# Patient Record
Sex: FEMALE | Race: WHITE | NOT HISPANIC OR LATINO | URBAN - METROPOLITAN AREA
[De-identification: names, ages, dates, MRNs, and addresses within clinical notes are randomized per-mention and may not be internally consistent; named-entity substitution may affect disease eponyms.]

---

## 2017-12-02 ENCOUNTER — EMERGENCY (EMERGENCY)
Facility: HOSPITAL | Age: 57
LOS: 1 days | Discharge: HOME | End: 2017-12-02
Attending: EMERGENCY MEDICINE | Admitting: EMERGENCY MEDICINE
Payer: COMMERCIAL

## 2017-12-02 VITALS
TEMPERATURE: 99 F | WEIGHT: 115.08 LBS | HEIGHT: 64 IN | OXYGEN SATURATION: 96 % | RESPIRATION RATE: 18 BRPM | DIASTOLIC BLOOD PRESSURE: 81 MMHG | SYSTOLIC BLOOD PRESSURE: 164 MMHG | HEART RATE: 125 BPM

## 2017-12-02 DIAGNOSIS — Z79.2 LONG TERM (CURRENT) USE OF ANTIBIOTICS: ICD-10-CM

## 2017-12-02 DIAGNOSIS — R50.9 FEVER, UNSPECIFIED: ICD-10-CM

## 2017-12-02 DIAGNOSIS — A69.20 LYME DISEASE, UNSPECIFIED: ICD-10-CM

## 2017-12-02 DIAGNOSIS — Z79.899 OTHER LONG TERM (CURRENT) DRUG THERAPY: ICD-10-CM

## 2017-12-02 PROCEDURE — 99284 EMERGENCY DEPT VISIT MOD MDM: CPT

## 2017-12-02 RX ORDER — INDOMETHACIN 50 MG
25 CAPSULE ORAL ONCE
Qty: 0 | Refills: 0 | Status: COMPLETED | OUTPATIENT
Start: 2017-12-02 | End: 2017-12-02

## 2017-12-02 RX ORDER — INDOMETHACIN 50 MG
1 CAPSULE ORAL
Qty: 21 | Refills: 0
Start: 2017-12-02 | End: 2017-12-09

## 2017-12-02 RX ORDER — CEFTRIAXONE 500 MG/1
1000 INJECTION, POWDER, FOR SOLUTION INTRAMUSCULAR; INTRAVENOUS ONCE
Qty: 0 | Refills: 0 | Status: COMPLETED | OUTPATIENT
Start: 2017-12-02 | End: 2017-12-02

## 2017-12-02 RX ADMIN — Medication 25 MILLIGRAM(S): at 14:39

## 2017-12-02 RX ADMIN — CEFTRIAXONE 1000 MILLIGRAM(S): 500 INJECTION, POWDER, FOR SOLUTION INTRAMUSCULAR; INTRAVENOUS at 14:39

## 2017-12-02 NOTE — ED PROVIDER NOTE - OBJECTIVE STATEMENT
56 y/o F [place] and was bit by a tick to L upper back. She noticed a rash to the area and was seen at [facility] urgent care, had lyme titers sent, the rash was outlined, and discharged with 21 days of Doxycycline 100 mg BID. She was advised to come to the ER if she develops fever or has increase redness. Today patient has noticed increase redness over the border and fever of 101 today, so patient comes in for evaluation. Unable to wear a bra due to discomfort. No red streaking. No itching. No increase warmth over the area. 58 y/o F was in the country in [facility] 1 week ago and was bit by a tick to L upper back. She noticed a rash to the area and was seen at [facility] urgent care, had lyme titers sent, the rash was outlined, and discharged with 21 days of Doxycycline 100 mg BID. She was advised to come to the ER if she develops fever or has increase redness. Today patient has noticed increase redness over the border and fever of 101 today, so patient comes in for evaluation. Unable to wear a bra due to discomfort. No red streaking. No itching. No increase warmth over the area.

## 2017-12-02 NOTE — ED ADULT TRIAGE NOTE - CHIEF COMPLAINT QUOTE
Pt states she was bit by a tick 1 week ago, developed cellulitis and was prescribed doxycycline, was told to come to ED if she developed fevers or redness spread outside marked area, pt states temp at home 101.8

## 2017-12-02 NOTE — ED PROVIDER NOTE - PROGRESS NOTE DETAILS
Patient showed me description bottle of 3 weeks of doxycycline, I have elected to extend that to total 28 day course. Will give single dose of Rocephin here.

## 2017-12-02 NOTE — ED PROVIDER NOTE - CARE PLAN
Principal Discharge DX:	Erythema chronicum migrans with largest skin lesion 5 cm or more  Secondary Diagnosis:	Lyme disease

## 2018-11-04 NOTE — ED ADULT NURSE NOTE - MUSCULOSKELETAL WDL
Aliya PGY2:  vision 20/20 b/l, slit lamp w/ fluorecin uptake in droplet pattern 10 o clock position to L eye, Leonardo negative.
Full range of motion of upper and lower extremities, no joint tenderness/swelling.

## 2019-02-26 ENCOUNTER — LABORATORY RESULT (OUTPATIENT)
Age: 59
End: 2019-02-26

## 2019-02-26 ENCOUNTER — APPOINTMENT (OUTPATIENT)
Dept: INTERNAL MEDICINE | Facility: CLINIC | Age: 59
End: 2019-02-26
Payer: MEDICAID

## 2019-02-26 VITALS
HEIGHT: 64 IN | TEMPERATURE: 99.4 F | HEART RATE: 135 BPM | OXYGEN SATURATION: 100 % | BODY MASS INDEX: 20.32 KG/M2 | SYSTOLIC BLOOD PRESSURE: 102 MMHG | DIASTOLIC BLOOD PRESSURE: 68 MMHG | WEIGHT: 119 LBS

## 2019-02-26 DIAGNOSIS — Z86.19 PERSONAL HISTORY OF OTHER INFECTIOUS AND PARASITIC DISEASES: ICD-10-CM

## 2019-02-26 DIAGNOSIS — Z91.89 OTHER SPECIFIED PERSONAL RISK FACTORS, NOT ELSEWHERE CLASSIFIED: ICD-10-CM

## 2019-02-26 DIAGNOSIS — Z87.19 PERSONAL HISTORY OF OTHER DISEASES OF THE DIGESTIVE SYSTEM: ICD-10-CM

## 2019-02-26 DIAGNOSIS — J45.20 MILD INTERMITTENT ASTHMA, UNCOMPLICATED: ICD-10-CM

## 2019-02-26 DIAGNOSIS — R30.0 DYSURIA: ICD-10-CM

## 2019-02-26 DIAGNOSIS — Z23 ENCOUNTER FOR IMMUNIZATION: ICD-10-CM

## 2019-02-26 DIAGNOSIS — Z87.42 PERSONAL HISTORY OF OTHER DISEASES OF THE FEMALE GENITAL TRACT: ICD-10-CM

## 2019-02-26 DIAGNOSIS — Z02.82 ENCOUNTER FOR ADOPTION SERVICES: ICD-10-CM

## 2019-02-26 DIAGNOSIS — Z13.220 ENCOUNTER FOR SCREENING FOR LIPOID DISORDERS: ICD-10-CM

## 2019-02-26 DIAGNOSIS — Z13.1 ENCOUNTER FOR SCREENING FOR DIABETES MELLITUS: ICD-10-CM

## 2019-02-26 DIAGNOSIS — R50.9 FEVER, UNSPECIFIED: ICD-10-CM

## 2019-02-26 PROCEDURE — 36415 COLL VENOUS BLD VENIPUNCTURE: CPT

## 2019-02-26 PROCEDURE — 99204 OFFICE O/P NEW MOD 45 MIN: CPT | Mod: 25

## 2019-02-27 ENCOUNTER — EMERGENCY (EMERGENCY)
Facility: HOSPITAL | Age: 59
LOS: 1 days | Discharge: HOME | End: 2019-02-27
Admitting: EMERGENCY MEDICINE
Payer: MEDICAID

## 2019-02-27 VITALS
SYSTOLIC BLOOD PRESSURE: 106 MMHG | TEMPERATURE: 98 F | DIASTOLIC BLOOD PRESSURE: 67 MMHG | RESPIRATION RATE: 17 BRPM | HEART RATE: 84 BPM | OXYGEN SATURATION: 99 %

## 2019-02-27 VITALS
RESPIRATION RATE: 19 BRPM | DIASTOLIC BLOOD PRESSURE: 73 MMHG | HEART RATE: 104 BPM | SYSTOLIC BLOOD PRESSURE: 104 MMHG | TEMPERATURE: 98 F | OXYGEN SATURATION: 98 %

## 2019-02-27 DIAGNOSIS — L50.0 ALLERGIC URTICARIA: ICD-10-CM

## 2019-02-27 DIAGNOSIS — R50.9 FEVER, UNSPECIFIED: ICD-10-CM

## 2019-02-27 DIAGNOSIS — M79.89 OTHER SPECIFIED SOFT TISSUE DISORDERS: ICD-10-CM

## 2019-02-27 DIAGNOSIS — R06.00 DYSPNEA, UNSPECIFIED: ICD-10-CM

## 2019-02-27 DIAGNOSIS — R21 RASH AND OTHER NONSPECIFIC SKIN ERUPTION: ICD-10-CM

## 2019-02-27 DIAGNOSIS — Z79.899 OTHER LONG TERM (CURRENT) DRUG THERAPY: ICD-10-CM

## 2019-02-27 DIAGNOSIS — Z79.2 LONG TERM (CURRENT) USE OF ANTIBIOTICS: ICD-10-CM

## 2019-02-27 DIAGNOSIS — R11.2 NAUSEA WITH VOMITING, UNSPECIFIED: ICD-10-CM

## 2019-02-27 DIAGNOSIS — T78.40XA ALLERGY, UNSPECIFIED, INITIAL ENCOUNTER: ICD-10-CM

## 2019-02-27 DIAGNOSIS — T36.8X5A ADVERSE EFFECT OF OTHER SYSTEMIC ANTIBIOTICS, INITIAL ENCOUNTER: ICD-10-CM

## 2019-02-27 DIAGNOSIS — L29.9 PRURITUS, UNSPECIFIED: ICD-10-CM

## 2019-02-27 LAB
ALBUMIN SERPL ELPH-MCNC: 3.3 G/DL — LOW (ref 3.4–5)
ALBUMIN SERPL ELPH-MCNC: 4.5 G/DL
ALP BLD-CCNC: 184 U/L
ALP SERPL-CCNC: 141 U/L — HIGH (ref 40–120)
ALT FLD-CCNC: 75 U/L — HIGH (ref 12–42)
ALT SERPL-CCNC: 73 U/L
ANION GAP SERPL CALC-SCNC: 14 MMOL/L — SIGNIFICANT CHANGE UP (ref 9–16)
ANION GAP SERPL CALC-SCNC: 22 MMOL/L
APPEARANCE UR: ABNORMAL
AST SERPL-CCNC: 126 U/L
AST SERPL-CCNC: 133 U/L — HIGH (ref 15–37)
B BURGDOR IGG+IGM SER QL IB: NORMAL
BASOPHILS # BLD AUTO: 0 K/UL
BASOPHILS NFR BLD AUTO: 0 %
BASOPHILS NFR BLD AUTO: 0.2 % — SIGNIFICANT CHANGE UP (ref 0–2)
BILIRUB SERPL-MCNC: 1.6 MG/DL — HIGH (ref 0.2–1.2)
BILIRUB SERPL-MCNC: 2.4 MG/DL
BILIRUB UR-MCNC: ABNORMAL
BUN SERPL-MCNC: 11 MG/DL
BUN SERPL-MCNC: 15 MG/DL — SIGNIFICANT CHANGE UP (ref 7–23)
CALCIUM SERPL-MCNC: 10 MG/DL
CALCIUM SERPL-MCNC: 8.7 MG/DL — SIGNIFICANT CHANGE UP (ref 8.5–10.5)
CHLORIDE SERPL-SCNC: 95 MMOL/L
CHLORIDE SERPL-SCNC: 97 MMOL/L — SIGNIFICANT CHANGE UP (ref 96–108)
CHOLEST SERPL-MCNC: 290 MG/DL
CHOLEST/HDLC SERPL: 3 RATIO
CO2 SERPL-SCNC: 19 MMOL/L — LOW (ref 22–31)
CO2 SERPL-SCNC: 21 MMOL/L
COLOR SPEC: ABNORMAL
CREAT SERPL-MCNC: 0.64 MG/DL
CREAT SERPL-MCNC: 0.82 MG/DL — SIGNIFICANT CHANGE UP (ref 0.5–1.3)
CRP SERPL-MCNC: 11.3 MG/DL
DIFF PNL FLD: ABNORMAL
EOSINOPHIL # BLD AUTO: 0 K/UL
EOSINOPHIL NFR BLD AUTO: 0 %
EOSINOPHIL NFR BLD AUTO: 0 % — SIGNIFICANT CHANGE UP (ref 0–6)
GLUCOSE SERPL-MCNC: 103 MG/DL — HIGH (ref 70–99)
GLUCOSE SERPL-MCNC: 129 MG/DL
GLUCOSE UR QL: 100
HBA1C MFR BLD HPLC: 4.7 %
HCT VFR BLD CALC: 36.9 % — SIGNIFICANT CHANGE UP (ref 34.5–45)
HCT VFR BLD CALC: 41.7 %
HCV AB SER QL: NONREACTIVE
HCV S/CO RATIO: 0.11 S/CO
HDLC SERPL-MCNC: 98 MG/DL
HGB BLD-MCNC: 12.6 G/DL — SIGNIFICANT CHANGE UP (ref 11.5–15.5)
HGB BLD-MCNC: 13.9 G/DL
HIV1+2 AB SPEC QL IA.RAPID: NONREACTIVE
IMM GRANULOCYTES NFR BLD AUTO: 0.5 % — SIGNIFICANT CHANGE UP (ref 0–1.5)
KETONES UR-MCNC: 15 MG/DL
LACTATE SERPL-SCNC: 3.6 MMOL/L — HIGH (ref 0.4–2)
LDLC SERPL CALC-MCNC: 158 MG/DL
LEUKOCYTE ESTERASE UR-ACNC: ABNORMAL
LYMPHOCYTES # BLD AUTO: 0.89 K/UL
LYMPHOCYTES # BLD AUTO: 7.1 % — LOW (ref 13–44)
LYMPHOCYTES NFR BLD AUTO: 5.4 %
MAN DIFF?: NORMAL
MCHC RBC-ENTMCNC: 33.3 GM/DL
MCHC RBC-ENTMCNC: 34.1 G/DL — SIGNIFICANT CHANGE UP (ref 32–36)
MCHC RBC-ENTMCNC: 38.8 PG — HIGH (ref 27–34)
MCHC RBC-ENTMCNC: 39.3 PG
MCV RBC AUTO: 113.5 FL — HIGH (ref 80–100)
MCV RBC AUTO: 117.8 FL
MONOCYTES # BLD AUTO: 1.78 K/UL
MONOCYTES NFR BLD AUTO: 10.8 %
MONOCYTES NFR BLD AUTO: 15.5 % — HIGH (ref 2–14)
NEUTROPHILS # BLD AUTO: 13.67 K/UL
NEUTROPHILS NFR BLD AUTO: 70.3 %
NEUTROPHILS NFR BLD AUTO: 76.7 % — SIGNIFICANT CHANGE UP (ref 43–77)
NITRITE UR-MCNC: NEGATIVE — SIGNIFICANT CHANGE UP
PH UR: 6 — SIGNIFICANT CHANGE UP (ref 5–8)
PLATELET # BLD AUTO: 129 K/UL — LOW (ref 150–400)
PLATELET # BLD AUTO: 144 K/UL
POTASSIUM SERPL-MCNC: 4.7 MMOL/L — SIGNIFICANT CHANGE UP (ref 3.5–5.3)
POTASSIUM SERPL-SCNC: 4.7 MMOL/L
POTASSIUM SERPL-SCNC: 4.7 MMOL/L — SIGNIFICANT CHANGE UP (ref 3.5–5.3)
PROT SERPL-MCNC: 7.3 G/DL — SIGNIFICANT CHANGE UP (ref 6.4–8.2)
PROT SERPL-MCNC: 7.7 G/DL
PROT UR-MCNC: 100 MG/DL
RBC # BLD: 3.25 M/UL — LOW (ref 3.8–5.2)
RBC # BLD: 3.54 M/UL
RBC # FLD: 13.5 % — SIGNIFICANT CHANGE UP (ref 10.3–14.5)
RBC # FLD: 13.9 %
RHEUMATOID FACT SER QL: 14 IU/ML
SODIUM SERPL-SCNC: 130 MMOL/L — LOW (ref 132–145)
SODIUM SERPL-SCNC: 138 MMOL/L
SP GR SPEC: >=1.03 — SIGNIFICANT CHANGE UP (ref 1–1.03)
TRIGL SERPL-MCNC: 170 MG/DL
UROBILINOGEN FLD QL: 4 E.U./DL
WBC # BLD: 13.1 K/UL — HIGH (ref 3.8–10.5)
WBC # FLD AUTO: 13.1 K/UL — HIGH (ref 3.8–10.5)
WBC # FLD AUTO: 16.49 K/UL

## 2019-02-27 PROCEDURE — 71046 X-RAY EXAM CHEST 2 VIEWS: CPT | Mod: 26

## 2019-02-27 PROCEDURE — 99284 EMERGENCY DEPT VISIT MOD MDM: CPT | Mod: 25

## 2019-02-27 RX ORDER — FAMOTIDINE 10 MG/ML
1 INJECTION INTRAVENOUS
Qty: 14 | Refills: 0
Start: 2019-02-27 | End: 2019-03-05

## 2019-02-27 RX ORDER — SODIUM CHLORIDE 9 MG/ML
1000 INJECTION INTRAMUSCULAR; INTRAVENOUS; SUBCUTANEOUS ONCE
Qty: 0 | Refills: 0 | Status: COMPLETED | OUTPATIENT
Start: 2019-02-27 | End: 2019-02-27

## 2019-02-27 RX ORDER — DIPHENHYDRAMINE HCL 50 MG
50 CAPSULE ORAL ONCE
Qty: 0 | Refills: 0 | Status: COMPLETED | OUTPATIENT
Start: 2019-02-27 | End: 2019-02-27

## 2019-02-27 RX ORDER — DIPHENHYDRAMINE HCL 50 MG
1 CAPSULE ORAL
Qty: 30 | Refills: 0
Start: 2019-02-27 | End: 2019-03-08

## 2019-02-27 RX ORDER — CEFTRIAXONE 500 MG/1
1 INJECTION, POWDER, FOR SOLUTION INTRAMUSCULAR; INTRAVENOUS EVERY 24 HOURS
Qty: 0 | Refills: 0 | Status: DISCONTINUED | OUTPATIENT
Start: 2019-02-27 | End: 2019-03-03

## 2019-02-27 RX ORDER — FAMOTIDINE 10 MG/ML
20 INJECTION INTRAVENOUS DAILY
Qty: 0 | Refills: 0 | Status: DISCONTINUED | OUTPATIENT
Start: 2019-02-27 | End: 2019-03-03

## 2019-02-27 RX ORDER — EPINEPHRINE 0.3 MG/.3ML
0.3 INJECTION INTRAMUSCULAR; SUBCUTANEOUS
Qty: 0.3 | Refills: 0
Start: 2019-02-27 | End: 2019-02-27

## 2019-02-27 RX ORDER — DIPHENHYDRAMINE HCL 50 MG
1 CAPSULE ORAL
Qty: 21 | Refills: 0
Start: 2019-02-27 | End: 2019-03-05

## 2019-02-27 RX ORDER — CEFUROXIME AXETIL 250 MG
1 TABLET ORAL
Qty: 20 | Refills: 0
Start: 2019-02-27 | End: 2019-03-08

## 2019-02-27 RX ADMIN — CEFTRIAXONE 100 GRAM(S): 500 INJECTION, POWDER, FOR SOLUTION INTRAMUSCULAR; INTRAVENOUS at 14:26

## 2019-02-27 RX ADMIN — SODIUM CHLORIDE 1000 MILLILITER(S): 9 INJECTION INTRAMUSCULAR; INTRAVENOUS; SUBCUTANEOUS at 15:00

## 2019-02-27 RX ADMIN — CEFTRIAXONE 1 GRAM(S): 500 INJECTION, POWDER, FOR SOLUTION INTRAMUSCULAR; INTRAVENOUS at 15:11

## 2019-02-27 RX ADMIN — Medication 50 MILLIGRAM(S): at 13:58

## 2019-02-27 RX ADMIN — Medication 125 MILLIGRAM(S): at 13:58

## 2019-02-27 RX ADMIN — FAMOTIDINE 100 MILLIGRAM(S): 10 INJECTION INTRAVENOUS at 13:58

## 2019-02-27 NOTE — ED ADULT NURSE NOTE - NSIMPLEMENTINTERV_GEN_ALL_ED
Implemented All Universal Safety Interventions:  [place] to call system. Call bell, personal items and telephone within reach. Instruct patient to call for assistance. Room bathroom lighting operational. Non-slip footwear when patient is off stretcher. Physically safe environment: no spills, clutter or unnecessary equipment. Stretcher in lowest position, wheels locked, appropriate side rails in place.

## 2019-02-27 NOTE — ED PROVIDER NOTE - CLINICAL SUMMARY MEDICAL DECISION MAKING FREE TEXT BOX
Pt. currently being tx. for UTI started on cipro X 2 days, notice sudden onset of pleuritic thia am. otherwise no respiratory distress. vss, pred, benadryl,  pepcid, no clinical need for epi.  given recently uti sx and fever sepsis work up initiated. wbc 13 , 16 yesterday, UA + chest neg , pt. evaluated for 5 hrs rash improved, no other acute infectious process, pt. feeling better, requesting d/c, will f/u with pmd. advised to stop cipro.

## 2019-02-27 NOTE — ED PROVIDER NOTE - ENMT, MLM
Airway patent. Mouth with normal mucosa. Throat has no vesicles, no oropharyngeal exudates. Tongue is not swollen, uvula is midline and normal.

## 2019-02-27 NOTE — ED PROVIDER NOTE - OBJECTIVE STATEMENT
58 y/o female with PMHx of Lyme Disease (2x in the past year, treated with Doxycyline both times) presents to ED c/o allergic reaction. Patient reports she had a fever of 102F on Sunday that got up to 104F yesterday, went to her PMD who diagnosed her with UTI and Rxed Cipro, and had tests done (results not back yet). States she is currently on day 2 of Cipro (took 1 dose yesterday 10PM and 1 today at 10AM). Reports rash on the knees yesterday after taking the first dose, and reports generalized itchy pleuritic urticaria today after taking the second dose. Also reports difficulty breathing and bilateral hand swelling, became anxious and took the cab to the ED. Patient reports she took 2 Aleve today, reports temperature of 103.8F in the morning, temperature in the ED is 97.7F. Denies any cough, runny nose, abd pain, diarrhea, CP, respiratory distress, HA, and urinary symptoms. Patient notes an episode of emesis while riding a car, which she attributes to car sickness, no other episodes of emesis. States she had an Epipen before for wasp/bee stings, but has never used Epipen in the past, no previous ED visits for allergic reactions.

## 2019-02-27 NOTE — ED PROVIDER NOTE - RESPIRATORY, MLM
Breath sounds clear and equal bilaterally. Patient is not in respiratory distress, is talking in full sentences.

## 2019-02-27 NOTE — ED ADULT TRIAGE NOTE - CHIEF COMPLAINT QUOTE
pt took cipro last night and was itchy throughout night took second dose this morning not realizing it might be related. now with generalized hives.

## 2019-02-27 NOTE — ED PROVIDER NOTE - DIAGNOSTIC INTERPRETATION
Interpreted by aman YBARRA chest x-ray, 2 views  Lungs clear, heart shadow normal, bony structures normal, no free air under diaphragm, no PTX

## 2019-02-27 NOTE — ED ADULT NURSE NOTE - OBJECTIVE STATEMENT
scattered hives and itching x 2-3 hrs, pt states she started cipro (1st dose last night at 2200) for uti, no resp distress noted, placed on cont. pulse ox, will continue to monitor

## 2019-02-28 ENCOUNTER — EMERGENCY (EMERGENCY)
Facility: HOSPITAL | Age: 59
LOS: 1 days | Discharge: HOME | End: 2019-02-28
Admitting: EMERGENCY MEDICINE
Payer: MEDICAID

## 2019-02-28 VITALS
TEMPERATURE: 98 F | SYSTOLIC BLOOD PRESSURE: 115 MMHG | DIASTOLIC BLOOD PRESSURE: 73 MMHG | OXYGEN SATURATION: 99 % | RESPIRATION RATE: 17 BRPM | HEART RATE: 99 BPM

## 2019-02-28 VITALS
OXYGEN SATURATION: 100 % | DIASTOLIC BLOOD PRESSURE: 78 MMHG | RESPIRATION RATE: 18 BRPM | TEMPERATURE: 98 F | HEART RATE: 850 BPM | SYSTOLIC BLOOD PRESSURE: 99 MMHG

## 2019-02-28 LAB
ALBUMIN SERPL ELPH-MCNC: 3.4 G/DL — SIGNIFICANT CHANGE UP (ref 3.4–5)
ALP SERPL-CCNC: 133 U/L — HIGH (ref 40–120)
ALT FLD-CCNC: 84 U/L — HIGH (ref 12–42)
ANA SER IF-ACNC: NEGATIVE
ANION GAP SERPL CALC-SCNC: 17 MMOL/L — HIGH (ref 9–16)
AST SERPL-CCNC: 108 U/L — HIGH (ref 15–37)
BASOPHILS NFR BLD AUTO: 0 % — SIGNIFICANT CHANGE UP (ref 0–2)
BILIRUB SERPL-MCNC: 0.8 MG/DL — SIGNIFICANT CHANGE UP (ref 0.2–1.2)
BUN SERPL-MCNC: 20 MG/DL — SIGNIFICANT CHANGE UP (ref 7–23)
CALCIUM SERPL-MCNC: 9 MG/DL — SIGNIFICANT CHANGE UP (ref 8.5–10.5)
CHLORIDE SERPL-SCNC: 102 MMOL/L — SIGNIFICANT CHANGE UP (ref 96–108)
CO2 SERPL-SCNC: 20 MMOL/L — LOW (ref 22–31)
CREAT SERPL-MCNC: 0.69 MG/DL — SIGNIFICANT CHANGE UP (ref 0.5–1.3)
EOSINOPHIL NFR BLD AUTO: 0 % — SIGNIFICANT CHANGE UP (ref 0–6)
ERYTHROCYTE [SEDIMENTATION RATE] IN BLOOD BY WESTERGREN METHOD: 37 MM/HR
GLUCOSE SERPL-MCNC: 140 MG/DL — HIGH (ref 70–99)
HCT VFR BLD CALC: 32.1 % — LOW (ref 34.5–45)
HETEROPH AB SER QL: NEGATIVE
HGB BLD-MCNC: 11.1 G/DL — LOW (ref 11.5–15.5)
IMM GRANULOCYTES NFR BLD AUTO: 0.6 % — SIGNIFICANT CHANGE UP (ref 0–1.5)
LACTATE SERPL-SCNC: 2.9 MMOL/L — HIGH (ref 0.4–2)
LYMPHOCYTES # BLD AUTO: 9.7 % — LOW (ref 13–44)
MCHC RBC-ENTMCNC: 34.6 G/DL — SIGNIFICANT CHANGE UP (ref 32–36)
MCHC RBC-ENTMCNC: 38.3 PG — HIGH (ref 27–34)
MCV RBC AUTO: 110.7 FL — HIGH (ref 80–100)
MONOCYTES NFR BLD AUTO: 7 % — SIGNIFICANT CHANGE UP (ref 2–14)
NEUTROPHILS NFR BLD AUTO: 82.7 % — HIGH (ref 43–77)
PLATELET # BLD AUTO: 132 K/UL — LOW (ref 150–400)
POTASSIUM SERPL-MCNC: 3.2 MMOL/L — LOW (ref 3.5–5.3)
POTASSIUM SERPL-SCNC: 3.2 MMOL/L — LOW (ref 3.5–5.3)
PROT SERPL-MCNC: 7 G/DL — SIGNIFICANT CHANGE UP (ref 6.4–8.2)
RBC # BLD: 2.9 M/UL — LOW (ref 3.8–5.2)
RBC # FLD: 13.2 % — SIGNIFICANT CHANGE UP (ref 10.3–14.5)
SODIUM SERPL-SCNC: 139 MMOL/L — SIGNIFICANT CHANGE UP (ref 132–145)
WBC # BLD: 5.3 K/UL — SIGNIFICANT CHANGE UP (ref 3.8–10.5)
WBC # FLD AUTO: 5.3 K/UL — SIGNIFICANT CHANGE UP (ref 3.8–10.5)

## 2019-02-28 PROCEDURE — 99284 EMERGENCY DEPT VISIT MOD MDM: CPT | Mod: 25

## 2019-02-28 RX ORDER — POTASSIUM CHLORIDE 20 MEQ
40 PACKET (EA) ORAL ONCE
Qty: 0 | Refills: 0 | Status: COMPLETED | OUTPATIENT
Start: 2019-02-28 | End: 2019-02-28

## 2019-02-28 RX ORDER — FAMOTIDINE 10 MG/ML
20 INJECTION INTRAVENOUS ONCE
Qty: 0 | Refills: 0 | Status: COMPLETED | OUTPATIENT
Start: 2019-02-28 | End: 2019-02-28

## 2019-02-28 RX ORDER — SODIUM CHLORIDE 9 MG/ML
1000 INJECTION INTRAMUSCULAR; INTRAVENOUS; SUBCUTANEOUS ONCE
Qty: 0 | Refills: 0 | Status: COMPLETED | OUTPATIENT
Start: 2019-02-28 | End: 2019-02-28

## 2019-02-28 RX ORDER — CEFUROXIME AXETIL 250 MG
250 TABLET ORAL ONCE
Qty: 0 | Refills: 0 | Status: COMPLETED | OUTPATIENT
Start: 2019-02-28 | End: 2019-02-28

## 2019-02-28 RX ORDER — DIPHENHYDRAMINE HCL 50 MG
50 CAPSULE ORAL ONCE
Qty: 0 | Refills: 0 | Status: COMPLETED | OUTPATIENT
Start: 2019-02-28 | End: 2019-02-28

## 2019-02-28 RX ADMIN — Medication 40 MILLIEQUIVALENT(S): at 07:15

## 2019-02-28 RX ADMIN — SODIUM CHLORIDE 1000 MILLILITER(S): 9 INJECTION INTRAMUSCULAR; INTRAVENOUS; SUBCUTANEOUS at 05:56

## 2019-02-28 RX ADMIN — Medication 250 MILLIGRAM(S): at 07:15

## 2019-02-28 RX ADMIN — Medication 125 MILLIGRAM(S): at 05:57

## 2019-02-28 RX ADMIN — Medication 50 MILLIGRAM(S): at 06:02

## 2019-02-28 RX ADMIN — FAMOTIDINE 20 MILLIGRAM(S): 10 INJECTION INTRAVENOUS at 06:02

## 2019-02-28 NOTE — ED PROVIDER NOTE - NSFOLLOWUPINSTRUCTIONS_ED_ALL_ED_FT
Continue medication as prescribed. Ceftin for UTI. Prednisone for hives. Follow up with your primary care doctor for 24-48 hours. Return for any worsening or changing symptoms

## 2019-02-28 NOTE — ED ADULT NURSE NOTE - OBJECTIVE STATEMENT
Pt is a 59y female complaining of allergic reaction. Pt states that she was seen here yesterday for a similar episode after taking two doses of cipro for UTI. Pt returned today extremely agitated yelling at staff refusing to fully answer questions "you should know why I am here didn't you read my chart. You people didn't send my prescription to a pharmacy that accepted my insurance."  Pt has small area of redness noted to right arm. PT denies sob, chest pain, nausea, vomiting, fever, and chills.

## 2019-02-28 NOTE — ED PROVIDER NOTE - OBJECTIVE STATEMENT
59 year old female with h/o lyme disease (treated) presents with presumed allergic reaction to cipro. patient refusing to fully answer questions, continues to state, "Did you read my chart, did you read my chart?" she refuses to re-explain what happened. continues to yell "this is a place where I am suppose to get help, not have to explain myself"  states it is our fault her allergic reaction returned, because she was unable to  medication because we sent it to the wrong pharmacy that her insurance does not cover medication there.   Denies fever, chills, palpitations, diaphoresis, TORRES, SOB, orthopnea, cough, hemoptysis, wheezing, peripheral edema, focal weakness, numbness, tingling, paresthesia, HA, dizziness, neck pain, N/V/D/C, abdominal pain, change in urinary/bowel function, trauma, fall, and malaise. 59 year old female with h/o lyme disease (treated), alcohol abuse (1 bottle of wine a day) presents with presumed allergic reaction to cipro. patient refusing to fully answer questions, continues to state, "Did you read my chart, did you read my chart?" she refuses to re-explain what happened. continues to yell "this is a place where I am suppose to get help, not have to explain myself"  states it is our fault her allergic reaction returned, because she was unable to  medication because we sent it to the wrong pharmacy that her insurance does not cover medication there.   Denies fever, chills, palpitations, diaphoresis, TORRES, SOB, orthopnea, cough, hemoptysis, wheezing, peripheral edema, focal weakness, numbness, tingling, paresthesia, HA, dizziness, neck pain, N/V/D/C, abdominal pain, change in urinary/bowel function, trauma, fall, and malaise.

## 2019-02-28 NOTE — ED ADULT TRIAGE NOTE - CHIEF COMPLAINT QUOTE
Pt walked into ED c.o allergic reaction to cipro. Pt states "I was seen here in this ED today and was discharged but the reaction is coming back". Pt agitated at this time with no sign of difficulty breathing, Pt has noted redness to arms at this time,

## 2019-02-28 NOTE — ED PROVIDER NOTE - CLINICAL SUMMARY MEDICAL DECISION MAKING FREE TEXT BOX
allergic reaction, 2nd visit to ED for similar symptoms. urticaria to b/l forearms and shin from presumed cipro that she was taking for UTI. will give cocktail repeat labs.    An allergic reaction is an abnormal reaction to a substance (allergen) by the body's defense system. Common allergens include medicines, food, insect bites or stings, and blood products. The body releases certain proteins into the blood that can cause a variety of symptoms such as an itchy rash, wheezing, swelling of the face/lips/tongue/throat, abdominal pain, nausea or vomiting. An allergic reaction is usually treated with medication. If your health care provider prescribed you an epinephrine injection device, make sure to keep it with you at all times.    SEEK IMMEDIATE MEDICAL CARE IF YOU HAVE ANY OF THE FOLLOWING SYMPTOMS: allergic reaction severe enough that required you to use epinephrine, tightness in your chest, swelling around your lips/tongue/throat, abdominal pain, vomiting or diarrhea, or lightheadedness/dizziness. These symptoms may represent a serious problem that is an emergency. Do not wait to see if the symptoms will go away. Use your auto-injector pen or anaphylaxis kit as you have been instructed. Call 911 and do not drive yourself to the hospital.

## 2019-02-28 NOTE — ED ADULT NURSE REASSESSMENT NOTE - NS ED NURSE REASSESS COMMENT FT1
patient walking around [address] screaming at staff that she would like to be seen. patient has no signs of acute distress, speaking complete clear full sentences. patient c/o rash to right arm at this time. Patient made aware provider will come see her in which she started cursing at staff.

## 2019-02-28 NOTE — ED PROVIDER NOTE - NS ED ROS FT
· CONSTITUTIONAL: no fever and no chills.  · CARDIOVASCULAR: normal rate and rhythm, no chest pain and no edema.  · RESPIRATORY: no chest pain, no cough, and no shortness of breath.  · GASTROINTESTINAL: no abdominal pain, no bloating, no constipation, no diarrhea, no nausea and no vomiting.  · MUSCULOSKELETAL: no back pain, no musculoskeletal pain, no neck pain, and no weakness.  · SKIN: no abrasions, no jaundice, no lesions, no pruritis, and + rashes.  · NEURO: no loss of consciousness, no gait abnormality, no headache, no sensory deficits, and no weakness.  · PSYCHIATRIC: no known mental health issues.

## 2019-02-28 NOTE — ED PROVIDER NOTE - PHYSICAL EXAMINATION
VITAL SIGNS: I have reviewed nursing notes and confirm.  CONSTITUTIONAL: Well-developed; well-nourished; in no acute distress.  SKIN:  + urticaria to bilateral forearms and bilateral shins, excoriation marks presents, no sign of infection. OTHER: Skin is warm and dry  HEAD: Normocephalic; atraumatic.  EYES: PERRL, EOM intact; conjunctiva and sclera clear.  ENT: No nasal discharge; airway clear.  NECK: Supple; non tender.  CARD: S1, S2 normal; no murmurs, gallops, or rubs. Regular rate and rhythm.  RESP: No wheezes, rales or rhonchi.  ABD: Normal bowel sounds; soft; non-distended; non-tender;  no palpable or pulsating mass; no hepatosplenomegaly.  EXT: Normal ROM. No clubbing, cyanosis or edema.  NEURO: Alert, oriented. Grossly unremarkable.  PSYCH: Cooperative, appropriate.

## 2019-02-28 NOTE — ED ADULT NURSE NOTE - CHPI ED NUR SYMPTOMS NEG
no congestion/no swelling of face, tongue/no throat itching/no difficulty breathing/no wheezing/no difficulty swallowing/no shortness of breath

## 2019-02-28 NOTE — ED ADULT NURSE NOTE - NSIMPLEMENTINTERV_GEN_ALL_ED
Implemented All Universal Safety Interventions:  [place] to call system. Call bell, personal items and telephone within reach. Instruct patient to call for assistance. Room bathroom lighting operational. Non-slip footwear when patient is off stretcher. Physically safe environment: no spills, clutter or unnecessary equipment. Stretcher in lowest position, wheels locked, appropriate side rails in place.

## 2019-02-28 NOTE — ED PROVIDER NOTE - PROGRESS NOTE DETAILS
patient yelling and threatening staff from arrival in triage. patient was in ED for 10 minutes and came out of room yelling because she had been waiting too long. abd soft, NT, ND. repeat lactate improved. leukocytosis resolved.  CXR reviewed from previous visit, no acute findings.  LFTs likely elevated from chronic lyme, reports elevated typically per patient. will give copy to patient to f/u with PMD. tolerating PO, patient reprots feeling much improved abd soft, NT, ND. repeat lactate improved. leukocytosis resolved.  CXR reviewed from previous visit, no acute findings.  LFTs likely elevated from alcohol use along with lactate.  will give copy to patient to f/u with PMD. tolerating PO, patient reprots feeling much improved abd soft, NT, ND. repeat lactate improved. leukocytosis resolved.  CXR reviewed from previous visit, no acute findings.  LFTs likely elevated from alcohol use along with lactate.  will give copy to patient to f/u with PMD. tolerating PO, patient reports feeling much improved, rash resolved.  hypokalemia noted, will give oral K

## 2019-03-01 ENCOUNTER — LABORATORY RESULT (OUTPATIENT)
Age: 59
End: 2019-03-01

## 2019-03-01 LAB
CULTURE RESULTS: NO GROWTH — SIGNIFICANT CHANGE UP
SPECIMEN SOURCE: SIGNIFICANT CHANGE UP

## 2019-03-04 DIAGNOSIS — Z79.1 LONG TERM (CURRENT) USE OF NON-STEROIDAL ANTI-INFLAMMATORIES (NSAID): ICD-10-CM

## 2019-03-04 DIAGNOSIS — T36.8X5A ADVERSE EFFECT OF OTHER SYSTEMIC ANTIBIOTICS, INITIAL ENCOUNTER: ICD-10-CM

## 2019-03-04 DIAGNOSIS — Z79.52 LONG TERM (CURRENT) USE OF SYSTEMIC STEROIDS: ICD-10-CM

## 2019-03-04 DIAGNOSIS — R21 RASH AND OTHER NONSPECIFIC SKIN ERUPTION: ICD-10-CM

## 2019-03-04 DIAGNOSIS — L50.0 ALLERGIC URTICARIA: ICD-10-CM

## 2019-03-04 DIAGNOSIS — Z88.1 ALLERGY STATUS TO OTHER ANTIBIOTIC AGENTS STATUS: ICD-10-CM

## 2019-03-04 DIAGNOSIS — Z79.899 OTHER LONG TERM (CURRENT) DRUG THERAPY: ICD-10-CM

## 2019-03-04 DIAGNOSIS — E87.6 HYPOKALEMIA: ICD-10-CM

## 2019-03-05 ENCOUNTER — APPOINTMENT (OUTPATIENT)
Dept: INTERNAL MEDICINE | Facility: CLINIC | Age: 59
End: 2019-03-05

## 2019-03-05 LAB
CULTURE RESULTS: SIGNIFICANT CHANGE UP
CULTURE RESULTS: SIGNIFICANT CHANGE UP
SPECIMEN SOURCE: SIGNIFICANT CHANGE UP
SPECIMEN SOURCE: SIGNIFICANT CHANGE UP

## 2019-03-06 ENCOUNTER — APPOINTMENT (OUTPATIENT)
Dept: INTERNAL MEDICINE | Facility: CLINIC | Age: 59
End: 2019-03-06

## 2019-03-06 PROBLEM — A69.20 LYME DISEASE, UNSPECIFIED: Chronic | Status: ACTIVE | Noted: 2019-02-27

## 2019-03-07 LAB
B BURGDOR AB SER-IMP: POSITIVE
B BURGDOR IGM PATRN SER IB-IMP: NEGATIVE
B BURGDOR18/20KD IGM SER QL IB: NORMAL
B BURGDOR18KD IGG SER QL IB: PRESENT
B BURGDOR23KD IGG SER QL IB: PRESENT
B BURGDOR23KD IGM SER QL IB: NORMAL
B BURGDOR28KD AB SER QL IB: NORMAL
B BURGDOR28KD IGG SER QL IB: NORMAL
B BURGDOR30KD AB SER QL IB: NORMAL
B BURGDOR30KD IGG SER QL IB: NORMAL
B BURGDOR31KD IGG SER QL IB: NORMAL
B BURGDOR31KD IGM SER QL IB: NORMAL
B BURGDOR39KD IGG SER QL IB: PRESENT
B BURGDOR39KD IGM SER QL IB: NORMAL
B BURGDOR41KD IGG SER QL IB: PRESENT
B BURGDOR41KD IGM SER QL IB: PRESENT
B BURGDOR45KD AB SER QL IB: NORMAL
B BURGDOR45KD IGG SER QL IB: PRESENT
B BURGDOR58KD AB SER QL IB: NORMAL
B BURGDOR58KD IGG SER QL IB: PRESENT
B BURGDOR66KD IGG SER QL IB: NORMAL
B BURGDOR66KD IGM SER QL IB: NORMAL
B BURGDOR93KD IGG SER QL IB: NORMAL
B BURGDOR93KD IGM SER QL IB: NORMAL

## 2019-08-28 NOTE — ED ADULT NURSE NOTE - NSSISCREENINGQ5_ED_A_ED
No

## 2019-09-12 ENCOUNTER — APPOINTMENT (OUTPATIENT)
Dept: INTERNAL MEDICINE | Facility: CLINIC | Age: 59
End: 2019-09-12

## 2019-12-03 ENCOUNTER — INPATIENT (INPATIENT)
Facility: HOSPITAL | Age: 59
LOS: 1 days | Discharge: HOME | DRG: 378 | End: 2019-12-05
Attending: STUDENT IN AN ORGANIZED HEALTH CARE EDUCATION/TRAINING PROGRAM | Admitting: STUDENT IN AN ORGANIZED HEALTH CARE EDUCATION/TRAINING PROGRAM
Payer: COMMERCIAL

## 2019-12-03 VITALS
OXYGEN SATURATION: 98 % | RESPIRATION RATE: 18 BRPM | DIASTOLIC BLOOD PRESSURE: 65 MMHG | WEIGHT: 108.91 LBS | TEMPERATURE: 99 F | HEIGHT: 64 IN | HEART RATE: 125 BPM | SYSTOLIC BLOOD PRESSURE: 108 MMHG

## 2019-12-03 LAB
ALBUMIN SERPL ELPH-MCNC: 3.5 G/DL — SIGNIFICANT CHANGE UP (ref 3.4–5)
ALP SERPL-CCNC: 209 U/L — HIGH (ref 40–120)
ALT FLD-CCNC: 45 U/L — HIGH (ref 12–42)
ANION GAP SERPL CALC-SCNC: 9 MMOL/L — SIGNIFICANT CHANGE UP (ref 9–16)
APTT BLD: 28.9 SEC — SIGNIFICANT CHANGE UP (ref 27.5–36.3)
AST SERPL-CCNC: 86 U/L — HIGH (ref 15–37)
BASOPHILS # BLD AUTO: 0.02 K/UL — SIGNIFICANT CHANGE UP (ref 0–0.2)
BASOPHILS NFR BLD AUTO: 0.3 % — SIGNIFICANT CHANGE UP (ref 0–2)
BILIRUB SERPL-MCNC: 2.6 MG/DL — HIGH (ref 0.2–1.2)
BUN SERPL-MCNC: 21 MG/DL — SIGNIFICANT CHANGE UP (ref 7–23)
CALCIUM SERPL-MCNC: 9.6 MG/DL — SIGNIFICANT CHANGE UP (ref 8.5–10.5)
CHLORIDE SERPL-SCNC: 99 MMOL/L — SIGNIFICANT CHANGE UP (ref 96–108)
CO2 SERPL-SCNC: 30 MMOL/L — SIGNIFICANT CHANGE UP (ref 22–31)
CREAT SERPL-MCNC: 0.78 MG/DL — SIGNIFICANT CHANGE UP (ref 0.5–1.3)
EOSINOPHIL # BLD AUTO: 0.04 K/UL — SIGNIFICANT CHANGE UP (ref 0–0.5)
EOSINOPHIL NFR BLD AUTO: 0.5 % — SIGNIFICANT CHANGE UP (ref 0–6)
GLUCOSE SERPL-MCNC: 137 MG/DL — HIGH (ref 70–99)
HCT VFR BLD CALC: 32.1 % — LOW (ref 34.5–45)
HGB BLD-MCNC: 11.1 G/DL — LOW (ref 11.5–15.5)
IMM GRANULOCYTES NFR BLD AUTO: 0.4 % — SIGNIFICANT CHANGE UP (ref 0–1.5)
INR BLD: 1.26 — HIGH (ref 0.88–1.16)
LACTATE SERPL-SCNC: 1.7 MMOL/L — SIGNIFICANT CHANGE UP (ref 0.4–2)
LACTATE SERPL-SCNC: 3 MMOL/L — HIGH (ref 0.4–2)
LIDOCAIN IGE QN: 121 U/L — SIGNIFICANT CHANGE UP (ref 73–393)
LYMPHOCYTES # BLD AUTO: 1.53 K/UL — SIGNIFICANT CHANGE UP (ref 1–3.3)
LYMPHOCYTES # BLD AUTO: 19.9 % — SIGNIFICANT CHANGE UP (ref 13–44)
MAGNESIUM SERPL-MCNC: 1.5 MG/DL — LOW (ref 1.6–2.6)
MCHC RBC-ENTMCNC: 34.6 GM/DL — SIGNIFICANT CHANGE UP (ref 32–36)
MCHC RBC-ENTMCNC: 37.4 PG — HIGH (ref 27–34)
MCV RBC AUTO: 108.1 FL — HIGH (ref 80–100)
MONOCYTES # BLD AUTO: 0.78 K/UL — SIGNIFICANT CHANGE UP (ref 0–0.9)
MONOCYTES NFR BLD AUTO: 10.2 % — SIGNIFICANT CHANGE UP (ref 2–14)
NEUTROPHILS # BLD AUTO: 5.27 K/UL — SIGNIFICANT CHANGE UP (ref 1.8–7.4)
NEUTROPHILS NFR BLD AUTO: 68.7 % — SIGNIFICANT CHANGE UP (ref 43–77)
NRBC # BLD: 0 /100 WBCS — SIGNIFICANT CHANGE UP (ref 0–0)
PLATELET # BLD AUTO: 129 K/UL — LOW (ref 150–400)
POTASSIUM SERPL-MCNC: 3.1 MMOL/L — LOW (ref 3.5–5.3)
POTASSIUM SERPL-SCNC: 3.1 MMOL/L — LOW (ref 3.5–5.3)
PROT SERPL-MCNC: 7.3 G/DL — SIGNIFICANT CHANGE UP (ref 6.4–8.2)
PROTHROM AB SERPL-ACNC: 14 SEC — HIGH (ref 10–12.9)
RBC # BLD: 2.97 M/UL — LOW (ref 3.8–5.2)
RBC # FLD: 13.3 % — SIGNIFICANT CHANGE UP (ref 10.3–14.5)
SODIUM SERPL-SCNC: 138 MMOL/L — SIGNIFICANT CHANGE UP (ref 132–145)
WBC # BLD: 7.67 K/UL — SIGNIFICANT CHANGE UP (ref 3.8–10.5)
WBC # FLD AUTO: 7.67 K/UL — SIGNIFICANT CHANGE UP (ref 3.8–10.5)

## 2019-12-03 PROCEDURE — 93010 ELECTROCARDIOGRAM REPORT: CPT

## 2019-12-03 PROCEDURE — 99291 CRITICAL CARE FIRST HOUR: CPT

## 2019-12-03 RX ORDER — SODIUM CHLORIDE 9 MG/ML
1000 INJECTION INTRAMUSCULAR; INTRAVENOUS; SUBCUTANEOUS ONCE
Refills: 0 | Status: COMPLETED | OUTPATIENT
Start: 2019-12-03 | End: 2019-12-03

## 2019-12-03 RX ORDER — ONDANSETRON 8 MG/1
4 TABLET, FILM COATED ORAL ONCE
Refills: 0 | Status: COMPLETED | OUTPATIENT
Start: 2019-12-03 | End: 2019-12-03

## 2019-12-03 RX ORDER — MAGNESIUM SULFATE 500 MG/ML
2 VIAL (ML) INJECTION ONCE
Refills: 0 | Status: COMPLETED | OUTPATIENT
Start: 2019-12-03 | End: 2019-12-03

## 2019-12-03 RX ORDER — POTASSIUM CHLORIDE 20 MEQ
10 PACKET (EA) ORAL
Refills: 0 | Status: COMPLETED | OUTPATIENT
Start: 2019-12-03 | End: 2019-12-04

## 2019-12-03 RX ORDER — PANTOPRAZOLE SODIUM 20 MG/1
80 TABLET, DELAYED RELEASE ORAL ONCE
Refills: 0 | Status: COMPLETED | OUTPATIENT
Start: 2019-12-03 | End: 2019-12-03

## 2019-12-03 RX ORDER — POTASSIUM CHLORIDE 20 MEQ
60 PACKET (EA) ORAL ONCE
Refills: 0 | Status: COMPLETED | OUTPATIENT
Start: 2019-12-03 | End: 2019-12-03

## 2019-12-03 RX ADMIN — Medication 60 MILLIEQUIVALENT(S): at 23:50

## 2019-12-03 RX ADMIN — Medication 50 GRAM(S): at 23:50

## 2019-12-03 RX ADMIN — ONDANSETRON 4 MILLIGRAM(S): 8 TABLET, FILM COATED ORAL at 21:34

## 2019-12-03 RX ADMIN — Medication 100 MILLIEQUIVALENT(S): at 22:10

## 2019-12-03 RX ADMIN — SODIUM CHLORIDE 1000 MILLILITER(S): 9 INJECTION INTRAMUSCULAR; INTRAVENOUS; SUBCUTANEOUS at 23:06

## 2019-12-03 RX ADMIN — SODIUM CHLORIDE 1000 MILLILITER(S): 9 INJECTION INTRAMUSCULAR; INTRAVENOUS; SUBCUTANEOUS at 21:34

## 2019-12-03 RX ADMIN — PANTOPRAZOLE SODIUM 80 MILLIGRAM(S): 20 TABLET, DELAYED RELEASE ORAL at 21:34

## 2019-12-03 RX ADMIN — SODIUM CHLORIDE 1000 MILLILITER(S): 9 INJECTION INTRAMUSCULAR; INTRAVENOUS; SUBCUTANEOUS at 22:30

## 2019-12-03 RX ADMIN — Medication 25 MILLIGRAM(S): at 22:10

## 2019-12-03 RX ADMIN — SODIUM CHLORIDE 1000 MILLILITER(S): 9 INJECTION INTRAMUSCULAR; INTRAVENOUS; SUBCUTANEOUS at 22:10

## 2019-12-03 NOTE — ED PROVIDER NOTE - OBJECTIVE STATEMENT
59 yof pw hematemesis since Monday, and then hematochezia today.  hx of gastritis per endoscopy 2 yr ago, normal colonoscopy.  previously on a medicine for stomach but stopped 1 yr ago, unclear which one.  denies excessive NSAIDs use or alcohol usage.  (last alcohol was Sunday during a party, and last motrin was this weekend).  no blood thinners.  reports overall increased stress in the last few months but nothing acutely worsened in the last few days/weeks.  no cp/sob.  no hx of cad.  reports vomitus as red, and stool as brown w/ red mixture.  hx of hemorrhoids.

## 2019-12-03 NOTE — ED ADULT NURSE NOTE - OBJECTIVE STATEMENT
58 y/o F c/o bloody vomitus since Monday and bloody diarrhea starting today. PMH of gastritis and hemorrhoids. Pt denies dysuria, fever, CP, SOB. Pt states she feels dizzy when changing positions. Pt has not fainted. Pt denies daily med use.

## 2019-12-03 NOTE — ED PROVIDER NOTE - PHYSICAL EXAMINATION
Physical Exam  GEN: Awake, alert, non-toxic appearing,   EYES: full EOMI,  ENT: External inspection normal, normal voice,   HEAD: atraumatic  NECK: FROM neck, supple, no meningismus, trachea midline, no JVD  CV: tachycardia  RESP: cta bl, no tachypnea, no hypoxia, no resp distress,  GI: +BS, Soft, nontender, no guarding/rebound, external hemorrhoids noted, brown stool in vault  MSK: FROM all 4 extremities,   SKIN: Color normal for race, warm and dry, no rash  NEURO: Oriented x3, CN 2-12 grossly intact, normal motor, normal sensory

## 2019-12-03 NOTE — ED PROVIDER NOTE - CLINICAL SUMMARY MEDICAL DECISION MAKING FREE TEXT BOX
hematemesis, hx of gastritis, no obvious other inciting factors, brown stool w/o melena in vault w/ external hemorrhoids, brbpr possibly 2/2 vs brisk UGIB, tachycardia upon arrival but pt reports being anxious and nervous, will start on protonix, labs, orthostatics, ekg, admission for GI workup

## 2019-12-03 NOTE — ED PROVIDER NOTE - PROGRESS NOTE DETAILS
pt accepted to [facility], no beds available.  will rpt cbc in AM, will expect some amount of hemodilution after initial IV bolus. pt accepted to T, no beds available.  will rpt cbc in AM, will expect some amount of hemodilution after initial IV bolus.  noted elevated lft, and decreased platelet, no hx of cirrhosis or varices, presentation of bleeding not c/w variceal bleeding pt accepted to T, no beds available.  will rpt cbc in AM, will expect some amount of hemodilution after initial IV bolus.  noted elevated lft, and decreased platelet, no hx of cirrhosis or varices, presentation of bleeding not c/w variceal bleeding.  can have inpatient consult GI for further evaluation/management as appropriate. rpt cbc after 2 L NS is 7.9, pt no further episodes of hematemesis or brbpr, 7.9 is possibly combination of hemodilution and ongoing UGIB.  pt vitals appear stable, resting comfortably, does not feel there's a need for upgrade to telemetry but still should be admitted for GI consultation as needed.  will rpt CBC in 4 hr for trending if pt still in [facility].

## 2019-12-03 NOTE — ED ADULT NURSE NOTE - NSIMPLEMENTINTERV_GEN_ALL_ED
Implemented All Universal Safety Interventions:  [place] to call system. Call bell, personal items and telephone within reach. Instruct patient to call for assistance. Room bathroom lighting operational. Non-slip footwear when patient is off stretcher. Physically safe environment: no spills, clutter or unnecessary equipment. Stretcher in lowest position, wheels locked, appropriate side rails in place.

## 2019-12-03 NOTE — ED PROVIDER NOTE - CARE PLAN
Principal Discharge DX:	Hematemesis Principal Discharge DX:	Hematemesis  Secondary Diagnosis:	Hypokalemia  Secondary Diagnosis:	Hypomagnesemia

## 2019-12-03 NOTE — ED ADULT TRIAGE NOTE - CHIEF COMPLAINT QUOTE
Pt complaining of bloody emesisx1 day  and bloody stool that started tonight. PT complaining of lightheaded. Pt denies chest pain and sob.

## 2019-12-04 DIAGNOSIS — D53.9 NUTRITIONAL ANEMIA, UNSPECIFIED: ICD-10-CM

## 2019-12-04 DIAGNOSIS — R79.89 OTHER SPECIFIED ABNORMAL FINDINGS OF BLOOD CHEMISTRY: ICD-10-CM

## 2019-12-04 DIAGNOSIS — Z91.89 OTHER SPECIFIED PERSONAL RISK FACTORS, NOT ELSEWHERE CLASSIFIED: ICD-10-CM

## 2019-12-04 DIAGNOSIS — R63.8 OTHER SYMPTOMS AND SIGNS CONCERNING FOOD AND FLUID INTAKE: ICD-10-CM

## 2019-12-04 DIAGNOSIS — K92.0 HEMATEMESIS: ICD-10-CM

## 2019-12-04 DIAGNOSIS — K92.1 MELENA: ICD-10-CM

## 2019-12-04 DIAGNOSIS — R74.0 NONSPECIFIC ELEVATION OF LEVELS OF TRANSAMINASE AND LACTIC ACID DEHYDROGENASE [LDH]: ICD-10-CM

## 2019-12-04 DIAGNOSIS — D69.6 THROMBOCYTOPENIA, UNSPECIFIED: ICD-10-CM

## 2019-12-04 DIAGNOSIS — D68.9 COAGULATION DEFECT, UNSPECIFIED: ICD-10-CM

## 2019-12-04 LAB
ALBUMIN SERPL ELPH-MCNC: 2.7 G/DL — LOW (ref 3.4–5)
ALP SERPL-CCNC: 157 U/L — HIGH (ref 40–120)
ALT FLD-CCNC: 32 U/L — SIGNIFICANT CHANGE UP (ref 12–42)
ANION GAP SERPL CALC-SCNC: 11 MMOL/L — SIGNIFICANT CHANGE UP (ref 5–17)
ANION GAP SERPL CALC-SCNC: 7 MMOL/L — LOW (ref 9–16)
AST SERPL-CCNC: 59 U/L — HIGH (ref 15–37)
BILIRUB SERPL-MCNC: 2.3 MG/DL — HIGH (ref 0.2–1.2)
BLD GP AB SCN SERPL QL: NEGATIVE — SIGNIFICANT CHANGE UP
BLD GP AB SCN SERPL QL: NEGATIVE — SIGNIFICANT CHANGE UP
BUN SERPL-MCNC: 11 MG/DL — SIGNIFICANT CHANGE UP (ref 7–23)
BUN SERPL-MCNC: 17 MG/DL — SIGNIFICANT CHANGE UP (ref 7–23)
CALCIUM SERPL-MCNC: 8.4 MG/DL — LOW (ref 8.5–10.5)
CALCIUM SERPL-MCNC: 8.8 MG/DL — SIGNIFICANT CHANGE UP (ref 8.4–10.5)
CHLORIDE SERPL-SCNC: 106 MMOL/L — SIGNIFICANT CHANGE UP (ref 96–108)
CHLORIDE SERPL-SCNC: 106 MMOL/L — SIGNIFICANT CHANGE UP (ref 96–108)
CO2 SERPL-SCNC: 22 MMOL/L — SIGNIFICANT CHANGE UP (ref 22–31)
CO2 SERPL-SCNC: 28 MMOL/L — SIGNIFICANT CHANGE UP (ref 22–31)
CREAT SERPL-MCNC: 0.51 MG/DL — SIGNIFICANT CHANGE UP (ref 0.5–1.3)
CREAT SERPL-MCNC: 0.59 MG/DL — SIGNIFICANT CHANGE UP (ref 0.5–1.3)
GLUCOSE SERPL-MCNC: 132 MG/DL — HIGH (ref 70–99)
GLUCOSE SERPL-MCNC: 90 MG/DL — SIGNIFICANT CHANGE UP (ref 70–99)
HCT VFR BLD CALC: 21.3 % — LOW (ref 34.5–45)
HCT VFR BLD CALC: 22.1 % — LOW (ref 34.5–45)
HCT VFR BLD CALC: 22.6 % — LOW (ref 34.5–45)
HCT VFR BLD CALC: 26.1 % — LOW (ref 34.5–45)
HGB BLD-MCNC: 7.1 G/DL — LOW (ref 11.5–15.5)
HGB BLD-MCNC: 7.4 G/DL — LOW (ref 11.5–15.5)
HGB BLD-MCNC: 7.9 G/DL — LOW (ref 11.5–15.5)
HGB BLD-MCNC: 8.3 G/DL — LOW (ref 11.5–15.5)
MAGNESIUM SERPL-MCNC: 1.6 MG/DL — SIGNIFICANT CHANGE UP (ref 1.6–2.6)
MANUAL SMEAR VERIFICATION: SIGNIFICANT CHANGE UP
MCHC RBC-ENTMCNC: 31.8 GM/DL — LOW (ref 32–36)
MCHC RBC-ENTMCNC: 32.3 GM/DL — SIGNIFICANT CHANGE UP (ref 32–36)
MCHC RBC-ENTMCNC: 34.7 GM/DL — SIGNIFICANT CHANGE UP (ref 32–36)
MCHC RBC-ENTMCNC: 35 GM/DL — SIGNIFICANT CHANGE UP (ref 32–36)
MCHC RBC-ENTMCNC: 37.2 PG — HIGH (ref 27–34)
MCHC RBC-ENTMCNC: 37.4 PG — HIGH (ref 27–34)
MCHC RBC-ENTMCNC: 38.2 PG — HIGH (ref 27–34)
MCHC RBC-ENTMCNC: 38.5 PG — HIGH (ref 27–34)
MCV RBC AUTO: 109.2 FL — HIGH (ref 80–100)
MCV RBC AUTO: 110.9 FL — HIGH (ref 80–100)
MCV RBC AUTO: 115.4 FL — HIGH (ref 80–100)
MCV RBC AUTO: 117.6 FL — HIGH (ref 80–100)
NRBC # BLD: 0 /100 WBCS — SIGNIFICANT CHANGE UP (ref 0–0)
PHOSPHATE SERPL-MCNC: 2.2 MG/DL — LOW (ref 2.5–4.5)
PLAT MORPH BLD: NORMAL — SIGNIFICANT CHANGE UP
PLATELET # BLD AUTO: 76 K/UL — LOW (ref 150–400)
PLATELET # BLD AUTO: 80 K/UL — LOW (ref 150–400)
PLATELET # BLD AUTO: 88 K/UL — LOW (ref 150–400)
PLATELET # BLD AUTO: 95 K/UL — LOW (ref 150–400)
POTASSIUM SERPL-MCNC: 4.2 MMOL/L — SIGNIFICANT CHANGE UP (ref 3.5–5.3)
POTASSIUM SERPL-MCNC: 4.6 MMOL/L — SIGNIFICANT CHANGE UP (ref 3.5–5.3)
POTASSIUM SERPL-SCNC: 4.2 MMOL/L — SIGNIFICANT CHANGE UP (ref 3.5–5.3)
POTASSIUM SERPL-SCNC: 4.6 MMOL/L — SIGNIFICANT CHANGE UP (ref 3.5–5.3)
PROT SERPL-MCNC: 5.7 G/DL — LOW (ref 6.4–8.2)
RBC # BLD: 1.88 M/UL — LOW (ref 3.8–5.2)
RBC # BLD: 1.92 M/UL — LOW (ref 3.8–5.2)
RBC # BLD: 2.07 M/UL — LOW (ref 3.8–5.2)
RBC # BLD: 2.22 M/UL — LOW (ref 3.8–5.2)
RBC # FLD: 13.3 % — SIGNIFICANT CHANGE UP (ref 10.3–14.5)
RBC # FLD: 13.4 % — SIGNIFICANT CHANGE UP (ref 10.3–14.5)
RBC BLD AUTO: SIGNIFICANT CHANGE UP
RH IG SCN BLD-IMP: NEGATIVE — SIGNIFICANT CHANGE UP
RH IG SCN BLD-IMP: NEGATIVE — SIGNIFICANT CHANGE UP
SODIUM SERPL-SCNC: 139 MMOL/L — SIGNIFICANT CHANGE UP (ref 135–145)
SODIUM SERPL-SCNC: 141 MMOL/L — SIGNIFICANT CHANGE UP (ref 132–145)
WBC # BLD: 3.43 K/UL — LOW (ref 3.8–10.5)
WBC # BLD: 3.7 K/UL — LOW (ref 3.8–10.5)
WBC # BLD: 4.25 K/UL — SIGNIFICANT CHANGE UP (ref 3.8–10.5)
WBC # BLD: 4.58 K/UL — SIGNIFICANT CHANGE UP (ref 3.8–10.5)
WBC # FLD AUTO: 3.43 K/UL — LOW (ref 3.8–10.5)
WBC # FLD AUTO: 3.7 K/UL — LOW (ref 3.8–10.5)
WBC # FLD AUTO: 4.25 K/UL — SIGNIFICANT CHANGE UP (ref 3.8–10.5)
WBC # FLD AUTO: 4.58 K/UL — SIGNIFICANT CHANGE UP (ref 3.8–10.5)

## 2019-12-04 PROCEDURE — 99222 1ST HOSP IP/OBS MODERATE 55: CPT

## 2019-12-04 PROCEDURE — 99231 SBSQ HOSP IP/OBS SF/LOW 25: CPT

## 2019-12-04 PROCEDURE — 99223 1ST HOSP IP/OBS HIGH 75: CPT | Mod: GC

## 2019-12-04 RX ORDER — SODIUM CHLORIDE 9 MG/ML
1000 INJECTION INTRAMUSCULAR; INTRAVENOUS; SUBCUTANEOUS ONCE
Refills: 0 | Status: COMPLETED | OUTPATIENT
Start: 2019-12-04 | End: 2019-12-04

## 2019-12-04 RX ORDER — OCTREOTIDE ACETATE 200 UG/ML
50 INJECTION, SOLUTION INTRAVENOUS; SUBCUTANEOUS ONCE
Refills: 0 | Status: DISCONTINUED | OUTPATIENT
Start: 2019-12-04 | End: 2019-12-04

## 2019-12-04 RX ORDER — OCTREOTIDE ACETATE 200 UG/ML
50 INJECTION, SOLUTION INTRAVENOUS; SUBCUTANEOUS
Qty: 500 | Refills: 0 | Status: DISCONTINUED | OUTPATIENT
Start: 2019-12-04 | End: 2019-12-04

## 2019-12-04 RX ORDER — CEFTRIAXONE 500 MG/1
1000 INJECTION, POWDER, FOR SOLUTION INTRAMUSCULAR; INTRAVENOUS ONCE
Refills: 0 | Status: COMPLETED | OUTPATIENT
Start: 2019-12-04 | End: 2019-12-04

## 2019-12-04 RX ORDER — SODIUM CHLORIDE 9 MG/ML
1000 INJECTION INTRAMUSCULAR; INTRAVENOUS; SUBCUTANEOUS
Refills: 0 | Status: DISCONTINUED | OUTPATIENT
Start: 2019-12-04 | End: 2019-12-05

## 2019-12-04 RX ORDER — CEFTRIAXONE 500 MG/1
1000 INJECTION, POWDER, FOR SOLUTION INTRAMUSCULAR; INTRAVENOUS EVERY 24 HOURS
Refills: 0 | Status: DISCONTINUED | OUTPATIENT
Start: 2019-12-05 | End: 2019-12-05

## 2019-12-04 RX ORDER — SOD SULF/SODIUM/NAHCO3/KCL/PEG
2000 SOLUTION, RECONSTITUTED, ORAL ORAL ONCE
Refills: 0 | Status: COMPLETED | OUTPATIENT
Start: 2019-12-04 | End: 2019-12-04

## 2019-12-04 RX ORDER — CEFTRIAXONE 500 MG/1
INJECTION, POWDER, FOR SOLUTION INTRAMUSCULAR; INTRAVENOUS
Refills: 0 | Status: DISCONTINUED | OUTPATIENT
Start: 2019-12-04 | End: 2019-12-05

## 2019-12-04 RX ORDER — INFLUENZA VIRUS VACCINE 15; 15; 15; 15 UG/.5ML; UG/.5ML; UG/.5ML; UG/.5ML
0.5 SUSPENSION INTRAMUSCULAR ONCE
Refills: 0 | Status: DISCONTINUED | OUTPATIENT
Start: 2019-12-04 | End: 2019-12-05

## 2019-12-04 RX ORDER — DIPHENHYDRAMINE HCL 50 MG
25 CAPSULE ORAL ONCE
Refills: 0 | Status: COMPLETED | OUTPATIENT
Start: 2019-12-04 | End: 2019-12-04

## 2019-12-04 RX ORDER — SOD SULF/SODIUM/NAHCO3/KCL/PEG
2000 SOLUTION, RECONSTITUTED, ORAL ORAL ONCE
Refills: 0 | Status: COMPLETED | OUTPATIENT
Start: 2019-12-05 | End: 2019-12-05

## 2019-12-04 RX ORDER — METOCLOPRAMIDE HCL 10 MG
10 TABLET ORAL ONCE
Refills: 0 | Status: DISCONTINUED | OUTPATIENT
Start: 2019-12-04 | End: 2019-12-04

## 2019-12-04 RX ORDER — PANTOPRAZOLE SODIUM 20 MG/1
40 TABLET, DELAYED RELEASE ORAL EVERY 12 HOURS
Refills: 0 | Status: DISCONTINUED | OUTPATIENT
Start: 2019-12-04 | End: 2019-12-05

## 2019-12-04 RX ORDER — PANTOPRAZOLE SODIUM 20 MG/1
8 TABLET, DELAYED RELEASE ORAL
Qty: 80 | Refills: 0 | Status: DISCONTINUED | OUTPATIENT
Start: 2019-12-04 | End: 2019-12-04

## 2019-12-04 RX ORDER — PANTOPRAZOLE SODIUM 20 MG/1
40 TABLET, DELAYED RELEASE ORAL EVERY 12 HOURS
Refills: 0 | Status: DISCONTINUED | OUTPATIENT
Start: 2019-12-04 | End: 2019-12-04

## 2019-12-04 RX ADMIN — SODIUM CHLORIDE 1000 MILLILITER(S): 9 INJECTION INTRAMUSCULAR; INTRAVENOUS; SUBCUTANEOUS at 10:48

## 2019-12-04 RX ADMIN — Medication 2 GRAM(S): at 00:50

## 2019-12-04 RX ADMIN — Medication 2000 MILLILITER(S): at 21:13

## 2019-12-04 RX ADMIN — SODIUM CHLORIDE 125 MILLILITER(S): 9 INJECTION INTRAMUSCULAR; INTRAVENOUS; SUBCUTANEOUS at 08:56

## 2019-12-04 RX ADMIN — CEFTRIAXONE 100 MILLIGRAM(S): 500 INJECTION, POWDER, FOR SOLUTION INTRAMUSCULAR; INTRAVENOUS at 20:40

## 2019-12-04 RX ADMIN — Medication 1 MILLIGRAM(S): at 08:56

## 2019-12-04 RX ADMIN — Medication 100 MILLIEQUIVALENT(S): at 03:30

## 2019-12-04 RX ADMIN — SODIUM CHLORIDE 125 MILLILITER(S): 9 INJECTION INTRAMUSCULAR; INTRAVENOUS; SUBCUTANEOUS at 20:41

## 2019-12-04 RX ADMIN — Medication 25 MILLIGRAM(S): at 03:30

## 2019-12-04 RX ADMIN — SODIUM CHLORIDE 125 MILLILITER(S): 9 INJECTION INTRAMUSCULAR; INTRAVENOUS; SUBCUTANEOUS at 15:02

## 2019-12-04 RX ADMIN — Medication 10 MILLIEQUIVALENT(S): at 01:08

## 2019-12-04 RX ADMIN — Medication 10 MILLIEQUIVALENT(S): at 04:41

## 2019-12-04 RX ADMIN — Medication 100 MILLIEQUIVALENT(S): at 01:08

## 2019-12-04 NOTE — H&P ADULT - PROBLEM SELECTOR PLAN 3
3.0 on admission, cleared to 1.5 with fluids  not meeting SIRS criteria, likely hypoperfusion 2/2 hypovolemia 2/2 vomiting/diarrhea  no further trending

## 2019-12-04 NOTE — H&P ADULT - PROBLEM SELECTOR PLAN 2
BRBPR since Tuesday, improved today but persists  per pt was very watery but no risk factors for C diff like recent abx use, health care setting exposure, also improved w/o treatment  likely 2/2 brisk UGIB  GI aware, likely scope tomorrow  IV PPI BID BRBPR since Tuesday, improved today but persists  per pt was very watery but no risk factors for C diff like recent abx use, health care setting exposure, also improved w/o treatment  external hemorrhoids seen on exam -- likely cause vs brisk UGIB  GI aware, likely scope tomorrow  IV PPI BID

## 2019-12-04 NOTE — H&P ADULT - NSHPPHYSICALEXAM_GEN_ALL_CORE
PHYSICAL EXAM:    Constitutional: WDWN, lying comfortably in bed, NAD  Head: Nc/At  Eyes: PERRL, EOMI, clear conjunctiva  ENT: no nasal discharge; uvula midline, no oropharyngeal erythema or exudates; MMM  Neck: supple; no JVD or thyromegaly  Respiratory: CTA b/l, no wheezes, rales, or rhonchi  Cardiac: +S1/S2, +RRR, no murmurs, rubs, or gallops  Gastrointestinal: soft, non-tender, non-distended, no rebound/guarding, no palpable masses, normoactive bowel sounds x4  Back: spine midline, no bony tenderness or step-offs; no CVAT B/L  Extremities: WWP, no clubbing or cyanosis, no peripheral edema  Musculoskeletal: NROM x4; no joint swelling, tenderness or erythema  Vascular: 2+ radial and DP pulses b/l  Dermatologic: skin warm, dry and intact, no rashes, wounds, or scars  Lymphatic: no submandibular or cervical LAD  Neurologic: AAOx3, CNII-XII grossly intact, no focal deficits  Psychiatric: affect and characteristics of appearance, verbalizations, behaviors are appropriate PHYSICAL EXAM:    Constitutional: WDWN, lying comfortably in bed, NAD  Head: Nc/At  Eyes: PERRL, EOMI, clear conjunctiva  ENT: no nasal discharge; uvula midline, no oropharyngeal erythema or exudates; MMM  Neck: supple; no JVD or thyromegaly  Respiratory: CTA b/l, no wheezes, rales, or rhonchi  Cardiac: +S1/S2, +RRR, no murmurs, rubs, or gallops  Gastrointestinal: soft, non-tender, non-distended, no rebound/guarding, no palpable masses, normoactive bowel sounds x4 (+) hepatomegaly  Back: spine midline, no bony tenderness or step-offs; no CVAT B/L  Extremities: WWP, no clubbing or cyanosis, no peripheral edema  Musculoskeletal: NROM x4; no joint swelling, tenderness or erythema  Vascular: 2+ radial and DP pulses b/l  Dermatologic: skin warm, dry and intact, no rashes, wounds, or scars  Lymphatic: no submandibular or cervical LAD  Neurologic: AAOx3, CNII-XII grossly intact, 5/5 str all 4 ext, sens intact bilat, no focal deficits  Psychiatric: affect and characteristics of appearance, verbalizations, behaviors are appropriate

## 2019-12-04 NOTE — H&P ADULT - ATTENDING COMMENTS
pt with BPs 90s/60s after 3 L NS bolus  hb downtrending 11-->7.4  (+) melena on JACLYN  suspected etoh w/drawal  CIWA ~3 currently (tremor/mildly anxious)    p/  EGD tonight as per GI  NPO  IVFs  CIWAs q4hrs  ativan prn CIWA >8  ppi bid (IV)  octreotide gtt  ruq u/s  hepaitits panel  ICU consult

## 2019-12-04 NOTE — CONSULT NOTE ADULT - ASSESSMENT
59YOF with PMH of possible alcohol abuse (per chart, PCP, patient denies), lyme disease, pyelonephritis who presented to [facility] on 12/3 c/o 2 days of hematemesis/hematochezia, found to have acute drop in Hb, admitted for acute anemia 2/2 UGIB (variceal vs PUD).     #Acute Anemia   Patient with acute drop in Hb from 11.1 -->7.4 in the setting of hematemesis/hematochezia with chronic alcohol abuse, with concern for variceal vs PUD bleeding.   - S/p Emergent EGD  - Follow up GI recs  - S/p PPI 80 IVP c/w PPI   - C/w Octreotide gtt with concern for variceal bleed   - C/w 2 large bore IVs  - Maintain active type and screen q72h   - CBC BID, unless active bleeding   - Monitor for episodes of hematemesis/hematochezia/melena   - Transfuse for Hb <7 and active bleeding       DISPO: 59YOF with PMH of possible alcohol abuse (per chart, PCP, patient denies), lyme disease, pyelonephritis who presented to [facility] on 12/3 c/o 2 days of hematemesis/hematochezia, found to have acute drop in Hb, admitted for acute anemia 2/2 UGIB (variceal vs PUD).     #Acute Anemia   Patient with acute drop in Hb from 11.1 -->7.4 in the setting of hematemesis/hematochezia with chronic alcohol abuse, with concern for variceal vs PUD bleeding, initially s/p Emergent EGD illustrating no evidence of variceal bleeding, or active clot/bleeding ulcer. Patient with lower sBP90s initially with improvement to 100-110s.   - Follow up GI recs  - S/p PPI 80 IVP c/w PPI IV BID   - C/w 2 large bore IVs  - Maintain active type and screen q72h   - CBC BID, unless active bleeding   - Monitor for episodes of hematemesis/hematochezia/melena   - Transfuse for Hb <7, or active bleeding     #UGIB  Likely 2/2 ulcers seen on EGD this evening. Plan as above.   - Avoid NSAIDs       DISPO: 59YOF with PMH of possible alcohol abuse (per chart, PCP, patient denies), lyme disease, pyelonephritis who presented to [facility] on 12/3 c/o 2 days of hematemesis/hematochezia, found to have acute drop in Hb, admitted for acute anemia 2/2 UGIB (variceal vs PUD).     #Acute Anemia   Patient with acute drop in Hb from 11.1 -->7.4 in the setting of hematemesis/hematochezia with chronic alcohol abuse, with concern for variceal vs PUD bleeding, initially s/p Emergent EGD illustrating no evidence of variceal bleeding, or active clot/bleeding ulcer. Patient with lower sBP90s initially with improvement to 100-110s.   - Follow up GI recs  - S/p PPI 80 IVP c/w PPI IV BID   - C/w 2 large bore IVs  - Maintain active type and screen q72h   - CBC BID, unless active bleeding   - Monitor for episodes of hematemesis/hematochezia/melena   - Transfuse for Hb <7, or active bleeding     #UGIB  Likely 2/2 ulcers seen on EGD this evening. Plan as above.   - Avoid NSAIDs   - Plan as above.     #Cirrhosis R/o  Patient with chronic alcohol abuse along with labs indicative of decreased synthesis/ liver disease- thrombocytopenia, elevated INR, low Alb, transaminitis.   - MELD score 12   - C/w CBC, CMP, Coags, daily   - Hepatology follow up upon discharge     #Alcohol Abuse   Endorsed recent attempt to quit drinking, with no symptoms of withdrawal. S/p Librium and ativan dose. No active symptoms of withdrawal at this time.   - CIWA protocol   - C/w CMP, mag, and phos   - MVI, folate, thiamine qd     DISPO: 59YOF with PMH of possible alcohol abuse (per chart, PCP, patient denies), lyme disease, pyelonephritis who presented to [facility] on 12/3 c/o 2 days of hematemesis/hematochezia, found to have acute drop in Hb, admitted for acute anemia 2/2 UGIB (variceal vs PUD).     #Acute Anemia   Patient with acute drop in Hb from 11.1 -->7.4 in the setting of hematemesis/hematochezia with chronic alcohol abuse, with concern for variceal vs PUD bleeding, initially s/p Emergent EGD illustrating no evidence of variceal bleeding, or active clot/bleeding ulcer, evidence of duodenitis, mucosal mass and HTN gastropathy. No clear source of bleeding at this time. Patient with lower sBP90s initially with improvement to 100-110s.   - Follow up GI recs  - S/p PPI 80 IVP c/w PPI 40 IV BID   - C/w 2 large bore IVs  - Maintain active type and screen q72h   - CBC BID, unless active bleeding   - Monitor for episodes of hematemesis/hematochezia/melena   - Ordered 1UpRBCs in setting of Hb trending downward last 7.1 in setting of prior active bleed   - Transfuse for Hb <7, or active bleeding   - Prep to be given tonight and AM for c-scope in AM (NPO after 12am)       #Cirrhosis R/o  Patient with chronic alcohol abuse along with labs indicative of decreased synthesis/ liver disease- thrombocytopenia, elevated INR, low Alb, transaminitis.   - MELD score 12   - C/w CBC, CMP, Coags, daily   - RUQ u/s   - As per GI c/w CFTX 1g qd for 5 days for SBP ppx   - Hepatology follow up upon discharge     #Alcohol Abuse   Endorsed recent attempt to quit drinking, with no symptoms of withdrawal. S/p Librium and ativan dose. No active symptoms of withdrawal at this time.   - CIWA protocol   - C/w CMP, mag, and phos   - MVI, folate, thiamine qd     DISPO: RMF as patient hemodynamically stable with no active episodes of bleeding and no signs of active bleeding on EGD. 59YOF with PMH of possible alcohol abuse (per chart, PCP, patient denies), lyme disease, pyelonephritis who presented to [facility] on 12/3 c/o 2 days of hematemesis/hematochezia, found to have acute drop in Hb, admitted for acute blood loss anemia 2/2 UGIB (variceal vs PUD).     #Acute Anemia   Patient with acute drop in Hb from 11.1 -->7.4 in the setting of hematemesis/hematochezia with chronic alcohol abuse, with concern for variceal vs PUD bleeding, initially s/p Emergent EGD illustrating no evidence of variceal bleeding, or active clot/bleeding ulcer, evidence of duodenitis, mucosal mass and HTN gastropathy. No clear source of bleeding at this time. Patient with lower sBP90s initially with improvement to 100-110s.   - Follow up GI recs  - S/p PPI 80 IVP c/w PPI 40 IV BID   - C/w 2 large bore IVs  - Maintain active type and screen q72h   - CBC BID, unless active bleeding   - Monitor for episodes of hematemesis/hematochezia/melena   - Ordered 1UpRBCs in setting of Hb trending downward last 7.1 in setting of prior active bleed   - Transfuse for Hb <7, or active bleeding   - Prep to be given tonight and AM for c-scope in AM (NPO after 12am)       #Cirrhosis R/o  Patient with chronic alcohol abuse along with labs indicative of decreased synthesis/ liver disease- thrombocytopenia, elevated INR, low Alb, transaminitis.   - MELD score 12   - C/w CBC, CMP, Coags, daily   - RUQ u/s   - As per GI c/w CFTX 1g qd for 5 days for SBP ppx   - Hepatology follow up upon discharge     #Alcohol Abuse   Endorsed recent attempt to quit drinking, with no symptoms of withdrawal. S/p Librium and ativan dose. No active symptoms of withdrawal at this time.   - CIWA protocol   - C/w CMP, mag, and phos   - MVI, folate, thiamine qd     DISPO: RMF as patient hemodynamically stable with no active episodes of bleeding and no signs of active bleeding on EGD.

## 2019-12-04 NOTE — CONSULT NOTE ADULT - ASSESSMENT
58 y/o F PMH suspected alcohol abuse presented to [facility] with 2 days of hematemesis/hematochezia    #Hematemesis and Hematochezia  Patient initially presented with reported hematochezia and hematemesis and was found to have stable hemoglobin and orthostatic at [facility].  Over the course of pending transfer to [facility], patient developed withdrawal symptoms and drop in hemoglobin and blood pressure.  At time of transfer, she was also found to have continue downtrend in hemoglobin with rectal exam notable for hemorrhoid and melenic stool.  She was taken for EGD given unclear hx with low platelet and elevated liver biochemistries concerning for cirrhosis.  EGD was unrevealing for acute bleeding but was notable for small hiatal hernia, portal hypertensive gastropathy, duodenitis, and 1-2 cm submucosal gastric lesion.    -Continue PPI 40 mg BID  -Continue Ceftriaxone 1g qd for 5d  -Trend cbc and transfuse for hgb <7 g/dL  -Maintain active T&S and large bore IV  -Abdominal US w/ doppler  -Will consider role of EUS for submucosal lesion  -Obtain B12   -CLD and start split dose bowel prep with golytely: 2L now and 2L tomorrow morning at 5AM to 7AM  -NPO at [place]    #Alcoholic hepatitis, DF 12  Patient with examination notable for withdrawal and elevated liver biochemistries.    -ERICHWA  -MVI/Thiamine/folate  -Encourage alcohol cessation    Case d/w Dr. Shrestha

## 2019-12-04 NOTE — H&P ADULT - PROBLEM SELECTOR PLAN 7
elevated INR (1.26), PT (14)  as above, likely 2/2 alcohol use  not on blood thinners  monitor daily

## 2019-12-04 NOTE — CONSULT NOTE ADULT - SUBJECTIVE AND OBJECTIVE BOX
[facility] SERVICE CONSULTATION NOTE  59YOF with PMH of possible alcohol abuse (per chart, PCP, patient denies), lyme disease, pyelonephritis who presented to [facility] on 12/3 c/o 2 days of hematemesis/hematochezia. Monday patient endorsed poor appetite with subsequent multiple episodes of hematemesis bright red (around a drinking glass volume) with sharp abd pain. Tuesday patient continued with these episodes along with loose stool w/ BRBPR. She endorsed this was a first time occurrence. She also mentioned she had binge drank prior champagne the weekend prior and at baseline has a heavy drinking habit (2-4 drinks to a bottle of liquor almost daily). She denied NSAID use and endoscopy/colonoscopy 2.5 years ago revealing early gastric ulcer, for which patient was started on an oral medication (doesn't know name). Pt at bedside at 3pm denied any CP, SOB, pain w/ defecation/straining, and no longer experiencing abdominal pain. Patient no longer having anymore active hematemesis or hematochezia episodes since presentation. At [facility], patient initially tachycardic to 120s and with BP /60s, with labs illustrating a Hb11.1 along with a Lactate of 3, therefore 3L NS were given, along with PPI 80IVP and Zofran 4IVP. Patient likely dry and s/p hemodilution of Hb, with AM Hb illustrating drop to 7.9. Patient then transferred to [facility] with plan for emergent scope. In setting of concern for variceal bleed with labs illustrating likely cirrhosis (thrombocytopenia, elevated INR, low Alb), ICU was consulted for likely need of tele monitoring post EGD.       ED vitals t 98.7, p 125, /65, RR 18, 98% on RA, orthostatics negative  ED labs Hg 11.1 on admission (12/3 evening), 12/4 morning Hg 7.9 -> 7.4; , plt 129, INR 1.26, K 3.1 (repleted to 4.2), t bili 2.6, , AST 86, ALT 45, lactate 3, mag 2.5  s/p IV benadryl, IV mag, PO/IV K, librium 25, ativan 1mg, IV zofran, IV protonix 80, 3L NS  EKG: sinus tach to 111, prolonged     VITAL SIGNS:  ICU Vital Signs Last 24 Hrs  T(C): 36.8 (04 Dec 2019 17:29), Max: 37.3 (04 Dec 2019 06:06)  T(F): 98.3 (04 Dec 2019 17:29), Max: 99.1 (04 Dec 2019 06:06)  HR: 99 (04 Dec 2019 17:29) (75 - 125)  BP: 112/76 (04 Dec 2019 17:29) (89/60 - 120/83)  BP(mean): --  ABP: --  ABP(mean): --  RR: 18 (04 Dec 2019 17:29) (16 - 20)  SpO2: 96% (04 Dec 2019 17:29) (96% - 100%)    CAPILLARY BLOOD GLUCOSE          PHYSICAL EXAM:  Constitutional: resting comfortably in bed, NAD  HEENT: NC/AT; PERRL, anicteric sclera; no oropharyngeal erythema or exudates; MMM  Neck: supple, no appreciable JVD  Respiratory: CTA B/L, no W/R/R; respirations appear non-labored, conversive in full sentences  Cardiovascular: +S1/S2, RRR  Gastrointestinal: abdomen soft, NT/ND  Extremities: WWP; no clubbing, cyanosis or edema  Vascular: 2+ radial, femoral, and DP/PT pulses B/L  Dermatologic: skin normal color and turgor; no visible rashes  Neurological:     LABS:                        7.1    3.70  )-----------( 76       ( 04 Dec 2019 17:05 )             22.1     12-04    141  |  106  |  17  ----------------------------<  90  4.2   |  28  |  0.59    Ca    8.4<L>      04 Dec 2019 05:07  Mg     1.5     12-03    TPro  5.7<L>  /  Alb  2.7<L>  /  TBili  2.3<H>  /  DBili  x   /  AST  59<H>  /  ALT  32  /  AlkPhos  157<H>  12-04    PT/INR - ( 03 Dec 2019 21:21 )   PT: 14.0 sec;   INR: 1.26          PTT - ( 03 Dec 2019 21:21 )  PTT:28.9 sec  Lactate, Blood: 1.7 mmoL/L (12-03-19 @ 22:42)  Lactate, Blood: 3.0 mmoL/L (12-03-19 @ 21:43)              EKG: Reviewed.    RADIOLOGY & ADDITIONAL TESTS: Reviewed. --INCOMPLETE---     [facility] SERVICE CONSULTATION NOTE  59YOF with PMH of possible alcohol abuse (per chart, PCP, patient denies), lyme disease, pyelonephritis who presented to [facility] on 12/3 c/o 2 days of hematemesis/hematochezia. Monday patient endorsed poor appetite with subsequent multiple episodes of hematemesis bright red (around a drinking glass volume) with sharp abd pain. Tuesday patient continued with these episodes along with loose stool w/ BRBPR. She endorsed this was a first time occurrence. She also mentioned she had binge drank prior champagne the weekend prior and at baseline has a heavy drinking habit (2-4 drinks to a bottle of liquor almost daily). She denied NSAID use and endoscopy/colonoscopy 2.5 years ago revealing early gastric ulcer, for which patient was started on an oral medication (doesn't know name). Pt at bedside at 3pm denied any CP, SOB, pain w/ defecation/straining, and no longer experiencing abdominal pain. Patient no longer having anymore active hematemesis or hematochezia episodes since presentation. At [facility], patient initially tachycardic to 120s and with BP /60s, with labs illustrating a Hb11.1 along with a Lactate of 3, therefore 3L NS were given, along with PPI 80IVP and Zofran 4IVP. Patient likely dry and s/p hemodilution of Hb, with AM Hb illustrating drop to 7.9. Patient then transferred to [facility] with plan for emergent scope in setting of drop in Hb. In setting of concern for variceal bleed with labs illustrating likely cirrhosis (thrombocytopenia, elevated INR, low Alb), ICU was consulted for likely need of tele monitoring post EGD.     ED vitals: afeb, --> 90, BP /60s, R18 100% RA, orthostatic neg  Labs s/f: Hb 11.1 on admission (12/3 evening),-->12/4 morning Hg 7.9 -> 7.4; , plt 129, INR 1.26, K 3.1 (repleted to 4.2), t bili 2.6, , AST 86, ALT 45, lactate 3-->1.7,  Patient given: benadryl 25 IVP, 2g IV mag, PO/IV K, librium 25, ativan 1mg, 4 IV zofran, IV protonix 80, 3L NS  EKG: sinus tach to 111, prolonged     VITAL SIGNS:  ICU Vital Signs Last 24 Hrs  T(C): 36.8 (04 Dec 2019 17:29), Max: 37.3 (04 Dec 2019 06:06)  T(F): 98.3 (04 Dec 2019 17:29), Max: 99.1 (04 Dec 2019 06:06)  HR: 99 (04 Dec 2019 17:29) (75 - 125)  BP: 112/76 (04 Dec 2019 17:29) (89/60 - 120/83)  BP(mean): --  ABP: --  ABP(mean): --  RR: 18 (04 Dec 2019 17:29) (16 - 20)  SpO2: 96% (04 Dec 2019 17:29) (96% - 100%)    CAPILLARY BLOOD GLUCOSE          PHYSICAL EXAM:  Constitutional: resting comfortably in bed, NAD  HEENT: NC/AT; PERRL, anicteric sclera; no oropharyngeal erythema or exudates; MMM  Neck: supple, no appreciable JVD  Respiratory: CTA B/L, no W/R/R; respirations appear non-labored, conversive in full sentences  Cardiovascular: +S1/S2, RRR  Gastrointestinal: abdomen soft, NT/ND  Extremities: WWP; no clubbing, cyanosis or edema  Vascular: 2+ radial, femoral, and DP/PT pulses B/L  Dermatologic: skin normal color and turgor; no visible rashes  Neurological:     LABS:                        7.1    3.70  )-----------( 76       ( 04 Dec 2019 17:05 )             22.1     12-04    141  |  106  |  17  ----------------------------<  90  4.2   |  28  |  0.59    Ca    8.4<L>      04 Dec 2019 05:07  Mg     1.5     12-03    TPro  5.7<L>  /  Alb  2.7<L>  /  TBili  2.3<H>  /  DBili  x   /  AST  59<H>  /  ALT  32  /  AlkPhos  157<H>  12-04    PT/INR - ( 03 Dec 2019 21:21 )   PT: 14.0 sec;   INR: 1.26          PTT - ( 03 Dec 2019 21:21 )  PTT:28.9 sec  Lactate, Blood: 1.7 mmoL/L (12-03-19 @ 22:42)  Lactate, Blood: 3.0 mmoL/L (12-03-19 @ 21:43)              EKG: Reviewed.    RADIOLOGY & ADDITIONAL TESTS: Reviewed. --INCOMPLETE---     [facility] SERVICE CONSULTATION NOTE  59YOF with PMH of possible alcohol abuse (per chart, PCP, patient denies), lyme disease, pyelonephritis who presented to [facility] on 12/3 c/o 2 days of hematemesis/hematochezia. Monday patient endorsed poor appetite with subsequent multiple episodes of hematemesis bright red (around a drinking glass volume) with sharp abd pain. Tuesday patient continued with these episodes along with loose stool w/ BRBPR. She endorsed this was a first time occurrence. She also mentioned she had binge drank prior champagne the weekend prior and at baseline has a heavy drinking habit (2-4 drinks to a bottle of liquor almost daily). She denied NSAID use and endoscopy/colonoscopy 2.5 years ago revealing early gastric ulcer, for which patient was started on an oral medication (doesn't know name). Pt at bedside at 3pm denied any CP, SOB, pain w/ defecation/straining, and no longer experiencing abdominal pain. Patient no longer having anymore active hematemesis or hematochezia episodes since presentation. At [facility], patient initially tachycardic to 120s and with BP /60s, with labs illustrating a Hb11.1 along with a Lactate of 3, therefore 3L NS were given, along with PPI 80IVP and Zofran 4IVP. Patient likely dry and s/p hemodilution of Hb, with AM Hb illustrating drop to 7.9. Patient then transferred to [facility] with plan for emergent scope in setting of drop in Hb. In setting of concern for variceal bleed with labs illustrating likely cirrhosis (thrombocytopenia, elevated INR, low Alb), ICU was consulted for likely need of tele monitoring post EGD.     ED vitals: afeb, --> 90, BP /60s, R18 100% RA, orthostatic neg  Labs s/f: Hb 11.1 on admission (12/3 evening),-->12/4 morning Hg 7.9 -> 7.4; , plt 129, INR 1.26, K 3.1 (repleted to 4.2), t bili 2.6, , AST 86, ALT 45, lactate 3-->1.7,  Patient given: benadryl 25 IVP, 2g IV mag, PO/IV K, librium 25, ativan 1mg, 4 IV zofran, IV protonix 80, 3L NS  EKG: sinus tach to 111, prolonged     VITAL SIGNS:  ICU Vital Signs Last 24 Hrs  T(C): 36.8 (04 Dec 2019 17:29), Max: 37.3 (04 Dec 2019 06:06)  T(F): 98.3 (04 Dec 2019 17:29), Max: 99.1 (04 Dec 2019 06:06)  HR: 99 (04 Dec 2019 17:29) (75 - 125)  BP: 112/76 (04 Dec 2019 17:29) (89/60 - 120/83)  BP(mean): --  ABP: --  ABP(mean): --  RR: 18 (04 Dec 2019 17:29) (16 - 20)  SpO2: 96% (04 Dec 2019 17:29) (96% - 100%)    CAPILLARY BLOOD GLUCOSE      PHYSICAL EXAM:  Constitutional: resting comfortably in bed, NAD  HEENT: NC/AT; PERRL, anicteric sclera; no oropharyngeal erythema or exudates; MMM  Neck: supple, no appreciable JVD  Respiratory: CTA B/L, no W/R/R; respirations appear non-labored, conversive in full sentences  Cardiovascular: +S1/S2, RRR  Gastrointestinal: abdomen soft, NT/ND  Extremities: WWP; no clubbing, cyanosis or edema  Vascular: 2+ radial, femoral, and DP/PT pulses B/L  Dermatologic: skin normal color and turgor; no visible rashes  Neurological:     LABS:                        7.1    3.70  )-----------( 76       ( 04 Dec 2019 17:05 )             22.1     12-04    141  |  106  |  17  ----------------------------<  90  4.2   |  28  |  0.59    Ca    8.4<L>      04 Dec 2019 05:07  Mg     1.5     12-03    TPro  5.7<L>  /  Alb  2.7<L>  /  TBili  2.3<H>  /  DBili  x   /  AST  59<H>  /  ALT  32  /  AlkPhos  157<H>  12-04    PT/INR - ( 03 Dec 2019 21:21 )   PT: 14.0 sec;   INR: 1.26          PTT - ( 03 Dec 2019 21:21 )  PTT:28.9 sec  Lactate, Blood: 1.7 mmoL/L (12-03-19 @ 22:42)  Lactate, Blood: 3.0 mmoL/L (12-03-19 @ 21:43)              EKG: Reviewed.    RADIOLOGY & ADDITIONAL TESTS: Reviewed. --INCOMPLETE---     [facility] SERVICE CONSULTATION NOTE  59YOF with PMH of Alcohol abuse, Lyme disease, Pyelonephritis who presented to [facility] on 12/3 c/o 1 days of hematemesis/hematochezia. Monday patient endorsed poor appetite with subsequent multiple episodes of hematemesis bright red (around a drinking glass volume) with associated sharp abd pain in the evening. Tuesday patient continued with these episodes along with loose stool w/ BRBPR. She endorsed this was a first time occurrence. She also mentioned she had binge drank prior champagne the weekend prior and at baseline has a heavy drinking habit (2-4 drinks to a bottle of liquor almost daily). She denied NSAID use or anticoagulants, and endoscopy/colonoscopy 2.5 years ago revealing early gastric ulcer, for which patient was started on an oral medication (doesn't know name). Pt at bedside at 3pm denied any CP, SOB, pain w/ defecation/straining, and no longer experiencing abdominal pain. Patient no longer having anymore active hematemesis or hematochezia episodes since presentation. At [facility], patient initially tachycardic to 120s and with BP /60s, with labs illustrating a Hb11.1 along with a Lactate of 3, therefore 3L NS were given, along with PPI 80IVP and Zofran 4IVP. Patient likely dry and s/p hemodilution of Hb, with AM Hb illustrating drop to 7.9. Patient then transferred to [facility] with plan for emergent scope in setting of drop in Hb. In setting of concern for variceal bleed with labs illustrating likely cirrhosis (thrombocytopenia, elevated INR, low Alb), ICU was consulted for likely need of tele monitoring post EGD.     ED vitals: afeb, --> 90, BP /60s, R18 100% RA, orthostatic neg  Labs s/f: Hb 11.1 on admission (12/3 evening),-->12/4 morning Hg 7.9 -> 7.4; , plt 129, INR 1.26, K 3.1 (repleted to 4.2), t bili 2.6, , AST 86, ALT 45, lactate 3-->1.7,  Patient given: benadryl 25 IVP, 2g IV mag, PO/IV K, librium 25, ativan 1mg, 4 IV zofran, IV protonix 80, 3L NS  EKG: sinus tach to 111, prolonged     VITAL SIGNS:  ICU Vital Signs Last 24 Hrs  T(C): 36.8 (04 Dec 2019 17:29), Max: 37.3 (04 Dec 2019 06:06)  T(F): 98.3 (04 Dec 2019 17:29), Max: 99.1 (04 Dec 2019 06:06)  HR: 99 (04 Dec 2019 17:29) (75 - 125)  BP: 112/76 (04 Dec 2019 17:29) (89/60 - 120/83)  BP(mean): --  ABP: --  ABP(mean): --  RR: 18 (04 Dec 2019 17:29) (16 - 20)  SpO2: 96% (04 Dec 2019 17:29) (96% - 100%)    CAPILLARY BLOOD GLUCOSE      PHYSICAL EXAM:  Constitutional: resting comfortably in bed, NAD  HEENT: NC/AT; PERRL, anicteric sclera; no oropharyngeal erythema or exudates; MMM  Neck: supple, no appreciable JVD  Respiratory: CTA B/L, no W/R/R; respirations appear non-labored, conversive in full sentences  Cardiovascular: +S1/S2, RRR  Gastrointestinal: abdomen soft, NT/ND  Extremities: WWP; no clubbing, cyanosis or edema  Vascular: 2+ radial, femoral, and DP/PT pulses B/L  Dermatologic: skin normal color and turgor; no visible rashes  Neurological:     LABS:                        7.1    3.70  )-----------( 76       ( 04 Dec 2019 17:05 )             22.1     12-04    141  |  106  |  17  ----------------------------<  90  4.2   |  28  |  0.59    Ca    8.4<L>      04 Dec 2019 05:07  Mg     1.5     12-03    TPro  5.7<L>  /  Alb  2.7<L>  /  TBili  2.3<H>  /  DBili  x   /  AST  59<H>  /  ALT  32  /  AlkPhos  157<H>  12-04    PT/INR - ( 03 Dec 2019 21:21 )   PT: 14.0 sec;   INR: 1.26          PTT - ( 03 Dec 2019 21:21 )  PTT:28.9 sec  Lactate, Blood: 1.7 mmoL/L (12-03-19 @ 22:42)  Lactate, Blood: 3.0 mmoL/L (12-03-19 @ 21:43)              EKG: Reviewed.    RADIOLOGY & ADDITIONAL TESTS: Reviewed. [facility] SERVICE CONSULTATION NOTE  59YOF with PMH of Alcohol abuse, Lyme disease, Pyelonephritis who presented to [facility] on 12/3 c/o 1 days of hematemesis/hematochezia. Monday patient endorsed poor appetite with subsequent multiple episodes of hematemesis bright red (around a drinking glass volume) with associated sharp abd pain in the evening. Tuesday patient continued with these episodes along with loose stool w/ BRBPR. She endorsed this was a first time occurrence. She also mentioned she had binge drank prior champagne the weekend prior and at baseline has a heavy drinking habit (2-4 drinks to a bottle of liquor almost daily). She denied NSAID use or anticoagulants, and endoscopy/colonoscopy 2.5 years ago revealing early gastric ulcer, for which patient was started on an oral medication (doesn't know name). Pt at bedside at 3pm denied any CP, SOB, pain w/ defecation/straining, and no longer experiencing abdominal pain. Patient no longer having anymore active hematemesis or hematochezia episodes since presentation. At [facility], patient initially tachycardic to 120s and with BP /60s, with labs illustrating a Hb11.1 along with a Lactate of 3, therefore 3L NS were given, along with PPI 80IVP and Zofran 4IVP. Patient likely dry and s/p hemodilution of Hb, with AM Hb illustrating drop to 7.9. Patient then transferred to [facility] with plan for emergent scope in setting of drop in Hb. In setting of concern for variceal bleed with labs illustrating likely cirrhosis (thrombocytopenia, elevated INR, low Alb), ICU was consulted for likely need of tele monitoring post EGD.     ED vitals: afeb, --> 90, BP /60s, R18 100% RA, orthostatic neg  Labs s/f: Hb 11.1 on admission (12/3 evening),-->12/4 morning Hg 7.9 -> 7.4; , plt 129, INR 1.26, K 3.1 (repleted to 4.2), t bili 2.6, , AST 86, ALT 45, lactate 3-->1.7,  Patient given: benadryl 25 IVP, 2g IV mag, PO/IV K, librium 25, ativan 1mg, 4 IV zofran, IV protonix 80, 3L NS  EKG: sinus tach to 111, prolonged     VITAL SIGNS:  ICU Vital Signs Last 24 Hrs  T(C): 36.8 (04 Dec 2019 17:29), Max: 37.3 (04 Dec 2019 06:06)  T(F): 98.3 (04 Dec 2019 17:29), Max: 99.1 (04 Dec 2019 06:06)  HR: 99 (04 Dec 2019 17:29) (75 - 125)  BP: 112/76 (04 Dec 2019 17:29) (89/60 - 120/83)  BP(mean): --  ABP: --  ABP(mean): --  RR: 18 (04 Dec 2019 17:29) (16 - 20)  SpO2: 96% (04 Dec 2019 17:29) (96% - 100%)    CAPILLARY BLOOD GLUCOSE    PHYSICAL EXAM:  Constitutional:  female in NAD  HEENT: NC/AT; conj pallor, Mucus memb dry   Neck: supple  Respiratory: CTA B/L on RA   Cardiovascular: +S1/S2, RRR no murmurs appreciated   Gastrointestinal: abdomen soft, NT/ND  Extremities: WWP; no edema  Vascular: 2+ DP pulses B/L  Dermatologic: skin normal   Neurological: AAOx3     LABS:                        7.1    3.70  )-----------( 76       ( 04 Dec 2019 17:05 )             22.1     12-04    141  |  106  |  17  ----------------------------<  90  4.2   |  28  |  0.59    Ca    8.4<L>      04 Dec 2019 05:07  Mg     1.5     12-03    TPro  5.7<L>  /  Alb  2.7<L>  /  TBili  2.3<H>  /  DBili  x   /  AST  59<H>  /  ALT  32  /  AlkPhos  157<H>  12-04    PT/INR - ( 03 Dec 2019 21:21 )   PT: 14.0 sec;   INR: 1.26          PTT - ( 03 Dec 2019 21:21 )  PTT:28.9 sec  Lactate, Blood: 1.7 mmoL/L (12-03-19 @ 22:42)  Lactate, Blood: 3.0 mmoL/L (12-03-19 @ 21:43)              EKG: Reviewed.    RADIOLOGY & ADDITIONAL TESTS: Reviewed.

## 2019-12-04 NOTE — H&P ADULT - ASSESSMENT
59 year old female pmh moderate to heavy alcohol use, gastric ulcer/gastritis who presented with 2 days of hematochezia/hematemesis likely alcohol induced, less likely stress induced admitted for GI workup.

## 2019-12-04 NOTE — H&P ADULT - NSHPLABSRESULTS_GEN_ALL_CORE
LABS:                         7.4    4.25  )-----------( 95       ( 04 Dec 2019 10:21 )             21.3     12-04    141  |  106  |  17  ----------------------------<  90  4.2   |  28  |  0.59    Ca    8.4<L>      04 Dec 2019 05:07  Mg     1.5     12-03    TPro  5.7<L>  /  Alb  2.7<L>  /  TBili  2.3<H>  /  DBili  x   /  AST  59<H>  /  ALT  32  /  AlkPhos  157<H>  12-04    PT/INR - ( 03 Dec 2019 21:21 )   PT: 14.0 sec;   INR: 1.26          PTT - ( 03 Dec 2019 21:21 )  PTT:28.9 sec              RADIOLOGY, EKG & ADDITIONAL TESTS: Reviewed.

## 2019-12-04 NOTE — ED ADULT NURSE REASSESSMENT NOTE - NS ED NURSE REASSESS COMMENT FT1
Pt vernon handed over to myself, introduced self to patient, pt very unhappy aggressive with me saying she has a headache and she has been ignored, spoke with Dr Bryan about potential alcohol withdrawal, given bendryl and reglan for headache pt immiedately much calmer,  pt gcs 15 , no further haematemesis and malaena at present, for bloods in 1 hour pt aware
Pt's bp dropped to 90/58. Dr joyce aware. To give n/s as a bolus. Cbc drawn and sent.
attempted to give report to 4 uris. Nurse unavailable at this time. Charge nurse aware. to call again in 10 mins. EMs left with pt.
Pt taken over from CHUNG Buck. Pt on continuous cardiac monitoring. Denies any pain at the moment. Awaiting bed for GI bleed. To repeat CBC at 10am.

## 2019-12-04 NOTE — H&P ADULT - PROBLEM SELECTOR PLAN 1
significant amount Monday to Tuesday, now resolved  likely 2/2 bleeding varices versus ulcer  GI consulted, likely scope tomorrow  clears, NPO after midnight  IV PPI BID

## 2019-12-04 NOTE — H&P ADULT - PROBLEM SELECTOR PLAN 5
129k in Feb 2019, 129k on admission  most recent 95k  in setting of coagulopathy, elevated LFTs likely 2/2 alcohol use  monitor CBC

## 2019-12-04 NOTE — H&P ADULT - PROBLEM SELECTOR PLAN 8
F: NS at 125  N: clears, NPO at midnight  DVT: hold given GIB  PPI: protonix 40 IV BID  DISPO RMF  FULL CODE

## 2019-12-04 NOTE — H&P ADULT - PROBLEM SELECTOR PLAN 4
baseline about 11-12, presented with 11.1, dropped to 7.9 then 7.4 overnight  repeat CBC  likely multifactorial - GIB but should be microcytic so probably influenced by alcohol intake as well  -active type and screen  -monitor HD closely  -scope tomorrow  -B12, folate  -tranfuse Hg <7 or symptomatic

## 2019-12-04 NOTE — CONSULT NOTE ADULT - SUBJECTIVE AND OBJECTIVE BOX
HPI:  60 y/o F PMH suspected alcohol abuse presented to [facility] with 2 days of hematemesis/hematochezia.  Patient reports recent holiday party Saturday with multiple glass of champagne.  Initially reports 5 glass but later reports multiple.  Patient states she ate very little on Monday due to poor appetite with abdominal pain before starting vomiting blood in the evening around 3AM around 7 times.  The afternoon of following day, she noted hematochezia.  As symptoms persisted, she decided to go to the ED.  She denies lightheadedness, f/c, cp, sob, dysuria.  Denies chronic alcohol use.      Denies NSAIDS or AC use.  Reports prior colonoscopy and EGD for nausea and reported no significant finding but reported on H&P to have early gastric ulcer.  Unknown family hx as patient is adopted.      Allergies    Cipro (Hives; Urticaria)    Intolerances    Home Medications:    MEDICATIONS:  MEDICATIONS  (STANDING):  cefTRIAXone   IVPB      cefTRIAXone   IVPB 1000 milliGRAM(s) IV Intermittent once  octreotide  Infusion 50 MICROgram(s)/Hr (10 mL/Hr) IV Continuous <Continuous>  octreotide  Injectable 50 MICROGram(s) IV Push once  pantoprazole Infusion 8 mG/Hr (10 mL/Hr) IV Continuous <Continuous>  sodium chloride 0.9%. 1000 milliLiter(s) (125 mL/Hr) IV Continuous <Continuous>    MEDICATIONS  (PRN):    PAST MEDICAL & SURGICAL HISTORY:  Pyelonephritis  Gastritis  Lyme disease  No significant past surgical history    FAMILY HISTORY:  adopted, unknown    SOCIAL HISTORY:  Tobacco: denies  Alcohol: reports social alcohol use  Illicit Drugs:denies    REVIEW OF SYSTEMS:  All other 10 review of systems is negative except as indicated in HPI    Vital Signs Last 24 Hrs  T(C): 36.8 (04 Dec 2019 17:29), Max: 37.3 (04 Dec 2019 06:06)  T(F): 98.3 (04 Dec 2019 17:29), Max: 99.1 (04 Dec 2019 06:06)  HR: 99 (04 Dec 2019 17:29) (75 - 125)  BP: 112/76 (04 Dec 2019 17:29) (89/60 - 120/83)  BP(mean): --  RR: 18 (04 Dec 2019 17:29) (16 - 20)  SpO2: 96% (04 Dec 2019 17:29) (96% - 100%)      PHYSICAL EXAM:    General: Well developed; well nourished; in no acute distress  Eyes: moist conjunctivae, sclerae anicteric  HENT: Moist mucous membranes, tongue fasciculation  Neck: Trachea midline, supple  Lungs: Normal respiratory effort and no intercostal retractions  Cardiovascular: RRR, S1S2  Abdomen: Soft, non-tender, mildly distended Normal bowel sounds; No rebound or guarding  Rectal exam: External hemorrhoid, melenic stool in rectal vault  Extremities: Normal range of motion, No pitting edema  Neurological: Alert and oriented x3, tremor  Skin: Warm and dry    LABS:                        7.1    3.70  )-----------( 76       ( 04 Dec 2019 17:05 )             22.1     12-04    141  |  106  |  17  ----------------------------<  90  4.2   |  28  |  0.59    Ca    8.4<L>      04 Dec 2019 05:07  Mg     1.5     12-03    TPro  5.7<L>  /  Alb  2.7<L>  /  TBili  2.3<H>  /  DBili  x   /  AST  59<H>  /  ALT  32  /  AlkPhos  157<H>  12-04        PT/INR - ( 03 Dec 2019 21:21 )   PT: 14.0 sec;   INR: 1.26          PTT - ( 03 Dec 2019 21:21 )  PTT:28.9 sec

## 2019-12-04 NOTE — PATIENT PROFILE ADULT - VISION (WITH CORRECTIVE LENSES IF THE PATIENT USUALLY WEARS THEM):
Partially impaired: cannot see medication labels or newsprint, but can see obstacles in path, and the surrounding layout; can count fingers at arm's length/wears contact

## 2019-12-04 NOTE — H&P ADULT - NSHPSOCIALHISTORY_GEN_ALL_CORE
, lives alone, no kids. Strong social support from friends. Denies tobacco use, drug use. Heavy alcohol use after graduate school, reports she now drinks a few times a month, 2-4 drinks. Works as fine arts , [employer]. Former professor.

## 2019-12-04 NOTE — H&P ADULT - HISTORY OF PRESENT ILLNESS
60 y/o F [facility] ______ 58 y/o F PMH possible alcohol abuse (per chart, PCP, patient denies), lyme disease, pyelonephritis who presented to [facility] with complaint of 2 days of hematemesis/hematochezia. Patient states she ate very little on Monday due to poor appetite and starting vomiting blood Monday night, multiple episodes, preceded by sharp abdominal pain, states amount would fill a drinking glass). Tuesday afternoon the same thing happened, as well as the onset of thin diarrhea with bright red blood. Patient states this has never happened before. She did drink a lot of champagne over the weekend otherwise reports she drinks only a few days per month 2-4 drinks per occasion, although hints that she thinks she drinks too much. Recently did an elective 10 day abstinence from alcohol and had no withdrawal symptoms. Denies NSAID use. Reports endoscopy/colonoscopy 2.5 years ago revealing early gastric ulcer, for which patient was started on an oral medication (doesn't know name). Patient states she has been under significant stress recently including death of her mother, severe illness in a close friend, fighting with sister over will. Possibly some fever/chills at home, otherwise no chest pain, SOB, dysuria, hematuria, pain with defecation, straining. Currently she feels like her belly is swollen but no pain, nausea at the moment.     ED vitals t 98.7, p 125, /65, RR 18, 98% on RA, orthostatics negative  ED labs Hg 11.1 on admission (12/3 evening), 12/4 morning Hg 7.9 -> 7.4; , plt 129, INR 1.26, K 3.1 (repleted to 4.2), t bili 2.6, , AST 86, ALT 45, lactate 3, mag 2.5  s/p IV benadryl, IV mag, PO/IV K, librium 25, ativan 1mg, IV zofran, IV protonix 80, 3L NS  EKG: sinus tach to 111, prolonged

## 2019-12-04 NOTE — H&P ADULT - PROBLEM SELECTOR PLAN 6
downtrending since admission  given 2:1 AST:ALT ratio, supportive of alcohol use  elevated ALP likely 2/2 hypovolemia unlikely cholestasis given lack of symptoms  monitor CMP

## 2019-12-04 NOTE — CHART NOTE - NSCHARTNOTEFT_GEN_A_CORE
Discussed patient with GI fellow and attending Dr. Shrestha. Although patient currently HD stable and mentating, in setting of significant Hg drop, soft BPs, elevated LFTs patient will be urgently transported to endoscopy suite for upper endoscopy. ICU consult called for eval and placement post-endoscopy. Likely will need tele monitoring. Patient will stay NPO for now. Plan discussed with hospitalist Dr. Gould.    Dr. Garcias, PGY1

## 2019-12-04 NOTE — H&P ADULT - PROBLEM SELECTOR PLAN 9
[address] ED      CHIEF COMPLAINT  Altered Mental Status (Sent by ECF for c/o AMS. Patient states he was DC yesterday and has no complaints, would like to go home. )       HISTORY OF PRESENT ILLNESS  Bruno Moreno is a 79 y.o. male  who presents to the ED complaining of confusion. Patient was sent to the nursing home yesterday after he was discharged from the hospital with pneumonia. He reportedly arrived to the ER and slept through the night and this morning was noted to not be talking to them and seemed to have altered mental status so sent here for further evaluation. Per EMS, patient was sitting up and talking when they arrived and is a slightly confused but otherwise has been talking normally. A stat blood sugar checked en route was 151. Patient denies any complaints. Patient is on 3L O2 at the nursing home. No other complaints, modifying factors or associated symptoms. I have reviewed the following from the nursing documentation. Past Medical History:   Diagnosis Date    AAA (abdominal aortic aneurysm) (HCC)     Acute constipation     Acute gastrointestinal hemorrhage     Bladder CA in situ     BPH (benign prostatic hyperplasia)     Constipation     COPD (chronic obstructive pulmonary disease) (HCC)     Coronary arteriosclerosis     Current mild episode of major depressive disorder (HCC)     Depression     Diabetes mellitus ([address]     Diabetic neuropathy ([facility])     Diverticulitis of intestine w/o perforation or abscess with bleeding     GERD (gastroesophageal reflux disease)     GI hemorrhage     Hypercholesteremia     Hypertension     Hypoaldosteronism ([address]     Pacemaker     Postsurgical aortocoronary bypass status     Pure hypercholesterolemia     S/P cataract surgery     Ulcer of left foot ([address]     Vitamin D deficiency      Past Surgical History:   Procedure Laterality Date    EYE SURGERY      PACEMAKER INSERTION       History reviewed.  No pertinent family history. Social History     Socioeconomic History    Marital status:      Spouse name: Not on file    Number of children: Not on file    Years of education: Not on file    Highest education level: Not on file   Occupational History    Not on file   Social Needs    Financial resource strain: Not on file    Food insecurity:     Worry: Not on file     Inability: Not on file    Transportation needs:     Medical: Not on file     Non-medical: Not on file   Tobacco Use    Smoking status: Former Smoker    Smokeless tobacco: Never Used    Tobacco comment: prior to 2010   Substance and Sexual Activity    Alcohol use: No    Drug use: No    Sexual activity: Not Currently   Lifestyle    Physical activity:     Days per week: Not on file     Minutes per session: Not on file    Stress: Not on file   Relationships    Social connections:     Talks on phone: Not on file     Gets together: Not on file     Attends Gnosticist service: Not on file     Active member of club or organization: Not on file     Attends meetings of clubs or organizations: Not on file     Relationship status: Not on file    Intimate partner violence:     Fear of current or ex partner: Not on file     Emotionally abused: Not on file     Physically abused: Not on file     Forced sexual activity: Not on file   Other Topics Concern    Not on file   Social History Narrative    Not on file     No current facility-administered medications for this encounter. Current Outpatient Medications   Medication Sig Dispense Refill    predniSONE (DELTASONE) 20 MG tablet 1 qd- 3 days, 1/2 qd- 3 days 5 tablet 0    levofloxacin (LEVAQUIN) 500 MG tablet Take 1 tablet by mouth daily for 4 days 4 tablet 0    vitamin D (CHOLECALCIFEROL) 5000 units CAPS capsule Take 10,000 Units by mouth once a week Weekly on Mondays.       DULoxetine (CYMBALTA) 30 MG extended release capsule Take 30 mg by mouth daily      ferrous sulfate 325 (65 Fe) MG tablet Take 325 mg by mouth daily (with breakfast)      gabapentin (NEURONTIN) 100 MG capsule Take 100 mg by mouth daily. 300mg qhs      insulin glargine (LANTUS) 100 UNIT/ML injection vial Inject 3 Units into the skin nightly      saxagliptin (ONGLYZA) 5 MG TABS tablet Take 5 mg by mouth daily      polyethylene glycol (GLYCOLAX) packet Take 17 g by mouth Every Monday and Friday      cilostazol (PLETAL) 100 MG tablet Take 100 mg by mouth 2 times daily      insulin lispro (HUMALOG) 100 UNIT/ML injection vial Inject into the skin 3 times daily (before meals)      budesonide-formoterol (SYMBICORT) 80-4.5 MCG/ACT AERO Inhale 2 puffs into the lungs 2 times daily      gabapentin (NEURONTIN) 300 MG capsule Take 300 mg by mouth nightly.  glipiZIDE (GLUCOTROL) 5 MG tablet Take 5 mg by mouth Daily with supper      glipiZIDE (GLUCOTROL) 10 MG tablet glipizide 10 mg tablet      nitroGLYCERIN (NITROSTAT) 0.4 MG SL tablet nitroglycerin 0.4 mg sublingual tablet      metoprolol succinate (TOPROL XL) 25 MG extended release tablet Toprol XL 25 mg tablet,extended release   Take 1 tablet every day by oral route.  atorvastatin (LIPITOR) 40 MG tablet atorvastatin 40 mg tablet      aspirin 81 MG chewable tablet aspirin 81 mg chewable tablet   Chew 1 tablet every day by oral route.  omeprazole (PRILOSEC) 40 MG delayed release capsule omeprazole 40 mg capsule,delayed release      ondansetron (ZOFRAN) 4 MG tablet ondansetron HCl 4 mg tablet      finasteride (PROSCAR) 5 MG tablet finasteride 5 mg tablet      albuterol (PROVENTIL) (2.5 MG/3ML) 0.083% nebulizer solution albuterol sulfate 2.5 mg/3 mL (0.083 %) solution for nebulization       Allergies   Allergen Reactions    No Known Allergies        REVIEW OF SYSTEMS  10 systems reviewed, pertinent positives per HPI otherwise noted to be negative.     PHYSICAL EXAM  BP (!) 158/71   Pulse 76   Temp 97.3 °F (36.3 °C) (Axillary)   Resp 15   Wt 180 lb (81.6 kg)   SpO2 rhythm with a rate of 73  Axis is   Left axis deviation  QTc is  553ms  Intervals and Durations are unremarkable. ST Segments: nonspecific changes  No significant change from prior EKG dated 4/10/19    RADIOLOGY  Xr Chest Standard (2 Vw)    Result Date: 4/16/2019  EXAMINATION: TWO VIEWS OF THE CHEST 4/16/2019 10:11 am COMPARISON: Portable chest 04/10/2019. HISTORY: ORDERING SYSTEM PROVIDED HISTORY: pneumonia TECHNOLOGIST PROVIDED HISTORY: Reason for exam:->pneumonia Ordering Physician Provided Reason for Exam: PNA Acuity: Acute Type of Exam: Initial FINDINGS: Biventricular pacemaker leads appear unchanged in position. Heart size grossly unchanged. Lung aeration substantially improved, particularly on the right. Persistent small, possibly partially loculated pleural effusions. There is residual patchy opacity throughout the right lung. A small nodular density projects over the right anterior 2nd-3rd rib interface. Substantially improved lung aeration. Small possibly partially loculated pleural effusions remain in addition to patchy airspace disease in the right lung. Small nodular opacity in the right upper lobe is indeterminate. RECOMMENDATION: Radiographic follow-up after treatment and symptom resolution is recommended. Ct Head Wo Contrast    Result Date: 4/17/2019  EXAMINATION: CT OF THE HEAD WITHOUT CONTRAST  4/17/2019 11:44 am TECHNIQUE: CT of the head was performed without the administration of intravenous contrast. Dose modulation, iterative reconstruction, and/or weight based adjustment of the mA/kV was utilized to reduce the radiation dose to as low as reasonably achievable. COMPARISON: None. HISTORY: ORDERING SYSTEM PROVIDED HISTORY: confusion TECHNOLOGIST PROVIDED HISTORY: Has a \"code stroke\" or \"stroke alert\" been called? ->No Ordering Physician Provided Reason for Exam: confused Acuity: Acute Type of Exam: Initial FINDINGS: BRAIN/VENTRICLES: There is no acute intracranial hemorrhage, pleural effusions. Us Chest Including Mediastinum    Result Date: 4/16/2019  EXAMINATION: ULTRASOUND OF THE CHEST 4/16/2019 1:53 pm COMPARISON: None. HISTORY: ORDERING SYSTEM PROVIDED HISTORY: Pleural effusion TECHNOLOGIST PROVIDED HISTORY: Right-sided thoracentesis and take up to 1.5 liter. Reason for exam:->Pleural effusion FINDINGS: Ultrasonographic examination of both hemithoraces was performed to evaluate for presence of pleural effusion which would require thoracentesis. No significant pleural fluid is identified within either hemithorax. With this in mind, no thoracentesis was performed. No thoracentesis was performed due to lack of significant pleural effusions within the hemithoraces, as described above. ED COURSE/MDM  Patient seen and evaluated. Old records reviewed. Labs and imaging reviewed and results discussed with patient. Patient presenting for evaluation of reported altered mental status. He is conversing with me normally and actually joking around. I do not see any focal neurologic deficits. Chest x-ray still with opacities as well as small pleural effusions. He is being treated for pneumonia. Normal white blood cell count. No evidence of urinary infection. CT head unremarkable. We'll discharge back to the nursing facility at this time. He is currently on 2-3 L of oxygen via nasal cannula and is conversing appropriately. Daughter came to the bedside as well who is the POA and was in agreement. All questions answered at time of discharge. I estimate there is LOW risk for ACUTE CORONARY SYNDROME, INTRACRANIAL HEMORRHAGE, MALIGNANT DYSRHYTHMIA, MENINGITIS, PNEUMONIA, PULMONARY EMBOLISM, SEPSIS, SUBARACHNOID HEMORRHAGE, SUBDURAL HEMATOMA, STROKE, or URINARY TRACT INFECTION, thus I consider the discharge disposition reasonable. Melva Jimenez and I have discussed the diagnosis and risks, and we agree with discharging home to follow-up with their primary doctor.  We also discussed returning to the Emergency Department immediately if new or worsening symptoms occur. We have discussed the symptoms which are most concerning (e.g., changing or worsening pain, weakness, vomiting, fever) that necessitate immediate return. During the patient's ED course, the patient was given:  Medications - No data to display     CLINICAL IMPRESSION  1. Altered mental status, unspecified altered mental status type    2. Pleural effusion    3. Respiratory failure with hypoxia, unspecified chronicity ([address]    4. Pneumonia due to organism        Blood pressure (!) 158/71, pulse 76, temperature 97.3 °F (36.3 °C), temperature source Axillary, resp. rate 15, weight 180 lb (81.6 kg), SpO2 99 %. DISPOSITION  Talia Caro was discharged to home (nursing home) in stable condition. Patient was given scripts for the following medications. I counseled patient how to take these medications. New Prescriptions    No medications on file       Follow-up with:  [facility]  [address] [facility] [address] [facility]    Schedule an appointment as soon as possible for a visit in 1 day  For recheck      DISCLAIMER: This chart was created using Dragon dictation software. Efforts were made by me to ensure accuracy, however some errors may be present due to limitations of this technology and occasionally words are not transcribed correctly.         Frederick Hood MD  04/17/19 4624 Dr. Munoz contacted and aware -- 12/4

## 2019-12-05 ENCOUNTER — TRANSCRIPTION ENCOUNTER (OUTPATIENT)
Age: 59
End: 2019-12-05

## 2019-12-05 ENCOUNTER — RESULT REVIEW (OUTPATIENT)
Age: 59
End: 2019-12-05

## 2019-12-05 VITALS — WEIGHT: 108.91 LBS

## 2019-12-05 DIAGNOSIS — K92.0 HEMATEMESIS: ICD-10-CM

## 2019-12-05 DIAGNOSIS — Z29.9 ENCOUNTER FOR PROPHYLACTIC MEASURES, UNSPECIFIED: ICD-10-CM

## 2019-12-05 DIAGNOSIS — K70.10 ALCOHOLIC HEPATITIS WITHOUT ASCITES: ICD-10-CM

## 2019-12-05 DIAGNOSIS — D64.9 ANEMIA, UNSPECIFIED: ICD-10-CM

## 2019-12-05 PROBLEM — K29.70 GASTRITIS, UNSPECIFIED, WITHOUT BLEEDING: Chronic | Status: ACTIVE | Noted: 2019-12-03

## 2019-12-05 LAB
A1AT SERPL-MCNC: 123 MG/DL — SIGNIFICANT CHANGE UP (ref 90–200)
ALBUMIN SERPL ELPH-MCNC: 3.6 G/DL — SIGNIFICANT CHANGE UP (ref 3.3–5)
ALP SERPL-CCNC: 157 U/L — HIGH (ref 40–120)
ALT FLD-CCNC: 32 U/L — SIGNIFICANT CHANGE UP (ref 10–45)
ANION GAP SERPL CALC-SCNC: 12 MMOL/L — SIGNIFICANT CHANGE UP (ref 5–17)
AST SERPL-CCNC: 74 U/L — HIGH (ref 10–40)
BASOPHILS # BLD AUTO: 0.01 K/UL — SIGNIFICANT CHANGE UP (ref 0–0.2)
BASOPHILS NFR BLD AUTO: 0.3 % — SIGNIFICANT CHANGE UP (ref 0–2)
BILIRUB SERPL-MCNC: 1.7 MG/DL — HIGH (ref 0.2–1.2)
BUN SERPL-MCNC: 8 MG/DL — SIGNIFICANT CHANGE UP (ref 7–23)
CALCIUM SERPL-MCNC: 8.9 MG/DL — SIGNIFICANT CHANGE UP (ref 8.4–10.5)
CERULOPLASMIN SERPL-MCNC: 20 MG/DL — SIGNIFICANT CHANGE UP (ref 16–45)
CHLORIDE SERPL-SCNC: 109 MMOL/L — HIGH (ref 96–108)
CHOLEST SERPL-MCNC: 186 MG/DL — SIGNIFICANT CHANGE UP (ref 10–199)
CO2 SERPL-SCNC: 23 MMOL/L — SIGNIFICANT CHANGE UP (ref 22–31)
CREAT SERPL-MCNC: 0.52 MG/DL — SIGNIFICANT CHANGE UP (ref 0.5–1.3)
EOSINOPHIL # BLD AUTO: 0 K/UL — SIGNIFICANT CHANGE UP (ref 0–0.5)
EOSINOPHIL NFR BLD AUTO: 0 % — SIGNIFICANT CHANGE UP (ref 0–6)
FERRITIN SERPL-MCNC: 401 NG/ML — HIGH (ref 15–150)
GLUCOSE SERPL-MCNC: 105 MG/DL — HIGH (ref 70–99)
HAV IGM SER-ACNC: SIGNIFICANT CHANGE UP
HBV CORE IGM SER-ACNC: SIGNIFICANT CHANGE UP
HBV SURFACE AG SER-ACNC: SIGNIFICANT CHANGE UP
HCT VFR BLD CALC: 25.1 % — LOW (ref 34.5–45)
HCV AB S/CO SERPL IA: 0.11 S/CO — SIGNIFICANT CHANGE UP
HCV AB SERPL-IMP: SIGNIFICANT CHANGE UP
HDLC SERPL-MCNC: 70 MG/DL — SIGNIFICANT CHANGE UP
HGB BLD-MCNC: 7.9 G/DL — LOW (ref 11.5–15.5)
IMM GRANULOCYTES NFR BLD AUTO: 0.3 % — SIGNIFICANT CHANGE UP (ref 0–1.5)
IRON SATN MFR SERPL: 28 % — SIGNIFICANT CHANGE UP (ref 14–50)
IRON SATN MFR SERPL: 49 UG/DL — SIGNIFICANT CHANGE UP (ref 30–160)
LIPID PNL WITH DIRECT LDL SERPL: 82 MG/DL — SIGNIFICANT CHANGE UP
LYMPHOCYTES # BLD AUTO: 0.81 K/UL — LOW (ref 1–3.3)
LYMPHOCYTES # BLD AUTO: 21.3 % — SIGNIFICANT CHANGE UP (ref 13–44)
MAGNESIUM SERPL-MCNC: 2 MG/DL — SIGNIFICANT CHANGE UP (ref 1.6–2.6)
MCHC RBC-ENTMCNC: 31.5 GM/DL — LOW (ref 32–36)
MCHC RBC-ENTMCNC: 36.9 PG — HIGH (ref 27–34)
MCV RBC AUTO: 117.3 FL — HIGH (ref 80–100)
MONOCYTES # BLD AUTO: 0.37 K/UL — SIGNIFICANT CHANGE UP (ref 0–0.9)
MONOCYTES NFR BLD AUTO: 9.7 % — SIGNIFICANT CHANGE UP (ref 2–14)
NEUTROPHILS # BLD AUTO: 2.6 K/UL — SIGNIFICANT CHANGE UP (ref 1.8–7.4)
NEUTROPHILS NFR BLD AUTO: 68.4 % — SIGNIFICANT CHANGE UP (ref 43–77)
NRBC # BLD: 0 /100 WBCS — SIGNIFICANT CHANGE UP (ref 0–0)
PLATELET # BLD AUTO: 97 K/UL — LOW (ref 150–400)
POTASSIUM SERPL-MCNC: 4.9 MMOL/L — SIGNIFICANT CHANGE UP (ref 3.5–5.3)
POTASSIUM SERPL-SCNC: 4.9 MMOL/L — SIGNIFICANT CHANGE UP (ref 3.5–5.3)
PROT SERPL-MCNC: 5.8 G/DL — LOW (ref 6–8.3)
RBC # BLD: 2.14 M/UL — LOW (ref 3.8–5.2)
RBC # FLD: 13.3 % — SIGNIFICANT CHANGE UP (ref 10.3–14.5)
SODIUM SERPL-SCNC: 144 MMOL/L — SIGNIFICANT CHANGE UP (ref 135–145)
TIBC SERPL-MCNC: 176 UG/DL — LOW (ref 220–430)
TOTAL CHOLESTEROL/HDL RATIO MEASUREMENT: 2.7 RATIO — LOW (ref 3.3–7.1)
TRANSFERRIN SERPL-MCNC: 149 MG/DL — LOW (ref 200–360)
TRIGL SERPL-MCNC: 169 MG/DL — HIGH (ref 10–149)
UIBC SERPL-MCNC: 127 UG/DL — SIGNIFICANT CHANGE UP (ref 110–370)
VIT B12 SERPL-MCNC: 545 PG/ML — SIGNIFICANT CHANGE UP (ref 232–1245)
WBC # BLD: 3.8 K/UL — SIGNIFICANT CHANGE UP (ref 3.8–10.5)
WBC # FLD AUTO: 3.8 K/UL — SIGNIFICANT CHANGE UP (ref 3.8–10.5)

## 2019-12-05 PROCEDURE — 84466 ASSAY OF TRANSFERRIN: CPT

## 2019-12-05 PROCEDURE — 85730 THROMBOPLASTIN TIME PARTIAL: CPT

## 2019-12-05 PROCEDURE — 82784 ASSAY IGA/IGD/IGG/IGM EACH: CPT

## 2019-12-05 PROCEDURE — 82728 ASSAY OF FERRITIN: CPT

## 2019-12-05 PROCEDURE — 76705 ECHO EXAM OF ABDOMEN: CPT

## 2019-12-05 PROCEDURE — 88305 TISSUE EXAM BY PATHOLOGIST: CPT | Mod: 26

## 2019-12-05 PROCEDURE — C9113: CPT

## 2019-12-05 PROCEDURE — 80048 BASIC METABOLIC PNL TOTAL CA: CPT

## 2019-12-05 PROCEDURE — 86376 MICROSOMAL ANTIBODY EACH: CPT

## 2019-12-05 PROCEDURE — 83735 ASSAY OF MAGNESIUM: CPT

## 2019-12-05 PROCEDURE — 99239 HOSP IP/OBS DSCHRG MGMT >30: CPT | Mod: GC

## 2019-12-05 PROCEDURE — 96375 TX/PRO/DX INJ NEW DRUG ADDON: CPT

## 2019-12-05 PROCEDURE — C1889: CPT

## 2019-12-05 PROCEDURE — 96365 THER/PROPH/DIAG IV INF INIT: CPT

## 2019-12-05 PROCEDURE — 82607 VITAMIN B-12: CPT

## 2019-12-05 PROCEDURE — 86900 BLOOD TYPING SEROLOGIC ABO: CPT

## 2019-12-05 PROCEDURE — 96366 THER/PROPH/DIAG IV INF ADDON: CPT

## 2019-12-05 PROCEDURE — 82390 ASSAY OF CERULOPLASMIN: CPT

## 2019-12-05 PROCEDURE — 96368 THER/DIAG CONCURRENT INF: CPT

## 2019-12-05 PROCEDURE — 96361 HYDRATE IV INFUSION ADD-ON: CPT

## 2019-12-05 PROCEDURE — 36415 COLL VENOUS BLD VENIPUNCTURE: CPT

## 2019-12-05 PROCEDURE — 86850 RBC ANTIBODY SCREEN: CPT

## 2019-12-05 PROCEDURE — 86038 ANTINUCLEAR ANTIBODIES: CPT

## 2019-12-05 PROCEDURE — 85027 COMPLETE CBC AUTOMATED: CPT

## 2019-12-05 PROCEDURE — 83690 ASSAY OF LIPASE: CPT

## 2019-12-05 PROCEDURE — 81332 SERPINA1 GENE: CPT

## 2019-12-05 PROCEDURE — 80074 ACUTE HEPATITIS PANEL: CPT

## 2019-12-05 PROCEDURE — 93005 ELECTROCARDIOGRAM TRACING: CPT | Mod: 76

## 2019-12-05 PROCEDURE — 83605 ASSAY OF LACTIC ACID: CPT

## 2019-12-05 PROCEDURE — 76705 ECHO EXAM OF ABDOMEN: CPT | Mod: 26

## 2019-12-05 PROCEDURE — 85025 COMPLETE CBC W/AUTO DIFF WBC: CPT

## 2019-12-05 PROCEDURE — 88305 TISSUE EXAM BY PATHOLOGIST: CPT

## 2019-12-05 PROCEDURE — 83550 IRON BINDING TEST: CPT

## 2019-12-05 PROCEDURE — 86381 MITOCHONDRIAL ANTIBODY EACH: CPT

## 2019-12-05 PROCEDURE — 84100 ASSAY OF PHOSPHORUS: CPT

## 2019-12-05 PROCEDURE — 86901 BLOOD TYPING SEROLOGIC RH(D): CPT

## 2019-12-05 PROCEDURE — 86255 FLUORESCENT ANTIBODY SCREEN: CPT

## 2019-12-05 PROCEDURE — 80061 LIPID PANEL: CPT

## 2019-12-05 PROCEDURE — 83540 ASSAY OF IRON: CPT

## 2019-12-05 PROCEDURE — 85610 PROTHROMBIN TIME: CPT

## 2019-12-05 PROCEDURE — 99291 CRITICAL CARE FIRST HOUR: CPT | Mod: 25

## 2019-12-05 PROCEDURE — 80053 COMPREHEN METABOLIC PANEL: CPT

## 2019-12-05 PROCEDURE — 96367 TX/PROPH/DG ADDL SEQ IV INF: CPT

## 2019-12-05 RX ORDER — PANTOPRAZOLE SODIUM 20 MG/1
1 TABLET, DELAYED RELEASE ORAL
Qty: 60 | Refills: 0
Start: 2019-12-05 | End: 2020-01-03

## 2019-12-05 RX ORDER — PANTOPRAZOLE SODIUM 20 MG/1
40 TABLET, DELAYED RELEASE ORAL ONCE
Refills: 0 | Status: COMPLETED | OUTPATIENT
Start: 2019-12-05 | End: 2019-12-05

## 2019-12-05 RX ORDER — FOLIC ACID 0.8 MG
1 TABLET ORAL
Qty: 30 | Refills: 0
Start: 2019-12-05 | End: 2020-01-03

## 2019-12-05 RX ORDER — AZTREONAM 2 G
1 VIAL (EA) INJECTION
Qty: 3 | Refills: 0
Start: 2019-12-05 | End: 2019-12-07

## 2019-12-05 RX ORDER — THIAMINE MONONITRATE (VIT B1) 100 MG
1 TABLET ORAL
Qty: 30 | Refills: 0
Start: 2019-12-05 | End: 2020-01-03

## 2019-12-05 RX ORDER — MAGNESIUM SULFATE 500 MG/ML
2 VIAL (ML) INJECTION ONCE
Refills: 0 | Status: COMPLETED | OUTPATIENT
Start: 2019-12-05 | End: 2019-12-05

## 2019-12-05 RX ADMIN — PANTOPRAZOLE SODIUM 40 MILLIGRAM(S): 20 TABLET, DELAYED RELEASE ORAL at 07:05

## 2019-12-05 RX ADMIN — Medication 50 GRAM(S): at 00:28

## 2019-12-05 RX ADMIN — Medication 1 TABLET(S): at 17:37

## 2019-12-05 RX ADMIN — Medication 2000 MILLILITER(S): at 05:35

## 2019-12-05 RX ADMIN — SODIUM CHLORIDE 125 MILLILITER(S): 9 INJECTION INTRAMUSCULAR; INTRAVENOUS; SUBCUTANEOUS at 05:35

## 2019-12-05 RX ADMIN — PANTOPRAZOLE SODIUM 40 MILLIGRAM(S): 20 TABLET, DELAYED RELEASE ORAL at 17:37

## 2019-12-05 NOTE — DIETITIAN INITIAL EVALUATION ADULT. - ENERGY NEEDS
Ht: 5'4", Wt: 49.4kg, IBW: 54.5kg, 90.6%IBW.   Needs calculated based on [facility] standards of care.

## 2019-12-05 NOTE — DISCHARGE NOTE PROVIDER - NSDCFUADDAPPT_GEN_ALL_CORE_FT
Please schedule an appointment with Dr. Shrestha, the gastroenterologist who performed your procedure at [facility], for further evaluation and treatment of your condition. Your gastroenterologist, Dr. Shrestha, will reach out to you following your discharge to set up an appointment to see her as an outpatient.

## 2019-12-05 NOTE — DISCHARGE NOTE NURSING/CASE MANAGEMENT/SOCIAL WORK - PATIENT PORTAL LINK FT
You can access the FollowMyHealth Patient Portal offered by [facility] by registering at the following website: [email or web address]. By joining [facility]’s FollowMyHealth portal, you will also be able to view your health information using other applications (apps) compatible with our system.

## 2019-12-05 NOTE — PROGRESS NOTE ADULT - ASSESSMENT
59 year old female with PMHx of alcohol abuse, lyme disease, gastritis, pyelonephritis, who presents with 2 days of hematemsis and hematochezia. Patient states she woke up from sleep Monday night and had 5-6 episodes of hematemesis with bright red blood over the span of 15 minutes. The next day, patient had a similar episode at around 4pm with multipe bouts of hematemesis over the span of 15 minutes. Hematemesis then resolved and she had several episodes of bright red blood per rectum, which also resolved after about 15 minutes. She has not had any episodes of bleeding today. Notes some mild lower back pain and dizziness described as lightheadedness. Denies any LOC, chest pain, sob, fevers. Patient last ate 4 days ago. Denies any NSAID use but admits to drinking 5-6 glasses of champagne 5 days ago.
58 y/o F PMH suspected alcohol abuse presented to [facility] with 2 days of hematemesis/hematochezia    #Hematemesis and Hematochezia  Patient initially presented with reported hematochezia and hematemesis and was found to have stable hemoglobin and orthostatic at [facility].  Over the course of pending transfer to [facility], patient developed withdrawal symptoms and drop in hemoglobin and blood pressure.  At time of transfer, she was also found to have continue downtrend in hemoglobin with rectal exam notable for hemorrhoid and melenic stool.  She was taken for EGD given unclear hx with low platelet and elevated liver biochemistries concerning for cirrhosis.  EGD was unrevealing for acute bleeding but was notable for small hiatal hernia, portal hypertensive gastropathy, duodenitis, and 1-2 cm submucosal gastric lesion.  Colonoscopy 12/5/19 notable for 3 polyps s/p polypectomy and retrieval and diverticulosis, no evidence of bleeding.    -Continue PPI 40 mg BID  -Continue Ceftriaxone 1g qd for 5d in setting of cirrhosis for SBP ppx  -Trend cbc and transfuse for hgb <7 g/dL  -Maintain active T&S and large bore IV  -Advance diet as tolerated  -Outpatient capsule study, please schedule patient for follow-up at the GI Fellow's clinic on the fourth floor of [address] on Monday afternoon with Dr. Gibbs at [phone]    #New Diagnosis of Cirrhosis, compensated likely 2/2 alcohol (-HE, -VH, -Ascites)  -Ascites: no evidence of ascites  -VH: Denies hx of VH and no evidence of EV on EGD  -HE: No evidence of HE  -Avoid hepatotoxin   -Sent out: ceruloplasmin, A1AT, JAZMYN, AMA, ASMA, LKMA, IgG, hgba1c, lipid panel, iron panel, transferrin  -Encourage alcohol cessation    #Alcoholic hepatitis, DF 12  Patient with examination notable for withdrawal and elevated liver biochemistries.    -CIWA  -MVI/Thiamine/folate  -Encourage alcohol cessation    Case d/w Dr. Dr. Gibbs

## 2019-12-05 NOTE — PROGRESS NOTE ADULT - PROBLEM SELECTOR PLAN 4
Elevated LFTs on labs, AST>ALT. No hepatomegally noted on exam. GI consulted and aware of patient  - Appreciate GI recs, will follow up with recommendations  - U/s ordered, will follow up with final read

## 2019-12-05 NOTE — PROGRESS NOTE ADULT - PROBLEM SELECTOR PLAN 1
EGD and colonoscopy performed with no acute cause of bleeding identified. Possible mass identified in stomach, biopsies sent  - Continue to follow up with GI recommendations  - Continue PPI 40 mg BID  - Continue Ceftriaxone 1g qd for SBP prophylaxis per GI recommendations, continue course for 5 days  - Continue to trend Hgb with daily CBCs

## 2019-12-05 NOTE — PROGRESS NOTE ADULT - PROBLEM SELECTOR PLAN 5
RESOLVED - Pt noted to have lactate elevated to 3.0 at admission, down to 1.7 following fluid resuscitation. No s/s of sepsis or hypoperfusion.  - Continue to monitor for clinical improvement

## 2019-12-05 NOTE — PROGRESS NOTE ADULT - PROBLEM SELECTOR PLAN 2
RESOLVED: Patient reports diarrhea, denies bloody, dark or melanotic stools or movements  - Management as above  - Appreciate GI recs

## 2019-12-05 NOTE — DISCHARGE NOTE PROVIDER - CARE PROVIDER_API CALL
Jessica Shrestha (DO)  Internal Medicine; Preventive Medicine  [address] [place] [address]  Phone: [phone]  Fax: [phone]  Follow Up Time: 2 weeks Jessica Shrestha (DO)  Internal Medicine; Preventive Medicine  [address]  [place], [place] [address]  Phone: [phone]  Fax: [phone]  Follow Up Time:

## 2019-12-05 NOTE — PROGRESS NOTE ADULT - PROBLEM SELECTOR PLAN 3
Hgb 7.9 today, hemodynamically stable and not requiring fluids or pressors. Continue to monitor BP  - Trend daily CBC for Hgb monitoring  - Continue to follow GI recs, monitor for hematemesis and hematochezia

## 2019-12-05 NOTE — DISCHARGE NOTE PROVIDER - HOSPITAL COURSE
Patient is 59F with past medical history of gastritis and gastric ulcer    Presented with 2 days of hematochezia        Problem list    1. GI bleed - GI consulted due to presentation. EGD performed and found no for acute bleeding but was notable for small hiatal hernia, portal hypertensive gastropathy, duodenitis, and 1-2 cm submucosal gastric lesion. Colonscopy performed and revealed [id number]            Inpatient treatment course:     New medications:     Labs to be followed outpatient:     Exam to be followed outpatient: Patient is 59F with past medical history of gastritis and gastric ulcer    Presented with 2 days of hematochezia        Problem list    1. GI bleed - GI consulted due to presentation. EGD performed and found no for acute bleeding but was notable for small hiatal hernia, portal hypertensive gastropathy, duodenitis, and 1-2 cm submucosal gastric lesion. Colonoscopy performed and revealed moderate diverticulosis and multiple polyps. An abdominal ultrasound was performed but had not been reviewed prior to the discharge. Patient was instructed to follow up with GI as an outpatient.         New medications: None    Labs to be followed outpatient: None    Exam to be followed outpatient: None Patient is 59F with past medical history of gastritis and gastric ulcer    Presented with 2 days of hematochezia        Problem list    1. GI bleed - GI consulted due to presentation. EGD performed and found no for acute bleeding but was notable for small hiatal hernia, portal hypertensive gastropathy, duodenitis, and 1-2 cm submucosal gastric lesion. Colonoscopy performed and revealed moderate diverticulosis and multiple polyps. An abdominal ultrasound was performed but had not been reviewed prior to the discharge. Patient was instructed to follow up with GI as an outpatient.         New medications: PPI 40mg BID, Bactrim 1DS daily for 3 days. MVA, Folic Acid, Thiamine.         Labs to be followed outpatient: Cirrhosis Workup         Exam to be followed outpatient: None

## 2019-12-05 NOTE — DIETITIAN INITIAL EVALUATION ADULT. - ADD RECOMMEND
When diet is advanced, recommend add MVI, thiamine, FA 2/2 suspected alcohol abuse. Unable to provide diet education as pt off floor, please reattempt upon follow up.

## 2019-12-05 NOTE — DIETITIAN INITIAL EVALUATION ADULT. - PROBLEM SELECTOR PLAN 2
BRBPR since Tuesday, improved today but persists  per pt was very watery but no risk factors for C diff like recent abx use, health care setting exposure, also improved w/o treatment  external hemorrhoids seen on exam -- likely cause vs brisk UGIB  GI aware, likely scope tomorrow  IV PPI BID

## 2019-12-05 NOTE — PROGRESS NOTE ADULT - SUBJECTIVE AND OBJECTIVE BOX
SUBJECTIVE / INTERVAL HPI: Patient seen and examined at bedside. Resting in bed with mild nausea. Otherwise no acute complaints. Denies repeat episodes of hematochezia. Reports notmelanotic bowel movements, described as diarrhea like, which she attributes to her bowel prep.     VITAL SIGNS:  Vital Signs Last 24 Hrs  T(C): 36.8 (05 Dec 2019 05:14), Max: 37.5 (04 Dec 2019 23:03)  T(F): 98.2 (05 Dec 2019 05:14), Max: 99.5 (04 Dec 2019 23:03)  HR: 88 (05 Dec 2019 05:14) (88 - 99)  BP: 103/66 (05 Dec 2019 05:14) (103/66 - 120/82)  RR: 18 (05 Dec 2019 05:14) (18 - 20)  SpO2: 98% (05 Dec 2019 05:14) (94% - 98%)    PHYSICAL EXAM:  General: Awake, alert. Well developed. Well nourished. NAD  HEENT: PERRL. No scleral icterus. MMM. No edema or erythema of the oropharynx. No tongue fasciculations.  Neck: Bilateral cervical lymphadenopathy.  CV: Tachardic, regular rhythm. No murmurs, rubs, or gallops.  Respiratory: Clear to auscultation bilaterally with no wheezes, rales, rhonchi.  GI: No rashes, lesions, varicosities appreciated. Abdomen is slightly distended. BS+ x4 quadrants. Tympanic to percussion. Mild tenderness to bilateral lower quadrants/suprapubic area. No tenderness to upper quadrants. Negative Carreon’s sign.00  MSK: Moving all extremities. No spinal tenderness.  Neuro: Alert and oriented x 3. No tremors.  Skin: No palmar erythema bilaterally.    MEDICATIONS:  MEDICATIONS  (STANDING):  cefTRIAXone   IVPB      cefTRIAXone   IVPB 1000 milliGRAM(s) IV Intermittent every 24 hours  influenza   Vaccine 0.5 milliLiter(s) IntraMuscular once  pantoprazole  Injectable 40 milliGRAM(s) IV Push every 12 hours    ALLERGIES: Cipro (Hives; Urticaria)    LABS:             7.9    3.80  )-----------( 97       ( 05 Dec 2019 08:19 )             25.1     144  |  109<H>  |  8   ----------------------------<  105<H>  4.9   |  23  |  0.52    Ca    8.9      05 Dec 2019 08:19  Phos  2.2     12-04  Mg     2.0     12-05    TPro  5.8<L>  /  Alb  3.6  /  TBili  1.7<H>  /  DBili  x   /  AST  74<H>  /  ALT  32  /  AlkPhos  157<H>  12-05    PT/INR - ( 03 Dec 2019 21:21 )   PT: 14.0 sec;   INR: 1.26          PTT - ( 03 Dec 2019 21:21 )  PTT:28.9 sec    CAPILLARY BLOOD GLUCOSE    RADIOLOGY & ADDITIONAL TESTS: Reviewed.

## 2019-12-05 NOTE — DISCHARGE NOTE PROVIDER - NSDCMRMEDTOKEN_GEN_ALL_CORE_FT
doxycycline hyclate 100 mg oral tablet: 1 tab(s) orally 2 times a day Bactrim  mg-160 mg oral tablet: 1 tab(s) orally once a day   folic acid 1 mg oral tablet: 1 tab(s) orally once a day   Multiple Vitamins oral tablet: 1 tab(s) orally once a day   pantoprazole 40 mg oral delayed release tablet: 1 tab(s) orally 2 times a day   thiamine 100 mg oral tablet: 1 tab(s) orally once a day

## 2019-12-05 NOTE — DISCHARGE NOTE PROVIDER - NSDCCPCAREPLAN_GEN_ALL_CORE_FT
PRINCIPAL DISCHARGE DIAGNOSIS  Diagnosis: Hematemesis  Assessment and Plan of Treatment: You were admtted to the hospital with a diagnosis of hematemesis. This is a condition in which you vomit blood. The most common cause of hematemesis in an individual who presents with your condition, is a gastrointestinal, or GI, bleed, which is blood loss into your stomach or intestines. The gastroenterology team was consulted and they performed an endoscopy to evaluate your stomach and a colonoscopy to evaluate your large intestine.   Following your discharge, if you begin to experience recurring episodes of hematemesis, or have black or coffee ground consistancy vomit, please return to the emergency room for further evaluation and treatment. In addition, please return to the emergency room if you have chest pain, shortness of breath, feel fatigued or lose consiousness, as these may be potential manifestations of blood loss. You also may see bloody stool or dark, sticky bowel movements, which should be further evaluated by your gastroenterologist if they are found.      SECONDARY DISCHARGE DIAGNOSES  Diagnosis: Hypomagnesemia  Assessment and Plan of Treatment:     Diagnosis: Hypokalemia  Assessment and Plan of Treatment: PRINCIPAL DISCHARGE DIAGNOSIS  Diagnosis: Hematemesis  Assessment and Plan of Treatment: You were admtted to the hospital with a diagnosis of hematemesis. This is a condition in which you vomit blood. The most common cause of hematemesis in an individual who presents with your condition, is a gastrointestinal, or GI, bleed, which is blood loss into your stomach or intestines. The gastroenterology team was consulted and they performed an endoscopy to evaluate your stomach and a colonoscopy to evaluate your large intestine, however no source of bleeding was found. Moderate diverticulitis and several polyps were visualized durring the procedure. Please follow up with your gastroenterologist for ongoing treatment and managment of this condition.  Following your discharge, if you begin to experience recurring episodes of hematemesis, or have black or coffee ground consistancy vomit, please return to the emergency room for further evaluation and treatment. In addition, please return to the emergency room if you have chest pain, shortness of breath, feel fatigued or lose consiousness, as these may be potential manifestations of blood loss. You also may see bloody stool or dark, sticky bowel movements, which should be further evaluated by your gastroenterologist if they are found. PRINCIPAL DISCHARGE DIAGNOSIS  Diagnosis: Hematemesis  Assessment and Plan of Treatment: You were admitted to the hospital with a diagnosis of hematemesis. This is a condition in which you vomit blood. The most common cause of hematemesis in an individual who presents with your condition, is a gastrointestinal, or GI, bleed, which is blood loss into your stomach or intestines. The gastroenterology team was consulted and they performed an endoscopy to evaluate your stomach and a colonoscopy to evaluate your large intestine, however no source of bleeding was found. Moderate diverticulitis and several polyps were visualized durring the procedure. Please follow up with your gastroenterologist for ongoing treatment and managment of this condition. You will continue pantoprazole 40mg twice a day until you see the Gastroenterologist and Bactrim 1 double strength tablet once a day for a further 3 days. The Bactrim (antibiotic) is for prophylaxis against an abdominal infection given suspected bleed.   Following your discharge, if you begin to experience recurring episodes of hematemesis, or have black or coffee ground consistancy vomit, please return to the emergency room for further evaluation and treatment. In addition, please return to the emergency room if you have chest pain, shortness of breath, feel fatigued or lose consiousness, as these may be potential manifestations of blood loss. You also may see bloody stool or dark, sticky bowel movements, which should be further evaluated by your gastroenterologist if they are found.      SECONDARY DISCHARGE DIAGNOSES  Diagnosis: Cirrhosis  Assessment and Plan of Treatment: You have been diagnosed with Cirrhosis, which is likely secondary to alcohol use. This was confirmed on ultrasound. A panel of lab tests was performed in the hospital to work up the cirrhosis. You will obtain the results of these tests at your appointment. Please take your multivitamin, folic acid, and thiamine as these are for nutrition purposes as they are typically low due to alcohol use.

## 2019-12-05 NOTE — PROGRESS NOTE ADULT - SUBJECTIVE AND OBJECTIVE BOX
Pt seen and examined at bedside.  Patient tolerated colonoscopy today without evidence of active bleeding.  Hgb otherwise stable.  Ultrasound now demonstrates stigmata of cirrhosis, which is consistent with laboratory findings.  Denies fever, chill, chest pain, shortness of breath, abdominal or epigastric pain, nausea, vomiting, hematemesis, diarrhea, constipation, melena, or hematochezia.     Allergies    Cipro (Hives; Urticaria)    Intolerances      MEDICATIONS:  MEDICATIONS  (STANDING):  cefTRIAXone   IVPB      cefTRIAXone   IVPB 1000 milliGRAM(s) IV Intermittent every 24 hours  influenza   Vaccine 0.5 milliLiter(s) IntraMuscular once  pantoprazole  Injectable 40 milliGRAM(s) IV Push every 12 hours    MEDICATIONS  (PRN):    Vital Signs Last 24 Hrs  T(C): 36.7 (05 Dec 2019 13:56), Max: 37.5 (04 Dec 2019 23:03)  T(F): 98 (05 Dec 2019 13:56), Max: 99.5 (04 Dec 2019 23:03)  HR: 86 (05 Dec 2019 13:56) (86 - 99)  BP: 127/79 (05 Dec 2019 13:56) (103/66 - 127/79)  BP(mean): --  RR: 18 (05 Dec 2019 13:56) (18 - 20)  SpO2: 98% (05 Dec 2019 13:56) (94% - 98%)    12-04 @ 07:01  -  12-05 @ 07:00  --------------------------------------------------------  IN: 1500 mL / OUT: 0 mL / NET: 1500 mL    12-05 @ 07:01  -  12-05 @ 16:02  --------------------------------------------------------  IN: 0 mL / OUT: 0 mL / NET: 0 mL      PHYSICAL EXAM:  General: WDWN  Eyes: moist conjunctivae, sclerae anicteric  HENT: Moist mucous membranes, tongue fasciculation  Neck: Trachea midline, supple  Lungs: Normal respiratory effort and no intercostal retractions  Cardiovascular: RRR, S1S2  Abdomen: Soft, non-tender, mildly distended Normal bowel sounds; No rebound or guarding  Rectal exam: External hemorrhoid, melenic stool in rectal vault  Extremities: Normal range of motion, No pitting edema  Neurological: Alert and oriented x3, tremor    LABS:                        7.9    3.80  )-----------( 97       ( 05 Dec 2019 08:19 )             25.1     12-05    144  |  109<H>  |  8   ----------------------------<  105<H>  4.9   |  23  |  0.52    Ca    8.9      05 Dec 2019 08:19  Phos  2.2     12-04  Mg     2.0     12-05    TPro  5.8<L>  /  Alb  3.6  /  TBili  1.7<H>  /  DBili  x   /  AST  74<H>  /  ALT  32  /  AlkPhos  157<H>  12-05    PT/INR - ( 03 Dec 2019 21:21 )   PT: 14.0 sec;   INR: 1.26          PTT - ( 03 Dec 2019 21:21 )  PTT:28.9 sec                  RADIOLOGY & ADDITIONAL STUDIES: Pt seen and examined at bedside.  Patient tolerated colonoscopy today without evidence of active bleeding.  Hgb otherwise stable.  Ultrasound now demonstrates stigmata of cirrhosis, which is consistent with laboratory findings.  Denies fever, chill, chest pain, shortness of breath, abdominal or epigastric pain, nausea, vomiting, hematemesis, diarrhea, constipation, melena, or hematochezia.     Allergies    Cipro (Hives; Urticaria)    Intolerances      MEDICATIONS:  MEDICATIONS  (STANDING):  cefTRIAXone   IVPB      cefTRIAXone   IVPB 1000 milliGRAM(s) IV Intermittent every 24 hours  influenza   Vaccine 0.5 milliLiter(s) IntraMuscular once  pantoprazole  Injectable 40 milliGRAM(s) IV Push every 12 hours    MEDICATIONS  (PRN):    Vital Signs Last 24 Hrs  T(C): 36.7 (05 Dec 2019 13:56), Max: 37.5 (04 Dec 2019 23:03)  T(F): 98 (05 Dec 2019 13:56), Max: 99.5 (04 Dec 2019 23:03)  HR: 86 (05 Dec 2019 13:56) (86 - 99)  BP: 127/79 (05 Dec 2019 13:56) (103/66 - 127/79)  BP(mean): --  RR: 18 (05 Dec 2019 13:56) (18 - 20)  SpO2: 98% (05 Dec 2019 13:56) (94% - 98%)    12-04 @ 07:01  -  12-05 @ 07:00  --------------------------------------------------------  IN: 1500 mL / OUT: 0 mL / NET: 1500 mL    12-05 @ 07:01  -  12-05 @ 16:02  --------------------------------------------------------  IN: 0 mL / OUT: 0 mL / NET: 0 mL      PHYSICAL EXAM:  General: elderly female comfortable in bed, in nad  Eyes: moist conjunctivae, sclerae anicteric  HENT: Moist mucous membranes, tongue fasciculation  Neck: Trachea midline, supple  Lungs: Normal respiratory effort and no intercostal retractions  Cardiovascular: RRR, S1S2  Abdomen: Soft, non-tender, nd, Normal bowel sounds; No rebound or guarding  Neurological: Alert and oriented x3, nonfocal exam    LABS:                        7.9    3.80  )-----------( 97       ( 05 Dec 2019 08:19 )             25.1     12-05    144  |  109<H>  |  8   ----------------------------<  105<H>  4.9   |  23  |  0.52    Ca    8.9      05 Dec 2019 08:19  Phos  2.2     12-04  Mg     2.0     12-05    TPro  5.8<L>  /  Alb  3.6  /  TBili  1.7<H>  /  DBili  x   /  AST  74<H>  /  ALT  32  /  AlkPhos  157<H>  12-05    PT/INR - ( 03 Dec 2019 21:21 )   PT: 14.0 sec;   INR: 1.26          PTT - ( 03 Dec 2019 21:21 )  PTT:28.9 sec    Ultrasound reviewed

## 2019-12-05 NOTE — DIETITIAN INITIAL EVALUATION ADULT. - DIET TYPE
As medically feasible, recommend advance diet towards regular, consider addition of low fiber 2/2 duodenitis. Monitor need for addition of ONS.

## 2019-12-05 NOTE — DIETITIAN INITIAL EVALUATION ADULT. - OTHER INFO
Pt seen for initial assessment. 59YOF with PMH of possible alcohol abuse (per chart, PCP, patient denies), lyme disease, pyelonephritis who presented to [facility] on 12/3 c/o 2 days of hematemesis/hematochezia, found to have acute drop in Hb, admitted for acute anemia 2/2 UGIB (variceal vs PUD). S/P EGD which was unrevealing for acute bleeding but was notable for small hiatal hernia, portal hypertensive gastropathy, duodenitis, and 1-2 cm submucosal gastric lesion. Patient with chronic alcohol abuse along with labs indicative of decreased synthesis/ liver disease- thrombocytopenia, elevated INR, low Alb, transaminitis. Skin: no edema, intact. Attempted to see pt several times today, off floor for EGD per RN. Pt is currently NPO. Unable to assess PO intake PTA and/or wt hx, however per EMR patient states she ate very little on Monday due to poor appetite with abdominal pain before starting vomiting blood in the evening around 3AM around 7 times. NKFA. Last BM 12/5 (drank iban). RD to follow up per protocol.

## 2019-12-06 LAB
MITOCHONDRIA AB SER-ACNC: SIGNIFICANT CHANGE UP
SMOOTH MUSCLE AB SER-ACNC: SIGNIFICANT CHANGE UP
SURGICAL PATHOLOGY STUDY: SIGNIFICANT CHANGE UP

## 2019-12-07 LAB
IGG FLD-MCNC: 849 MG/DL — SIGNIFICANT CHANGE UP (ref 610–1660)
LKM AB SER-ACNC: <20.1 UNITS — SIGNIFICANT CHANGE UP (ref 0–20)

## 2019-12-08 LAB — ANA TITR SER: NEGATIVE — SIGNIFICANT CHANGE UP

## 2019-12-11 DIAGNOSIS — K92.0 HEMATEMESIS: ICD-10-CM

## 2019-12-11 DIAGNOSIS — D69.6 THROMBOCYTOPENIA, UNSPECIFIED: ICD-10-CM

## 2019-12-11 DIAGNOSIS — K92.1 MELENA: ICD-10-CM

## 2019-12-11 DIAGNOSIS — K22.5 DIVERTICULUM OF ESOPHAGUS, ACQUIRED: ICD-10-CM

## 2019-12-11 DIAGNOSIS — K44.9 DIAPHRAGMATIC HERNIA WITHOUT OBSTRUCTION OR GANGRENE: ICD-10-CM

## 2019-12-11 DIAGNOSIS — D12.4 BENIGN NEOPLASM OF DESCENDING COLON: ICD-10-CM

## 2019-12-11 DIAGNOSIS — K64.4 RESIDUAL HEMORRHOIDAL SKIN TAGS: ICD-10-CM

## 2019-12-11 DIAGNOSIS — E87.6 HYPOKALEMIA: ICD-10-CM

## 2019-12-11 DIAGNOSIS — K31.89 OTHER DISEASES OF STOMACH AND DUODENUM: ICD-10-CM

## 2019-12-11 DIAGNOSIS — K70.10 ALCOHOLIC HEPATITIS WITHOUT ASCITES: ICD-10-CM

## 2019-12-11 DIAGNOSIS — K29.80 DUODENITIS WITHOUT BLEEDING: ICD-10-CM

## 2019-12-11 DIAGNOSIS — D50.9 IRON DEFICIENCY ANEMIA, UNSPECIFIED: ICD-10-CM

## 2019-12-11 DIAGNOSIS — K70.30 ALCOHOLIC CIRRHOSIS OF LIVER WITHOUT ASCITES: ICD-10-CM

## 2019-12-11 DIAGNOSIS — D12.3 BENIGN NEOPLASM OF TRANSVERSE COLON: ICD-10-CM

## 2019-12-11 DIAGNOSIS — R74.0 NONSPECIFIC ELEVATION OF LEVELS OF TRANSAMINASE AND LACTIC ACID DEHYDROGENASE [LDH]: ICD-10-CM

## 2019-12-11 DIAGNOSIS — D68.9 COAGULATION DEFECT, UNSPECIFIED: ICD-10-CM

## 2019-12-11 DIAGNOSIS — K76.6 PORTAL HYPERTENSION: ICD-10-CM

## 2019-12-11 DIAGNOSIS — E83.42 HYPOMAGNESEMIA: ICD-10-CM

## 2019-12-20 ENCOUNTER — OTHER (OUTPATIENT)
Age: 59
End: 2019-12-20

## 2020-01-01 ENCOUNTER — OUTPATIENT (OUTPATIENT)
Dept: OUTPATIENT SERVICES | Facility: HOSPITAL | Age: 60
LOS: 1 days | End: 2020-01-01
Payer: MEDICAID

## 2020-01-01 PROCEDURE — G9001: CPT

## 2020-01-02 DIAGNOSIS — Z71.89 OTHER SPECIFIED COUNSELING: ICD-10-CM

## 2020-01-02 PROBLEM — N12 TUBULO-INTERSTITIAL NEPHRITIS, NOT SPECIFIED AS ACUTE OR CHRONIC: Chronic | Status: ACTIVE | Noted: 2019-12-04

## 2020-01-08 ENCOUNTER — EMERGENCY (EMERGENCY)
Facility: HOSPITAL | Age: 60
LOS: 1 days | Discharge: HOME | End: 2020-01-08
Attending: EMERGENCY MEDICINE | Admitting: EMERGENCY MEDICINE
Payer: MEDICAID

## 2020-01-08 VITALS
DIASTOLIC BLOOD PRESSURE: 67 MMHG | HEART RATE: 88 BPM | OXYGEN SATURATION: 98 % | SYSTOLIC BLOOD PRESSURE: 103 MMHG | RESPIRATION RATE: 16 BRPM | TEMPERATURE: 98 F

## 2020-01-08 VITALS
WEIGHT: 115.08 LBS | DIASTOLIC BLOOD PRESSURE: 54 MMHG | RESPIRATION RATE: 17 BRPM | OXYGEN SATURATION: 99 % | TEMPERATURE: 97 F | HEART RATE: 84 BPM | SYSTOLIC BLOOD PRESSURE: 147 MMHG | HEIGHT: 62 IN

## 2020-01-08 PROCEDURE — 12051 INTMD RPR FACE/MM 2.5 CM/<: CPT

## 2020-01-08 PROCEDURE — 73110 X-RAY EXAM OF WRIST: CPT | Mod: 26,RT

## 2020-01-08 PROCEDURE — 99284 EMERGENCY DEPT VISIT MOD MDM: CPT | Mod: 25

## 2020-01-08 PROCEDURE — 70486 CT MAXILLOFACIAL W/O DYE: CPT | Mod: 26

## 2020-01-08 PROCEDURE — 73100 X-RAY EXAM OF WRIST: CPT | Mod: 26,59,RT

## 2020-01-08 PROCEDURE — 70450 CT HEAD/BRAIN W/O DYE: CPT | Mod: 26

## 2020-01-08 PROCEDURE — 72125 CT NECK SPINE W/O DYE: CPT | Mod: 26

## 2020-01-08 RX ORDER — TRAMADOL HYDROCHLORIDE 50 MG/1
1 TABLET ORAL
Qty: 12 | Refills: 0
Start: 2020-01-08

## 2020-01-08 NOTE — ED PROVIDER NOTE - OBJECTIVE STATEMENT
59 yof pw fall.  pt states she has sciatica of her hip, and her leg felt pain/weak and gave out.  she hit her head against countertop, and braced her fall with her right arm.  pain/swelling to right wrist.  denies cp/abd pain, denies sob.

## 2020-01-08 NOTE — ED ADULT NURSE REASSESSMENT NOTE - NS ED NURSE REASSESS COMMENT FT1
Orthopedic at bedside for R wrist reduction. Patient verbalizes understanding of plan of care, will continue to monitor pain.

## 2020-01-08 NOTE — ED PROVIDER NOTE - CLINICAL SUMMARY MEDICAL DECISION MAKING FREE TEXT BOX
appears mechanical fall, no prodrome, no syncope, hitting face against tabletop, R wrist deformity, no body/abd/pelvis trauma noted, low suspicion for intrathoracic or intraabd injury, will need ct, xray, analgesia  initial xray noted fx, d/w rad, no obvious intraarticular involvement, will attempt reduction in ED  ED reduction not successful, called [facility] ED to transfer pt for ortho reduction.  pt appears to have isolated wrist injury.  does not appear to need trauma evaluation, no indication for transfer to trauma center  paged out to Ortho resident on call

## 2020-01-08 NOTE — ED ADULT TRIAGE NOTE - CHIEF COMPLAINT QUOTE
Patient walk into ER with c/o right writ and right facial pain s/p mechanical fall. Pt reports vodka use tonight. Deformity to right wrist. Swelling and bruising and abrasion noted to right side of face. Pt denies LOC. Ambulatory with steady gait without difficulty into ER.

## 2020-01-08 NOTE — ED PROVIDER NOTE - PHYSICAL EXAMINATION
Physical Exam  GEN: Awake, alert, non-toxic appearing,   EYES: full EOMI, no proptosis, R lateral periorbital ecchymosis w/ laceration  ENT: External inspection normal, normal voice,   HEAD: no hematoma,   NECK: FROM neck, supple, no meningismus, no cervical spine midline tenderness  RESP: no tachypnea, no hypoxia, no resp distress,  MSK: R wrist deformed, tender, pain w/ rom, no chest wall tenderness, no abdominal wall tenderness  SKIN: see EYE exam for laceration, no chest/abd wall ecchymosis  NEURO: ao x 3, clear speech, moving all extremities (pain w/ ROM of R wrist) Physical Exam  GEN: Awake, alert, non-toxic appearing,   EYES: full EOMI, no proptosis, R lateral periorbital ecchymosis w/ laceration, ~1.2cm  ENT: External inspection normal, normal voice,   HEAD: no hematoma,   NECK: FROM neck, supple, no meningismus, no cervical spine midline tenderness  RESP: no tachypnea, no hypoxia, no resp distress,  MSK: R wrist deformed, tender, pain w/ rom, no chest wall tenderness, no abdominal wall tenderness  SKIN: see EYE exam for laceration, no chest/abd wall ecchymosis, no peripheral cyanosis, cap refill < 2 sec, radial pulse palpable  NEURO: ao x 3, clear speech, moving all extremities (pain w/ ROM of R wrist) Physical Exam  GEN: Awake, alert, non-toxic appearing,   EYES: full EOMI, no proptosis, R lateral periorbital ecchymosis w/ laceration, ~1.2cm, perrl, full EOMI, no evidence of globe rupture on exam  ENT: External inspection normal, normal voice,   HEAD: no hematoma,   NECK: FROM neck, supple, no meningismus, no cervical spine midline tenderness  RESP: no tachypnea, no hypoxia, no resp distress,  MSK: R wrist deformed, tender, pain w/ rom, no chest wall tenderness, no abdominal wall tenderness  SKIN: see EYE exam for laceration, no chest/abd wall ecchymosis, no peripheral cyanosis, cap refill < 2 sec, radial pulse palpable  NEURO: ao x 3, clear speech, moving all extremities (pain w/ ROM of R wrist)

## 2020-01-08 NOTE — ED ADULT NURSE NOTE - NSIMPLEMENTINTERV_GEN_ALL_ED
Implemented All Fall Risk Interventions:  [place] to call system. Call bell, personal items and telephone within reach. Instruct patient to call for assistance. Room bathroom lighting operational. Non-slip footwear when patient is off stretcher. Physically safe environment: no spills, clutter or unnecessary equipment. Stretcher in lowest position, wheels locked, appropriate side rails in place. Provide visual cue, wrist band, yellow gown, etc. Monitor gait and stability. Monitor for mental status changes and reorient to person, place, and time. Review medications for side effects contributing to fall risk. Reinforce activity limits and safety measures with patient and family.

## 2020-01-08 NOTE — ED PROVIDER NOTE - NSFOLLOWUPINSTRUCTIONS_ED_ALL_ED_FT
Follow up with your primary care doctor  Keep the wound dry and clean for 48 hours  Then you can gently clean with soap and water  Do NOT rub/scrub as it can open up the wound  After cleaning, apply topical bacitracin/neosporin  Avoid sun exposure to minimize scarring  Return in days to have the sutures/staples removed   Return immediately for any new or worsening symptoms or any new concerns Follow up with your primary care doctor  Follow up with the orthopedic clinic for your fracture  [facility]  [phone]   Keep the wound dry and clean for 48 hours  Then you can gently clean with soap and water  Do NOT rub/scrub as it can open up the wound  After cleaning, apply topical bacitracin/neosporin  Avoid sun exposure to minimize scarring  Return in days to have the sutures/staples removed   Return immediately for any new or worsening symptoms or any new concerns Follow up with your primary care doctor  Follow up with the orthopedic clinic for your fracture  [facility]  [phone]   Keep the wound dry and clean for 48 hours  Then you can gently clean with soap and water  Do NOT rub/scrub as it can open up the wound  After cleaning, apply topical bacitracin/neosporin  Avoid sun exposure to minimize scarring  There is a fracture in the bone surrounding your right eye from the fall.  Please follow up with an ophthalmologist/listed below.  Take augmentin for 5 days  Minimize sneezing as it can worsen the fracture.  Follow up with [facility]  [facility]  Our [facility] Clinic is open between the hours of 8:00am and 3:30pm, Monday through Friday.  Eye Clinic  Tel: [phone]  Address  [address] [place] [address], [address] [place] [address]  Return immediately for any new or worsening symptoms or any new concerns

## 2020-01-08 NOTE — ED PROVIDER NOTE - DIAGNOSTIC INTERPRETATION
ER Physician: Matthias Bryan  XRAY WRIST INTERPRETATION:  impacted distal radius fx; soft tissue swelling noted; abnormal bony alignment.   POST REDUCTION XRAY INTERPRETATION:  no significant improvement compared to pre reduction; abnormal bony alignment; impacted distal radius fx  POST REDUCTION INTERPRETATION:  improved compared to prior; distal radius fx; soft tissue swelling noted; improved bony alignment.

## 2020-01-08 NOTE — ED PROVIDER NOTE - PROGRESS NOTE DETAILS
paged out to ortho attending, films reviewed, will come in for reduction reduction by ortho attending Dr. Gomes  post reduction films reviewed by ortho

## 2020-01-08 NOTE — ED PROVIDER NOTE - PATIENT PORTAL LINK FT
You can access the FollowMyHealth Patient Portal offered by [facility] by registering at the following website: [email or web address]. By joining [employer]s FollowMyHealth portal, you will also be able to view your health information using other applications (apps) compatible with our system.

## 2020-01-08 NOTE — ED ADULT NURSE REASSESSMENT NOTE - NS ED NURSE REASSESS COMMENT FT1
MD Bryan at bedside for wrist reduction. Per verbal order, given an additional 4mg morphine. Will reassess pain. MD Bryan at bedside for wrist reduction. Per verbal order, given an additional 4mg morphine IV. Will reassess pain.

## 2020-01-08 NOTE — ED ADULT NURSE NOTE - OBJECTIVE STATEMENT
59y female presents to ED c/o mechanical fall today. AOx4 on arrival, landed on R wrist. +pain and swelling on R wrist, denies head trauma. Admits to ETOH use. Denies numbness, tingling, weakness. 59y female presents to ED c/o mechanical fall today. AOx4 on arrival, landed on R wrist. +pain and swelling on R wrist. Laceration on R eye with +bleeding, no changes in vision. Admits to ETOH use. Denies numbness, tingling, weakness.

## 2020-01-08 NOTE — ED PROVIDER NOTE - CARE PLAN
Principal Discharge DX:	Closed head injury  Secondary Diagnosis:	Facial laceration, initial encounter  Secondary Diagnosis:	Distal radius fracture, right Principal Discharge DX:	Closed head injury  Secondary Diagnosis:	Facial laceration, initial encounter  Secondary Diagnosis:	Distal radius fracture, right  Secondary Diagnosis:	Orbital floor fracture

## 2020-01-13 ENCOUNTER — APPOINTMENT (OUTPATIENT)
Dept: RADIOLOGY | Facility: CLINIC | Age: 60
End: 2020-01-13
Payer: MEDICAID

## 2020-01-13 ENCOUNTER — OUTPATIENT (OUTPATIENT)
Dept: OUTPATIENT SERVICES | Facility: HOSPITAL | Age: 60
LOS: 1 days | End: 2020-01-13

## 2020-01-13 PROCEDURE — 73110 X-RAY EXAM OF WRIST: CPT | Mod: 26,RT

## 2020-01-17 NOTE — CHART NOTE - NSCHARTNOTEFT_GEN_A_CORE
Upon Nutritional Assessment by the Registered Dietitian your patient was determined to meet criteria / has evidence of the following diagnosis/diagnoses:          [ ]  Mild Protein Calorie Malnutrition        [ ]  Moderate Protein Calorie Malnutrition        [ ] Severe Protein Calorie Malnutrition        [ ] Unspecified Protein Calorie Malnutrition        [ x ] Underweight / BMI <19        [ ] Morbid Obesity / BMI > 40      Findings as based on:  •  Comprehensive nutrition assessment and consultation  BMI 18.7kg/m2       Treatment:    See chart note.       PROVIDER Section:     By signing this assessment you are acknowledging and agree with the diagnosis/diagnoses assigned by the Registered Dietitian    Comments:
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
## 2020-01-19 DIAGNOSIS — S02.31XA FRACTURE OF ORBITAL FLOOR, RIGHT SIDE, INITIAL ENCOUNTER FOR CLOSED FRACTURE: ICD-10-CM
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
## 2020-01-19 DIAGNOSIS — M25.531 PAIN IN RIGHT WRIST: ICD-10-CM

## 2020-01-19 DIAGNOSIS — S01.411A LACERATION WITHOUT FOREIGN BODY OF RIGHT CHEEK AND TEMPOROMANDIBULAR AREA, INITIAL ENCOUNTER: ICD-10-CM

## 2020-01-19 DIAGNOSIS — S09.90XA UNSPECIFIED INJURY OF HEAD, INITIAL ENCOUNTER: ICD-10-CM

## 2020-01-19 DIAGNOSIS — S52.501A UNSPECIFIED FRACTURE OF THE LOWER END OF RIGHT RADIUS, INITIAL ENCOUNTER FOR CLOSED FRACTURE: ICD-10-CM
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
## 2020-01-20 ENCOUNTER — APPOINTMENT (OUTPATIENT)
Dept: RADIOLOGY | Facility: CLINIC | Age: 60
End: 2020-01-20
Payer: MEDICAID

## 2020-01-20 ENCOUNTER — OUTPATIENT (OUTPATIENT)
Dept: OUTPATIENT SERVICES | Facility: HOSPITAL | Age: 60
LOS: 1 days | End: 2020-01-20

## 2020-01-20 PROCEDURE — 73110 X-RAY EXAM OF WRIST: CPT | Mod: 26,RT

## 2020-01-21 ENCOUNTER — OUTPATIENT (OUTPATIENT)
Dept: OUTPATIENT SERVICES | Facility: HOSPITAL | Age: 60
LOS: 1 days | Discharge: HOME | End: 2020-01-21

## 2020-01-30 NOTE — CHART NOTE - NSCHARTNOTEFT_GEN_A_CORE
Prior attempts to reach patient has been unsuccessful.  Calling patient to discuss pathology result.  As patient with a tubulovillious adenoma, would continue with plan for repeat colonoscopy in 3 year.  Patient has followup in clinic on 3/5/20 to address capsule endoscopy and management of cirrhosis.    Surgical Pathology Report (12.05.19 @ 12:11)    Surgical Pathology Report:   ACCESSION No:  [id number]    TERE CUEVAS        Surgical Final Report          Final Diagnosis    1.Colon, hepatic flexure, polyps, biopsy:  - Tubular adenoma(s).    2. Colon, descending, polyp, biopsy:  - Tubulovillous adenoma.    Verified by: Erica Catalan M.D.  (Electronic Signature)  Reported on: 12/06/19 16:01 [facility], [address] [place] [address]  Phone: [phone]   Fax: [phone]  _________________________________________________________________    Clinical History  Anemia, hematochezia, melena.    Specimen(s) Submitted  1     Hepatic flexure polyps  2     Descending colon polyp    Gross Description  This case is received in two parts:  1. The specimen is received in formalin, labeled with the  patient's identification and "hepatic flexure polyps." It  consists of three irregular fragments of tan, soft tissue  measuring 0.3-0.4 cm in greatest dimension. The specimen is  entirely submitted  in one cassette, 1A.  2. The specimen is received in formalin, labeled with the  patient's identification and "descending colon polyp." It  consists of a 1.2 x 0.6 x 0.3 cm pink-tan polyp.  The resection  margin is inked blue.  The specimen is bisected and entirely  submitted in one cassette, 2A.    CEDRIC Allan [facility]P 12/05/2019 17:29

## 2020-02-01 ENCOUNTER — OUTPATIENT (OUTPATIENT)
Dept: OUTPATIENT SERVICES | Facility: HOSPITAL | Age: 60
LOS: 1 days | End: 2020-02-01
Payer: MEDICAID

## 2020-02-03 ENCOUNTER — APPOINTMENT (OUTPATIENT)
Dept: RADIOLOGY | Facility: CLINIC | Age: 60
End: 2020-02-03

## 2020-02-03 ENCOUNTER — OUTPATIENT (OUTPATIENT)
Dept: OUTPATIENT SERVICES | Facility: HOSPITAL | Age: 60
LOS: 1 days | End: 2020-02-03
Payer: MEDICAID

## 2020-02-03 PROCEDURE — 73110 X-RAY EXAM OF WRIST: CPT | Mod: 26,RT

## 2020-02-24 VITALS
OXYGEN SATURATION: 100 % | WEIGHT: 102.07 LBS | DIASTOLIC BLOOD PRESSURE: 70 MMHG | TEMPERATURE: 99 F | SYSTOLIC BLOOD PRESSURE: 108 MMHG | RESPIRATION RATE: 18 BRPM | HEIGHT: 64 IN | HEART RATE: 120 BPM

## 2020-02-24 LAB
APTT BLD: 29.6 SEC — SIGNIFICANT CHANGE UP (ref 27.5–36.3)
BASOPHILS # BLD AUTO: 0.02 K/UL — SIGNIFICANT CHANGE UP (ref 0–0.2)
BASOPHILS NFR BLD AUTO: 0.4 % — SIGNIFICANT CHANGE UP (ref 0–2)
EOSINOPHIL # BLD AUTO: 0.03 K/UL — SIGNIFICANT CHANGE UP (ref 0–0.5)
EOSINOPHIL NFR BLD AUTO: 0.6 % — SIGNIFICANT CHANGE UP (ref 0–6)
HCT VFR BLD CALC: 25.8 % — LOW (ref 34.5–45)
HGB BLD-MCNC: 8.6 G/DL — LOW (ref 11.5–15.5)
IMM GRANULOCYTES NFR BLD AUTO: 0.4 % — SIGNIFICANT CHANGE UP (ref 0–1.5)
INR BLD: 1.3 — HIGH (ref 0.88–1.16)
LYMPHOCYTES # BLD AUTO: 1.37 K/UL — SIGNIFICANT CHANGE UP (ref 1–3.3)
LYMPHOCYTES # BLD AUTO: 29.2 % — SIGNIFICANT CHANGE UP (ref 13–44)
MCHC RBC-ENTMCNC: 31.6 PG — SIGNIFICANT CHANGE UP (ref 27–34)
MCHC RBC-ENTMCNC: 33.3 GM/DL — SIGNIFICANT CHANGE UP (ref 32–36)
MCV RBC AUTO: 94.9 FL — SIGNIFICANT CHANGE UP (ref 80–100)
MONOCYTES # BLD AUTO: 0.55 K/UL — SIGNIFICANT CHANGE UP (ref 0–0.9)
MONOCYTES NFR BLD AUTO: 11.7 % — SIGNIFICANT CHANGE UP (ref 2–14)
NEUTROPHILS # BLD AUTO: 2.7 K/UL — SIGNIFICANT CHANGE UP (ref 1.8–7.4)
NEUTROPHILS NFR BLD AUTO: 57.7 % — SIGNIFICANT CHANGE UP (ref 43–77)
NRBC # BLD: 0 /100 WBCS — SIGNIFICANT CHANGE UP (ref 0–0)
PLATELET # BLD AUTO: 109 K/UL — LOW (ref 150–400)
PROTHROM AB SERPL-ACNC: 14.5 SEC — HIGH (ref 10–12.9)
RBC # BLD: 2.72 M/UL — LOW (ref 3.8–5.2)
RBC # FLD: 14.8 % — HIGH (ref 10.3–14.5)
WBC # BLD: 4.69 K/UL — SIGNIFICANT CHANGE UP (ref 3.8–10.5)
WBC # FLD AUTO: 4.69 K/UL — SIGNIFICANT CHANGE UP (ref 3.8–10.5)

## 2020-02-24 PROCEDURE — 71045 X-RAY EXAM CHEST 1 VIEW: CPT | Mod: 26

## 2020-02-24 RX ORDER — IOHEXOL 300 MG/ML
30 INJECTION, SOLUTION INTRAVENOUS ONCE
Refills: 0 | Status: COMPLETED | OUTPATIENT
Start: 2020-02-24 | End: 2020-02-24

## 2020-02-24 RX ORDER — ONDANSETRON 8 MG/1
4 TABLET, FILM COATED ORAL ONCE
Refills: 0 | Status: COMPLETED | OUTPATIENT
Start: 2020-02-24 | End: 2020-02-24

## 2020-02-24 RX ORDER — SODIUM CHLORIDE 9 MG/ML
1450 INJECTION INTRAMUSCULAR; INTRAVENOUS; SUBCUTANEOUS ONCE
Refills: 0 | Status: COMPLETED | OUTPATIENT
Start: 2020-02-24 | End: 2020-02-24

## 2020-02-24 RX ADMIN — ONDANSETRON 4 MILLIGRAM(S): 8 TABLET, FILM COATED ORAL at 23:59

## 2020-02-24 RX ADMIN — SODIUM CHLORIDE 1450 MILLILITER(S): 9 INJECTION INTRAMUSCULAR; INTRAVENOUS; SUBCUTANEOUS at 23:24

## 2020-02-24 RX ADMIN — IOHEXOL 30 MILLILITER(S): 300 INJECTION, SOLUTION INTRAVENOUS at 23:59

## 2020-02-24 NOTE — ED ADULT NURSE NOTE - CHIEF COMPLAINT QUOTE
PT complaining of dark tarry stool and vomit tinged with blood x2 days. PT complaining of dizziness. Pt denies sob. PT has history of GI bleed. Surgery to right 4 weeks ago.

## 2020-02-24 NOTE — ED ADULT NURSE NOTE - CHPI ED NUR SYMPTOMS NEG
no diarrhea/no hematuria/no fever/no burning urination/no chills/no nausea/no dysuria/no abdominal distension

## 2020-02-24 NOTE — ED ADULT NURSE NOTE - OBJECTIVE STATEMENT
61 y/o F c/o dark tarry stool and blood tinged vomit for approx the past two days. Pt also c/o dizziness. Pt has PMH of GI bleed. Pt denies recent illness, fever, CP, N/V/D, dysuria. Pt recently broke R wrist and had sx approx 4 weeks ago. Pt denies additional PMH.

## 2020-02-24 NOTE — ED ADULT NURSE NOTE - NSIMPLEMENTINTERV_GEN_ALL_ED
Implemented All Fall Risk Interventions:  [place] to call system. Call bell, personal items and telephone within reach. Instruct patient to call for assistance. Room bathroom lighting operational. Non-slip footwear when patient is off stretcher. Physically safe environment: no spills, clutter or unnecessary equipment. Stretcher in lowest position, wheels locked, appropriate side rails in place. Provide visual cue, wrist band, yellow gown, etc. Monitor gait and stability. Monitor for mental status changes and reorient to person, place, and time. Review medications for side effects contributing to fall risk. Reinforce activity limits and safety measures with patient and family.

## 2020-02-24 NOTE — ED ADULT TRIAGE NOTE - CHIEF COMPLAINT QUOTE
PT complaining of dark tarry stool and vomit tinged with blood x2 days. PT complaining of dizziness. Pt denies sob. PT has history of GI bleed. Surgery to right 4 weeks ago. PT complaining of dark tarry stool and vomit tinged with blood x2 days. PT complaining of dizziness. Pt denies sob. PT has history of GI bleed. Surgery to right arm 4 weeks ago.

## 2020-02-25 ENCOUNTER — INPATIENT (INPATIENT)
Facility: HOSPITAL | Age: 60
LOS: 1 days | Discharge: HOME | DRG: 378 | End: 2020-02-27
Attending: HOSPITALIST | Admitting: HOSPITALIST
Payer: COMMERCIAL

## 2020-02-25 DIAGNOSIS — Z91.89 OTHER SPECIFIED PERSONAL RISK FACTORS, NOT ELSEWHERE CLASSIFIED: ICD-10-CM

## 2020-02-25 DIAGNOSIS — R74.0 NONSPECIFIC ELEVATION OF LEVELS OF TRANSAMINASE AND LACTIC ACID DEHYDROGENASE [LDH]: ICD-10-CM

## 2020-02-25 DIAGNOSIS — E87.6 HYPOKALEMIA: ICD-10-CM

## 2020-02-25 DIAGNOSIS — R63.8 OTHER SYMPTOMS AND SIGNS CONCERNING FOOD AND FLUID INTAKE: ICD-10-CM

## 2020-02-25 DIAGNOSIS — D50.0 IRON DEFICIENCY ANEMIA SECONDARY TO BLOOD LOSS (CHRONIC): ICD-10-CM

## 2020-02-25 DIAGNOSIS — D69.6 THROMBOCYTOPENIA, UNSPECIFIED: ICD-10-CM

## 2020-02-25 DIAGNOSIS — K92.2 GASTROINTESTINAL HEMORRHAGE, UNSPECIFIED: ICD-10-CM

## 2020-02-25 DIAGNOSIS — F10.10 ALCOHOL ABUSE, UNCOMPLICATED: ICD-10-CM

## 2020-02-25 LAB
ALBUMIN SERPL ELPH-MCNC: 3.3 G/DL — SIGNIFICANT CHANGE UP (ref 3.3–5)
ALBUMIN SERPL ELPH-MCNC: 3.8 G/DL — SIGNIFICANT CHANGE UP (ref 3.4–5)
ALP SERPL-CCNC: 143 U/L — HIGH (ref 40–120)
ALP SERPL-CCNC: 210 U/L — HIGH (ref 40–120)
ALT FLD-CCNC: 29 U/L — SIGNIFICANT CHANGE UP (ref 10–45)
ALT FLD-CCNC: 53 U/L — HIGH (ref 12–42)
ANION GAP SERPL CALC-SCNC: 11 MMOL/L — SIGNIFICANT CHANGE UP (ref 5–17)
ANION GAP SERPL CALC-SCNC: 11 MMOL/L — SIGNIFICANT CHANGE UP (ref 9–16)
APPEARANCE UR: CLEAR — SIGNIFICANT CHANGE UP
APTT BLD: 31.1 SEC — SIGNIFICANT CHANGE UP (ref 27.5–36.3)
AST SERPL-CCNC: 106 U/L — HIGH (ref 15–37)
AST SERPL-CCNC: 65 U/L — HIGH (ref 10–40)
BASOPHILS # BLD AUTO: 0 K/UL — SIGNIFICANT CHANGE UP (ref 0–0.2)
BASOPHILS NFR BLD AUTO: 0 % — SIGNIFICANT CHANGE UP (ref 0–2)
BILIRUB SERPL-MCNC: 1.4 MG/DL — HIGH (ref 0.2–1.2)
BILIRUB SERPL-MCNC: 2 MG/DL — HIGH (ref 0.2–1.2)
BILIRUB UR-MCNC: ABNORMAL
BLD GP AB SCN SERPL QL: NEGATIVE — SIGNIFICANT CHANGE UP
BLD GP AB SCN SERPL QL: NEGATIVE — SIGNIFICANT CHANGE UP
BUN SERPL-MCNC: 29 MG/DL — HIGH (ref 7–23)
BUN SERPL-MCNC: 37 MG/DL — HIGH (ref 7–23)
CALCIUM SERPL-MCNC: 10.3 MG/DL — SIGNIFICANT CHANGE UP (ref 8.5–10.5)
CALCIUM SERPL-MCNC: 8.7 MG/DL — SIGNIFICANT CHANGE UP (ref 8.4–10.5)
CHLORIDE SERPL-SCNC: 104 MMOL/L — SIGNIFICANT CHANGE UP (ref 96–108)
CHLORIDE SERPL-SCNC: 94 MMOL/L — LOW (ref 96–108)
CO2 SERPL-SCNC: 22 MMOL/L — SIGNIFICANT CHANGE UP (ref 22–31)
CO2 SERPL-SCNC: 28 MMOL/L — SIGNIFICANT CHANGE UP (ref 22–31)
COLOR SPEC: YELLOW — SIGNIFICANT CHANGE UP
CREAT SERPL-MCNC: 0.49 MG/DL — LOW (ref 0.5–1.3)
CREAT SERPL-MCNC: 0.71 MG/DL — SIGNIFICANT CHANGE UP (ref 0.5–1.3)
DIFF PNL FLD: ABNORMAL
EOSINOPHIL # BLD AUTO: 0.04 K/UL — SIGNIFICANT CHANGE UP (ref 0–0.5)
EOSINOPHIL NFR BLD AUTO: 1.8 % — SIGNIFICANT CHANGE UP (ref 0–6)
ETHANOL SERPL-MCNC: <3 MG/DL — SIGNIFICANT CHANGE UP
GLUCOSE SERPL-MCNC: 112 MG/DL — HIGH (ref 70–99)
GLUCOSE SERPL-MCNC: 86 MG/DL — SIGNIFICANT CHANGE UP (ref 70–99)
GLUCOSE UR QL: NEGATIVE — SIGNIFICANT CHANGE UP
HCT VFR BLD CALC: 17.6 % — CRITICAL LOW (ref 34.5–45)
HCT VFR BLD CALC: 25 % — LOW (ref 34.5–45)
HGB BLD-MCNC: 5.7 G/DL — CRITICAL LOW (ref 11.5–15.5)
HGB BLD-MCNC: 8.3 G/DL — LOW (ref 11.5–15.5)
INR BLD: 1.41 — HIGH (ref 0.88–1.16)
KETONES UR-MCNC: 15 MG/DL
LACTATE SERPL-SCNC: 1.6 MMOL/L — SIGNIFICANT CHANGE UP (ref 0.4–2)
LACTATE SERPL-SCNC: 2.4 MMOL/L — HIGH (ref 0.4–2)
LEUKOCYTE ESTERASE UR-ACNC: NEGATIVE — SIGNIFICANT CHANGE UP
LYMPHOCYTES # BLD AUTO: 0.28 K/UL — LOW (ref 1–3.3)
LYMPHOCYTES # BLD AUTO: 11.6 % — LOW (ref 13–44)
MAGNESIUM SERPL-MCNC: 1.3 MG/DL — LOW (ref 1.6–2.6)
MCHC RBC-ENTMCNC: 32.4 GM/DL — SIGNIFICANT CHANGE UP (ref 32–36)
MCHC RBC-ENTMCNC: 32.4 PG — SIGNIFICANT CHANGE UP (ref 27–34)
MCHC RBC-ENTMCNC: 32.8 PG — SIGNIFICANT CHANGE UP (ref 27–34)
MCHC RBC-ENTMCNC: 33.2 GM/DL — SIGNIFICANT CHANGE UP (ref 32–36)
MCV RBC AUTO: 101.1 FL — HIGH (ref 80–100)
MCV RBC AUTO: 97.7 FL — SIGNIFICANT CHANGE UP (ref 80–100)
MONOCYTES # BLD AUTO: 0.07 K/UL — SIGNIFICANT CHANGE UP (ref 0–0.9)
MONOCYTES NFR BLD AUTO: 2.7 % — SIGNIFICANT CHANGE UP (ref 2–14)
NEUTROPHILS # BLD AUTO: 2 K/UL — SIGNIFICANT CHANGE UP (ref 1.8–7.4)
NEUTROPHILS NFR BLD AUTO: 78.5 % — HIGH (ref 43–77)
NITRITE UR-MCNC: NEGATIVE — SIGNIFICANT CHANGE UP
NRBC # BLD: 0 /100 WBCS — SIGNIFICANT CHANGE UP (ref 0–0)
OB PNL STL: POSITIVE
PH UR: 8 — SIGNIFICANT CHANGE UP (ref 5–8)
PHOSPHATE SERPL-MCNC: 1.6 MG/DL — LOW (ref 2.5–4.5)
PLATELET # BLD AUTO: 60 K/UL — LOW (ref 150–400)
PLATELET # BLD AUTO: 73 K/UL — LOW (ref 150–400)
POTASSIUM SERPL-MCNC: 3 MMOL/L — LOW (ref 3.5–5.3)
POTASSIUM SERPL-MCNC: 3.3 MMOL/L — LOW (ref 3.5–5.3)
POTASSIUM SERPL-SCNC: 3 MMOL/L — LOW (ref 3.5–5.3)
POTASSIUM SERPL-SCNC: 3.3 MMOL/L — LOW (ref 3.5–5.3)
PROT SERPL-MCNC: 5.3 G/DL — LOW (ref 6–8.3)
PROT SERPL-MCNC: 7.4 G/DL — SIGNIFICANT CHANGE UP (ref 6.4–8.2)
PROT UR-MCNC: NEGATIVE MG/DL — SIGNIFICANT CHANGE UP
PROTHROM AB SERPL-ACNC: 16.2 SEC — HIGH (ref 10–12.9)
RBC # BLD: 1.74 M/UL — LOW (ref 3.8–5.2)
RBC # BLD: 2.56 M/UL — LOW (ref 3.8–5.2)
RBC # FLD: 15.1 % — HIGH (ref 10.3–14.5)
RBC # FLD: 16.4 % — HIGH (ref 10.3–14.5)
RH IG SCN BLD-IMP: NEGATIVE — SIGNIFICANT CHANGE UP
RH IG SCN BLD-IMP: NEGATIVE — SIGNIFICANT CHANGE UP
SODIUM SERPL-SCNC: 133 MMOL/L — SIGNIFICANT CHANGE UP (ref 132–145)
SODIUM SERPL-SCNC: 137 MMOL/L — SIGNIFICANT CHANGE UP (ref 135–145)
SP GR SPEC: 1.01 — SIGNIFICANT CHANGE UP (ref 1–1.03)
TROPONIN T SERPL-MCNC: <0.01 NG/ML — SIGNIFICANT CHANGE UP (ref 0–0.01)
UROBILINOGEN FLD QL: 4 E.U./DL
WBC # BLD: 2.43 K/UL — LOW (ref 3.8–10.5)
WBC # BLD: 3.35 K/UL — LOW (ref 3.8–10.5)
WBC # FLD AUTO: 2.43 K/UL — LOW (ref 3.8–10.5)
WBC # FLD AUTO: 3.35 K/UL — LOW (ref 3.8–10.5)

## 2020-02-25 PROCEDURE — 93010 ELECTROCARDIOGRAM REPORT: CPT

## 2020-02-25 PROCEDURE — 99223 1ST HOSP IP/OBS HIGH 75: CPT | Mod: GC

## 2020-02-25 PROCEDURE — 99223 1ST HOSP IP/OBS HIGH 75: CPT

## 2020-02-25 PROCEDURE — 71045 X-RAY EXAM CHEST 1 VIEW: CPT | Mod: 26

## 2020-02-25 PROCEDURE — 99285 EMERGENCY DEPT VISIT HI MDM: CPT | Mod: 25

## 2020-02-25 RX ORDER — THIAMINE MONONITRATE (VIT B1) 100 MG
100 TABLET ORAL DAILY
Refills: 0 | Status: DISCONTINUED | OUTPATIENT
Start: 2020-02-25 | End: 2020-02-27

## 2020-02-25 RX ORDER — OCTREOTIDE ACETATE 200 UG/ML
50 INJECTION, SOLUTION INTRAVENOUS; SUBCUTANEOUS
Qty: 500 | Refills: 0 | Status: DISCONTINUED | OUTPATIENT
Start: 2020-02-25 | End: 2020-02-26

## 2020-02-25 RX ORDER — SODIUM CHLORIDE 9 MG/ML
1000 INJECTION, SOLUTION INTRAVENOUS
Refills: 0 | Status: DISCONTINUED | OUTPATIENT
Start: 2020-02-25 | End: 2020-02-26

## 2020-02-25 RX ORDER — POTASSIUM CHLORIDE 20 MEQ
10 PACKET (EA) ORAL
Refills: 0 | Status: COMPLETED | OUTPATIENT
Start: 2020-02-25 | End: 2020-02-25

## 2020-02-25 RX ORDER — CEFTRIAXONE 500 MG/1
1000 INJECTION, POWDER, FOR SOLUTION INTRAMUSCULAR; INTRAVENOUS EVERY 24 HOURS
Refills: 0 | Status: DISCONTINUED | OUTPATIENT
Start: 2020-02-25 | End: 2020-02-26

## 2020-02-25 RX ORDER — PANTOPRAZOLE SODIUM 20 MG/1
8 TABLET, DELAYED RELEASE ORAL
Qty: 80 | Refills: 0 | Status: DISCONTINUED | OUTPATIENT
Start: 2020-02-25 | End: 2020-02-26

## 2020-02-25 RX ORDER — LIDOCAINE 4 G/100G
1 CREAM TOPICAL ONCE
Refills: 0 | Status: COMPLETED | OUTPATIENT
Start: 2020-02-25 | End: 2020-02-25

## 2020-02-25 RX ORDER — MORPHINE SULFATE 50 MG/1
4 CAPSULE, EXTENDED RELEASE ORAL ONCE
Refills: 0 | Status: DISCONTINUED | OUTPATIENT
Start: 2020-02-25 | End: 2020-02-25

## 2020-02-25 RX ORDER — DEXTROSE MONOHYDRATE, SODIUM CHLORIDE, AND POTASSIUM CHLORIDE 50; .745; 4.5 G/1000ML; G/1000ML; G/1000ML
1000 INJECTION, SOLUTION INTRAVENOUS
Refills: 0 | Status: DISCONTINUED | OUTPATIENT
Start: 2020-02-25 | End: 2020-02-25

## 2020-02-25 RX ORDER — PANTOPRAZOLE SODIUM 20 MG/1
80 TABLET, DELAYED RELEASE ORAL ONCE
Refills: 0 | Status: COMPLETED | OUTPATIENT
Start: 2020-02-25 | End: 2020-02-25

## 2020-02-25 RX ORDER — MAGNESIUM SULFATE 500 MG/ML
4 VIAL (ML) INJECTION ONCE
Refills: 0 | Status: COMPLETED | OUTPATIENT
Start: 2020-02-25 | End: 2020-02-25

## 2020-02-25 RX ORDER — SODIUM CHLORIDE 9 MG/ML
1000 INJECTION, SOLUTION INTRAVENOUS
Refills: 0 | Status: DISCONTINUED | OUTPATIENT
Start: 2020-02-25 | End: 2020-02-25

## 2020-02-25 RX ORDER — FOLIC ACID 0.8 MG
1 TABLET ORAL DAILY
Refills: 0 | Status: DISCONTINUED | OUTPATIENT
Start: 2020-02-25 | End: 2020-02-27

## 2020-02-25 RX ORDER — MORPHINE SULFATE 50 MG/1
2 CAPSULE, EXTENDED RELEASE ORAL ONCE
Refills: 0 | Status: DISCONTINUED | OUTPATIENT
Start: 2020-02-25 | End: 2020-02-25

## 2020-02-25 RX ORDER — SODIUM CHLORIDE 9 MG/ML
1000 INJECTION INTRAMUSCULAR; INTRAVENOUS; SUBCUTANEOUS ONCE
Refills: 0 | Status: COMPLETED | OUTPATIENT
Start: 2020-02-25 | End: 2020-02-25

## 2020-02-25 RX ORDER — OCTREOTIDE ACETATE 200 UG/ML
50 INJECTION, SOLUTION INTRAVENOUS; SUBCUTANEOUS ONCE
Refills: 0 | Status: COMPLETED | OUTPATIENT
Start: 2020-02-25 | End: 2020-02-25

## 2020-02-25 RX ADMIN — LIDOCAINE 1 PATCH: 4 CREAM TOPICAL at 23:44

## 2020-02-25 RX ADMIN — MORPHINE SULFATE 4 MILLIGRAM(S): 50 CAPSULE, EXTENDED RELEASE ORAL at 04:13

## 2020-02-25 RX ADMIN — OCTREOTIDE ACETATE 10 MICROGRAM(S)/HR: 200 INJECTION, SOLUTION INTRAVENOUS; SUBCUTANEOUS at 16:05

## 2020-02-25 RX ADMIN — MORPHINE SULFATE 4 MILLIGRAM(S): 50 CAPSULE, EXTENDED RELEASE ORAL at 04:08

## 2020-02-25 RX ADMIN — MORPHINE SULFATE 2 MILLIGRAM(S): 50 CAPSULE, EXTENDED RELEASE ORAL at 21:39

## 2020-02-25 RX ADMIN — PANTOPRAZOLE SODIUM 80 MILLIGRAM(S): 20 TABLET, DELAYED RELEASE ORAL at 01:18

## 2020-02-25 RX ADMIN — Medication 100 MILLIEQUIVALENT(S): at 14:08

## 2020-02-25 RX ADMIN — MORPHINE SULFATE 2 MILLIGRAM(S): 50 CAPSULE, EXTENDED RELEASE ORAL at 22:00

## 2020-02-25 RX ADMIN — SODIUM CHLORIDE 90 MILLILITER(S): 9 INJECTION, SOLUTION INTRAVENOUS at 06:51

## 2020-02-25 RX ADMIN — LIDOCAINE 1 PATCH: 4 CREAM TOPICAL at 12:00

## 2020-02-25 RX ADMIN — SODIUM CHLORIDE 1000 MILLILITER(S): 9 INJECTION INTRAMUSCULAR; INTRAVENOUS; SUBCUTANEOUS at 01:28

## 2020-02-25 RX ADMIN — Medication 100 MILLIEQUIVALENT(S): at 18:06

## 2020-02-25 RX ADMIN — LIDOCAINE 1 PATCH: 4 CREAM TOPICAL at 20:45

## 2020-02-25 RX ADMIN — CEFTRIAXONE 100 MILLIGRAM(S): 500 INJECTION, POWDER, FOR SOLUTION INTRAMUSCULAR; INTRAVENOUS at 10:55

## 2020-02-25 RX ADMIN — DEXTROSE MONOHYDRATE, SODIUM CHLORIDE, AND POTASSIUM CHLORIDE 1000 MILLILITER(S): 50; .745; 4.5 INJECTION, SOLUTION INTRAVENOUS at 02:30

## 2020-02-25 RX ADMIN — Medication 100 GRAM(S): at 12:37

## 2020-02-25 RX ADMIN — SODIUM CHLORIDE 1450 MILLILITER(S): 9 INJECTION INTRAMUSCULAR; INTRAVENOUS; SUBCUTANEOUS at 00:29

## 2020-02-25 RX ADMIN — PANTOPRAZOLE SODIUM 10 MG/HR: 20 TABLET, DELAYED RELEASE ORAL at 18:03

## 2020-02-25 RX ADMIN — SODIUM CHLORIDE 1000 MILLILITER(S): 9 INJECTION INTRAMUSCULAR; INTRAVENOUS; SUBCUTANEOUS at 02:30

## 2020-02-25 RX ADMIN — PANTOPRAZOLE SODIUM 10 MG/HR: 20 TABLET, DELAYED RELEASE ORAL at 01:24

## 2020-02-25 RX ADMIN — OCTREOTIDE ACETATE 50 MICROGRAM(S): 200 INJECTION, SOLUTION INTRAVENOUS; SUBCUTANEOUS at 14:54

## 2020-02-25 RX ADMIN — Medication 100 MILLIEQUIVALENT(S): at 16:12

## 2020-02-25 RX ADMIN — PANTOPRAZOLE SODIUM 10 MG/HR: 20 TABLET, DELAYED RELEASE ORAL at 08:31

## 2020-02-25 RX ADMIN — DEXTROSE MONOHYDRATE, SODIUM CHLORIDE, AND POTASSIUM CHLORIDE 1000 MILLILITER(S): 50; .745; 4.5 INJECTION, SOLUTION INTRAVENOUS at 01:30

## 2020-02-25 NOTE — ED PROVIDER NOTE - CONSTITUTIONAL, MLM
normal... Pale, frail, awake, alert, oriented to person, place, time/situation and in no apparent distress.

## 2020-02-25 NOTE — DIETITIAN INITIAL EVALUATION ADULT. - PROBLEM SELECTOR PLAN 8
home
1.       PCP Contacted on Admission: (Y/N) --> Name & Phone #:  2.       Date of Contact with PCP:  3.       PCP Contacted at Discharge: (Y/N)  4.       Summary of Handoff Given to PCP:  5.       Post-Discharge Appointment Date and Location: [facility]

## 2020-02-25 NOTE — PROGRESS NOTE ADULT - SUBJECTIVE AND OBJECTIVE BOX
OVERNIGHT EVENTS:    SUBJECTIVE: Patient seen and examined at the bedside.     Vital Signs Last 12 Hrs  T(F): 98.7 (20 @ 05:56), Max: 98.7 (20 @ 22:39)  HR: 99 (20 @ 09:41) (99 - 120)  BP: 86/53 (20 @ 09:41) (86/53 - 108/70)  BP(mean): 64 (20 @ 09:41) (64 - 65)  RR: 18 (20 @ 09:41) (16 - 18)  SpO2: 99% (20 @ 09:41) (97% - 114%)  I&O's Summary    2020 07:01  -  2020 07:00  --------------------------------------------------------  IN: 0 mL / OUT: 300 mL / NET: -300 mL        PHYSICAL EXAM:  General: In no acute distress, resting comfortably in bed  HEENT: NCAT, PERRL, EOMI, no conjunctival pallor or scleral icterus, MMM  Neck: Supple, no JVD  Respiratory: Clear to auscultation bilaterally with no wheezes, rales, or rhonchi appreciated  Cardiovascular: RRR, normal S1 and S2, no murmurs, rubs, or gallops appreciated  Vascular: 2+ radial and DP pulses  Abdomen: Soft, NT/ND. Bowel sounds present in all four quadrants with no guarding, rebound tenderness, or palpable masses  Extremities: Warm and well perfused. No clubbing, cyanosis, or edema noted  Skin: No gross skin abnormalities or rashes noted  Neuro: AAOx3 with no cranial nerve deficits. Strength and sensation intact throughout.    LABS:                        5.7    3.35  )-----------( 73       ( 2020 07:17 )             17.6         137  |  104  |  29<H>  ----------------------------<  86  3.3<L>   |  22  |  0.49<L>    Ca    8.7      2020 07:17  Phos  1.6     02-  Mg     1.3     -    TPro  5.3<L>  /  Alb  3.3  /  TBili  1.4<H>  /  DBili  x   /  AST  65<H>  /  ALT  29  /  AlkPhos  143<H>  02-25    PT/INR - ( 2020 07:17 )   PT: 16.2 sec;   INR: 1.41          PTT - ( 2020 07:17 )  PTT:31.1 sec  Urinalysis Basic - ( 2020 00:54 )    Color: Yellow / Appearance: Clear / S.010 / pH: x  Gluc: x / Ketone: 15 mg/dL  / Bili: Small / Urobili: 4.0 E.U./dL   Blood: x / Protein: NEGATIVE mg/dL / Nitrite: NEGATIVE   Leuk Esterase: NEGATIVE / RBC: < 5 /HPF / WBC 5-10 /HPF   Sq Epi: x / Non Sq Epi: 0-5 /HPF / Bacteria: Present /HPF        RADIOLOGY & ADDITIONAL TESTS: Reviewed.    MEDICATIONS  (STANDING):  cefTRIAXone   IVPB 1000 milliGRAM(s) IV Intermittent every 24 hours  folic acid 1 milliGRAM(s) Oral daily  lactated ringers. 1000 milliLiter(s) (90 mL/Hr) IV Continuous <Continuous>  lidocaine   Patch 1 Patch Transdermal once  magnesium sulfate  IVPB 4 Gram(s) IV Intermittent once  multivitamin 1 Tablet(s) Oral daily  octreotide  Infusion 50 MICROgram(s)/Hr (10 mL/Hr) IV Continuous <Continuous>  octreotide  Injectable 50 MICROGram(s) IV Push once  pantoprazole Infusion 8 mG/Hr (10 mL/Hr) IV Continuous <Continuous>  potassium chloride  10 mEq/100 mL IVPB 10 milliEquivalent(s) IV Intermittent every 1 hour  thiamine 100 milliGRAM(s) Oral daily    MEDICATIONS  (PRN):  LORazepam     Tablet 2 milliGRAM(s) Oral every 2 hours PRN CIWA-Ar score 8 or greater      Allergies    Cipro (Hives; Urticaria)    Intolerances OVERNIGHT EVENTS: None    SUBJECTIVE: Patient seen and examined at the bedside. She says she feels ok and just wants to know the plan.    Vital Signs Last 12 Hrs  T(F): 98.7 (20 @ 05:56), Max: 98.7 (20 @ 22:39)  HR: 99 (20 @ 09:41) (99 - 120)  BP: 86/53 (20 @ 09:41) (86/53 - 108/70)  BP(mean): 64 (20 @ 09:41) (64 - 65)  RR: 18 (20 @ 09:41) (16 - 18)  SpO2: 99% (20 @ 09:41) (97% - 114%)  I&O's Summary    2020 07:01  -  2020 07:00  --------------------------------------------------------  IN: 0 mL / OUT: 300 mL / NET: -300 mL        PHYSICAL EXAM:  General: In no acute distress, resting comfortably in bed  HEENT: NCAT, PERRL, EOMI, mild conjunctival pallor, MMM  Neck: Supple, no JVD  Respiratory: Clear to auscultation bilaterally with no wheezes, rales, or rhonchi  Cardiovascular: RRR, normal S1 and S2  Vascular: 2+ radial and DP pulses  Abdomen: Soft, NT/ND. Bowel sounds present in all four quadrants  Extremities: Warm and well perfused. No clubbing, cyanosis, or edema noted  Skin: No gross skin abnormalities or rashes noted  Neuro: AAOx3 with no cranial nerve deficits. Strength and sensation intact throughout.    LABS:                        5.7    3.35  )-----------( 73       ( 2020 07:17 )             17.6         137  |  104  |  29<H>  ----------------------------<  86  3.3<L>   |  22  |  0.49<L>    Ca    8.7      2020 07:17  Phos  1.6     02-  Mg     1.3     -25    TPro  5.3<L>  /  Alb  3.3  /  TBili  1.4<H>  /  DBili  x   /  AST  65<H>  /  ALT  29  /  AlkPhos  143<H>  -    PT/INR - ( 2020 07:17 )   PT: 16.2 sec;   INR: 1.41          PTT - ( 2020 07:17 )  PTT:31.1 sec  Urinalysis Basic - ( 2020 00:54 )    Color: Yellow / Appearance: Clear / S.010 / pH: x  Gluc: x / Ketone: 15 mg/dL  / Bili: Small / Urobili: 4.0 E.U./dL   Blood: x / Protein: NEGATIVE mg/dL / Nitrite: NEGATIVE   Leuk Esterase: NEGATIVE / RBC: < 5 /HPF / WBC 5-10 /HPF   Sq Epi: x / Non Sq Epi: 0-5 /HPF / Bacteria: Present /HPF        RADIOLOGY & ADDITIONAL TESTS: Reviewed.    MEDICATIONS  (STANDING):  cefTRIAXone   IVPB 1000 milliGRAM(s) IV Intermittent every 24 hours  folic acid 1 milliGRAM(s) Oral daily  lactated ringers. 1000 milliLiter(s) (90 mL/Hr) IV Continuous <Continuous>  lidocaine   Patch 1 Patch Transdermal once  magnesium sulfate  IVPB 4 Gram(s) IV Intermittent once  multivitamin 1 Tablet(s) Oral daily  octreotide  Infusion 50 MICROgram(s)/Hr (10 mL/Hr) IV Continuous <Continuous>  octreotide  Injectable 50 MICROGram(s) IV Push once  pantoprazole Infusion 8 mG/Hr (10 mL/Hr) IV Continuous <Continuous>  potassium chloride  10 mEq/100 mL IVPB 10 milliEquivalent(s) IV Intermittent every 1 hour  thiamine 100 milliGRAM(s) Oral daily    MEDICATIONS  (PRN):  LORazepam     Tablet 2 milliGRAM(s) Oral every 2 hours PRN CIWA-Ar score 8 or greater      Allergies    Cipro (Hives; Urticaria)    Intolerances

## 2020-02-25 NOTE — PROGRESS NOTE ADULT - PROBLEM SELECTOR PLAN 5
Patient presented with AST of 106, and ALT 53 likely 2/2 alcohol abuse.   - Trend CMP   - GI aware   - Education on alcohol cessation AST//53 likely from alcohol use    -Monitor LFTs  -GI following  -Alcohol cessation education

## 2020-02-25 NOTE — ED PROVIDER NOTE - CLINICAL SUMMARY MEDICAL DECISION MAKING FREE TEXT BOX
pt presents with vomiting/hematochezia, ?melena.  pt with no active vomiting in ed and with no margot melena on rectal exam; concern for upper gi bleed, pt with hx of gastric ulcer.  will obtain labs, ekg, give IVF and place two large bore IV, start protonix. will plan to admit to el.  gi consulted.

## 2020-02-25 NOTE — CONSULT NOTE ADULT - ASSESSMENT
58 YO F h/o EtOH abuse, EtOH cirrhosis, lyme disease, p/w hematemesis and melena x 3 days.    # Acute blood loss anemia 2/2 likely UGIB 2/2 portal hypertensive gastropathy vs Avani-Grace vs PUD vs less likely esophageal varices  - Pt with downtrending H/H, ordered for 2 u PRBCs, awaiting post-transfusion CBC  - Monitor H/H Q4-6H, transfuse to Hgb goal 7-9  - C/w PPI IVP BID  - Start octreotide bolus and gtt  - Start ceftriaxone for SBP PPx given history of cirrhosis with acute GI bleeding  - Will plan for EGD today  - NPO  - Further plan pending EGD, will need K >4, Mg>2, and Hgb >7 prior to proceeding to EGD    # EtOH cirrhosis (-PSE, -EV, -SBP) - MELD Na 13  - Ascites: No prior history of ascites  - EV: Last EGD 12/2019 without e/o EV  - PSE: No prior history of PSE, no e/o asterixis on exam  - Coaguloapthy: Consider trial of Vit K 10 mg IVP daily x 72 hours    Case d/w Dr. Barrera 58 YO F h/o EtOH abuse, EtOH cirrhosis, lyme disease, p/w hematemesis and melena x 3 days.    # Acute blood loss anemia 2/2 likely UGIB 2/2 portal hypertensive gastropathy vs Avani-Grace vs PUD vs less likely esophageal varices  - Pt with downtrending H/H, ordered for 2 u PRBCs, awaiting post-transfusion CBC  - Monitor H/H Q4-6H, transfuse to Hgb goal 7-9  - C/w PPI IVP BID  - Start octreotide bolus and gtt  - Start ceftriaxone for SBP PPx given history of cirrhosis with acute GI bleeding  - Will plan for EGD today  - NPO  - Further plan pending EGD, will need K >4, Mg>2, and Hgb >7 prior to proceeding to EGD    # EtOH cirrhosis (-PSE, -EV, -SBP) - MELD Na 13  - Ascites: No prior history of ascites  - EV: Last EGD 12/2019 without e/o EV  - PSE: No prior history of PSE, no e/o asterixis on exam  - Coaguloapthy: Consider trial of Vit K 10 mg IVP daily x 72 hours    Case d/w Dr. Borrero

## 2020-02-25 NOTE — PROGRESS NOTE ADULT - PROBLEM SELECTOR PLAN 7
F: S/p 3L NS   E: Replete for K<4.0 and Mg< 2.0   N: NPO for possible EGD   DVT: SCD  Dispo: 7L F: None  E: Replete for K<4.0 and Mg< 2.0   N: NPO for possible EGD   DVT: SCD  Dispo: 7L

## 2020-02-25 NOTE — PROGRESS NOTE ADULT - PROBLEM SELECTOR PLAN 4
Presented with K of 3.0, likely due to emesis.   - Replete for K<4.0   - Trend BMP Hypokalemia of 3.0 on admission in the setting of emesis    -Potassium supplementations  -Monitor BMP

## 2020-02-25 NOTE — H&P ADULT - PROBLEM SELECTOR PLAN 8
1.       PCP Contacted on Admission: (Y/N) --> Name & Phone #:  2.       Date of Contact with PCP:  3.       PCP Contacted at Discharge: (Y/N)  4.       Summary of Handoff Given to PCP:  5.       Post-Discharge Appointment Date and Location: [facility]

## 2020-02-25 NOTE — H&P ADULT - HISTORY OF PRESENT ILLNESS
60 y/o F PMH  alcohol abuse (per chart, PCP, patient denies), lyme disease, pyelonephritis GI bleed who presented to [facility] with complaint of 2 days of hematemesis/melena. Patient states she has not eaten much the past several days due to poor appetite and starting vomiting blood (streaks) multiple episodes, denies abdominal pain. Denies NSAID use. Reports endoscopy/colonoscopy 2.5 years ago revealing early gastric ulcer and same in Dec 2019, for which patient was started on an oral medication (doesn't know name) but has not been taking anything for at least 1 mo. Denies fever/chills at home, otherwise no chest pain, SOB, dysuria, hematuria, pain with defecation, straining    In the ED initial vital signs: 98.7, , /70, RR 18, 100 on RA.   Orthostatics: 98/64 (laying) , 86/52 standing  > received 3L NS and orthostatics: 100/60 (HR 99) laying, and standing 90/56    Labs significant for Hgb of 8.6 (consistent with baseline), plt 109, INR 1.31, lactate 2.4, K 3.0, , ALT 53   ED Course: Zofran 4mg, Pantoprozole 80 mg, 3L NS  Consults: GI

## 2020-02-25 NOTE — CHART NOTE - NSCHARTNOTEFT_GEN_A_CORE
Upon Nutritional Assessment by the Registered Dietitian your patient was determined to meet criteria / has evidence of the following diagnosis/diagnoses:          [ x ]  Mild Protein Calorie Malnutrition        [ ]  Moderate Protein Calorie Malnutrition        [ ] Severe Protein Calorie Malnutrition        [ ] Unspecified Protein Calorie Malnutrition        [ ] Underweight / BMI <19        [ ] Morbid Obesity / BMI > 40      Findings as based on:  •  Comprehensive nutrition assessment and consultation: suspect mild - mild wasting to buccal, +wt loss: wt from 2/25 of 115 pounds noted - suggest wt loss of ~5-10 pound wt loss (4-8% body wt loss), decreased PO intake (assume <50-75% in week preceding)          The following diet has been recommended: Diet to be advanced in 24-48hrs as medically feasible: clears -> full liquids -> soft low fiber GERD diet; suggest ensure clears as oral nutrition supplements x3 per day (240kcal/8gm prot). Monitor for Tolerance, %PO intake.      PROVIDER Section:     By signing this assessment you are acknowledging and agree with the diagnosis/diagnoses assigned by the Registered Dietitian    Comments:

## 2020-02-25 NOTE — H&P ADULT - NSHPLABSRESULTS_GEN_ALL_CORE
LABS:                        8.6    4.69  )-----------( 109      ( 2020 22:59 )             25.8         133  |  94<L>  |  37<H>  ----------------------------<  112<H>  3.0<L>   |  28  |  0.71    Ca    10.3      2020 22:59    TPro  7.4  /  Alb  3.8  /  TBili  2.0<H>  /  DBili  x   /  AST  106<H>  /  ALT  53<H>  /  AlkPhos  210<H>      PT/INR - ( 2020 22:59 )   PT: 14.5 sec;   INR: 1.30          PTT - ( 2020 22:59 )  PTT:29.6 sec  Urinalysis Basic - ( 2020 00:54 )    Color: Yellow / Appearance: Clear / S.010 / pH: x  Gluc: x / Ketone: 15 mg/dL  / Bili: Small / Urobili: 4.0 E.U./dL   Blood: x / Protein: NEGATIVE mg/dL / Nitrite: NEGATIVE   Leuk Esterase: NEGATIVE / RBC: < 5 /HPF / WBC 5-10 /HPF   Sq Epi: x / Non Sq Epi: 0-5 /HPF / Bacteria: Present /HPF      CAPILLARY BLOOD GLUCOSE          RADIOLOGY & ADDITIONAL TESTS: Reviewed.

## 2020-02-25 NOTE — DIETITIAN INITIAL EVALUATION ADULT. - PROBLEM SELECTOR PLAN 3
Patient has a history of alcohol abuse with cirrhosis.   - C/w with multivitamin, thiamine, folic acid   - Ativan for CIWA > 8   - Monitor for signs of Alcohol withdrawal

## 2020-02-25 NOTE — H&P ADULT - ASSESSMENT
60 year old with PMHX of previous GI bleed, alcohol abuse, presents for hemetemesis and melena for 2 days, admitted for evaluation of upper GI bleed.

## 2020-02-25 NOTE — H&P ADULT - ATTENDING COMMENTS
Ms. Main is a 61 y/o female with NAEEM dependency, GIB and PUD who presented to the [facility] with melena and blood tinged vomitus x 4 days.  She is unable to keep any food down.  She was orthostatic and received 3 L IVF bolus with BP now 100/60 mmHg, RR 18/min, ECH with sinus tachycardia.  She was admitted to the Step down unit.  A/P:  -Acute post hemorrhagic anemia  -GIB likely upper  -NAEEM dependency  -hypokalemia  -orthostatic hypotension  -liver cirrhosis  >monitor for signs of NAEEM withdrawal  >thiamine and folate  >Ativan prn if needed for withdrawal  >IVF  >replete potassium; check magnesium and phosphate  >ECG and trops  >NPO, c/w PPI, for Scope by GI in later this morning  >prophylaxis with ceftriaxone  >SCDs Ms. Avery is a 61 y/o female with NAEEM dependency, GIB and PUD who presented to the [facility] with melena and blood tinged vomitus x 4 days.  She is unable to keep any food down.  She was orthostatic and received 3 L IVF bolus with BP now 100/60 mmHg, RR 18/min, ECH with sinus tachycardia.  She was admitted to the Step down unit.  A/P:  -Acute post hemorrhagic anemia  -GIB likely upper  -NAEEM dependency  -hypokalemia  -orthostatic hypotension  -liver cirrhosis  >monitor for signs of NAEEM withdrawal  >thiamine and folate  >Ativan prn if needed for withdrawal  >IVF  >replete potassium; check magnesium and phosphate  >ECG and trops  >NPO, c/w PPI, for Scope by GI in later this morning  >prophylaxis with ceftriaxone  >SCDs

## 2020-02-25 NOTE — PROGRESS NOTE ADULT - PROBLEM SELECTOR PLAN 2
Patient has a history of GI bleed, and with alcohol abuse. Presented with melena, and hematemesis (Hgb stable).   - GI aware, will see in AM   - NPO at MN for possible EGD   - 2 large bore IV's   - Transfuse for Hgb < 7   - S/p Pantoprozole 80 mg   - C/w with Pantoprozole infusion   - S/p 3L NS and remains orthostatic abuse History of GI bleed in the setting of alcohol abuse. Presented with hematemesis with subsequent melena per HPI.    -Plan for EGD  -Management as above  -Follow up further GI recommendations

## 2020-02-25 NOTE — H&P ADULT - NSHPREVIEWOFSYSTEMS_GEN_ALL_CORE
REVIEW OF SYSTEMS:    CONSTITUTIONAL: No weakness, fevers or chills.   EYES/ENT: No visual changes;  No vertigo or throat pain   NECK: No pain or stiffness  RESPIRATORY: No cough, wheezing, hemoptysis; No shortness of breath  CARDIOVASCULAR: No chest pain or palpitations  GASTROINTESTINAL: No abdominal or epigastric pain. No nausea, vomiting, or hematemesis; No diarrhea or constipation. No melena or hematochezia.  GENITOURINARY: No dysuria, frequency or hematuria  NEUROLOGICAL: No numbness or weakness  SKIN: No itching, burning, rashes, or lesions   All other review of systems is negative unless indicated above.

## 2020-02-25 NOTE — PROGRESS NOTE ADULT - PROBLEM SELECTOR PLAN 1
Patient has a history of gastritis and alcohol abuse, now presents with 2 days of melena and hematemesis (blood streaked vomit). On presentation, Hgb 8.6/Hct of 25.8 (Discharged in 12/19 with Hgb of 7.9).   - S/p 3L NS   - No blood transfusions for now   - Trend CBC   - Maintain Active type and Screen   - Transfuse for Hgb < 7 History of gastritis and alcohol abuse. Presented with 2 days of hematesis that progressed to melena. Hgb 8.6 on admission which is improved from baseline of 7 but this morning her hemoglobin was 5.7.    -Ordered for 2 units PRBC  -Maintain IV access  -Post-transfusion CBC  -Contie Protonix drip  -Continue Octreotide drip  -GI following, plan for EGD

## 2020-02-25 NOTE — DIETITIAN INITIAL EVALUATION ADULT. - OTHER INFO
60 y/o F PMH of alcohol abuse (per chart, PCP, patient denies), lyme disease, pyelonephritis, GI bleed, endoscopy/colonoscopy 2.5 years ago revealing early gastric ulcer and same in Dec 2019, pt presents for hemetemesis and melena for 2 days, admitted for evaluation of upper GI bleed. No pain per flow sheets. No edema/pressure ulcers.   Pt is NPO @ this time.   Please see below for nutritions recommendations. 60 y/o F PMH of alcohol abuse (per chart, PCP, patient denies), lyme disease, pyelonephritis, GI bleed, endoscopy/colonoscopy 2.5 years ago revealing early gastric ulcer and same in Dec 2019, pt presents for hemetemesis and melena for 2 days, admitted for evaluation of upper GI bleed. No pain per flow sheets. No edema/pressure ulcers.   Pt reports PO intake will Vary PTA d/t vomiting - per H&P "has not eaten much the past several days due to poor appetite." Reports she is on regular diet, tends to eat at friends home, does not take oral nutrition supplements, No issues chewing/swallowing, NKFA. Pt is NPO @ this time- reports she is hungry. -125 pounds and reports wt loss, pt reports PTA weighing ~105 pounds which would suggest wt loss of ~15-20 pounds; wt from 2/25 of 115 pounds noted - suggest wt loss of ~5-10 pound wt loss (4-8% body wt loss), pt unable to determine timeframe for wt loss. Attempted NFPE However limited d/t pt ability to participate, pt is noted with mild wasting to buccal region.   Education: Discussed gastric irritants - handout from [facility] on GERD diet provided. Discussed oral nutrition supplements. Understanding stated, provide additional motivation as needed.   Please see below for nutritions recommendations.

## 2020-02-25 NOTE — DIETITIAN INITIAL EVALUATION ADULT. - MALNUTRITION
suspect mild - mild wasting to buccal, +wt loss: wt from 2/25 of 115 pounds noted - suggest wt loss of ~5-10 pound wt loss (4-8% body wt loss), decreased PO intake (assume <50-75% in week preceding) suspected mild

## 2020-02-25 NOTE — PROGRESS NOTE ADULT - ASSESSMENT
60 year old with PMHX of previous GI bleed, alcohol abuse, presents for hemetemesis and melena for 2 days, admitted for evaluation of upper GI bleed. 60-year-old female with a past medical history of alcohol abuse and a prior GI bleed who presented with a 2-day history of hematemesis and melena. Hgb 8.6 on admission so admitted for GI bleed.

## 2020-02-25 NOTE — DIETITIAN INITIAL EVALUATION ADULT. - ADD RECOMMEND
Monitor Skin, Labs, wts, GI distress, GOC. Recommend MVI daily; EMR ordered noted. Additional micronutrients per team. Recs made with team. RD to remain available for additional nutrition interventions as needed.

## 2020-02-25 NOTE — PROGRESS NOTE ADULT - PROBLEM SELECTOR PLAN 6
Patient presented with low platelets to 105, likely 2/2 alcohol abuse   - Trend CBC Patient presented with low platelets to 105, likely 2/2 alcohol abuse     -Monitor platelets on CBC

## 2020-02-25 NOTE — H&P ADULT - PROBLEM SELECTOR PLAN 2
Patient has a history of GI bleed, and with alcohol abuse. Presented with melena, and hematemesis (Hgb stable).   - GI aware, will see in AM   - NPO at MN for possible EGD   - 2 large bore IV's   - Transfuse for Hgb < 7   - S/p Pantoprozole 80 mg   - C/w with Pantoprozole infusion   - S/p 3L NS and remains orthostatic abuse

## 2020-02-25 NOTE — H&P ADULT - NSHPPHYSICALEXAM_GEN_ALL_CORE
VITAL SIGNS:  T(C): 36.9 (02-25-20 @ 02:49), Max: 37.1 (02-24-20 @ 22:39)  T(F): 98.5 (02-25-20 @ 02:49), Max: 98.7 (02-24-20 @ 22:39)  HR: 110 (02-25-20 @ 00:31) (110 - 120)  BP: 106/78 (02-25-20 @ 00:31) (106/78 - 108/70)  BP(mean): --  RR: 18 (02-25-20 @ 02:49) (17 - 18)  SpO2: 114% (02-25-20 @ 02:49) (97% - 114%)  Wt(kg): --    PHYSICAL EXAM:  Constitutional: WDWN resting comfortably in bed; NAD  Head: NC/AT  Eyes: PERRL, EOMI, anicteric sclera  ENT: no nasal discharge; uvula midline, no oropharyngeal erythema or exudates; MMM  Neck: supple; no JVD or thyromegaly  Respiratory: CTA B/L; no W/R/R, no retractions  Cardiac: +S1/S2; RRR; no M/R/G; PMI non-displaced  Gastrointestinal: soft, NT/ND; no rebound or guarding; +BSx4  Genitourinary: normal external genitalia  Back: spine midline, no bony tenderness or step-offs; no CVAT B/L  Extremities: WWP, no clubbing or cyanosis; no peripheral edema  Musculoskeletal: NROM x4; no joint swelling, tenderness or erythema  Vascular: 2+ radial, femoral, DP/PT pulses B/L  Dermatologic: skin warm, dry and intact; no rashes, wounds, or scars  Lymphatic: no submandibular or cervical LAD  Neurologic: AAOx3; CNII-XII grossly intact; no focal deficits  Psychiatric: affect and characteristics of appearance, verbalizations, behaviors are appropriate PHYSICAL EXAM:  General: In no acute distress, resting comfortably in bed  HEENT: NCAT, PERRL, EOMI, mild conjunctival pallor or scleral icterus, MMM  Neck: Supple, no JVD  Respiratory: Clear to auscultation bilaterally with no wheezes, rales, or rhonchi appreciated  Cardiovascular: RRR, normal S1 and S2, no murmurs, rubs, or gallops appreciated  Vascular: 2+ radial and DP pulses  Abdomen: Soft, NT/ND. Bowel sounds present in all four quadrants with no guarding, rebound tenderness, or palpable masses  Extremities: Warm and well perfused. No clubbing, cyanosis, or edema noted  Skin: No gross skin abnormalities or rashes noted  Neuro: Patient is AAOx3 with fluent speech

## 2020-02-25 NOTE — ED PROVIDER NOTE - OBJECTIVE STATEMENT
60 y/o F PMH possible alcohol abuse (per chart, PCP, patient denies), lyme disease, pyelonephritis GI bleed who presented to [facility] with complaint of 2 days of hematemesis/melena. Patient states she has not eaten much the past several days due to poor appetite and starting vomiting blood, multiple episodes, denies abdominal pain. Denies NSAID use. Reports endoscopy/colonoscopy 2.5 years ago revealing early gastric ulcer and same in dec 2019, for which patient was started on an oral medication (doesn't know name) but has not been taking anything for at least 1 mo. Denies fever/chills at home, otherwise no chest pain, SOB, dysuria, hematuria, pain with defecation, straining.

## 2020-02-25 NOTE — DIETITIAN INITIAL EVALUATION ADULT. - ENERGY NEEDS
5'4''  pounds +/-10%  (2/18) 115 pounds; %IBW=96% BMI 19.8  current body wt used for energy calculations as pt falls within % IBW  adjusted for suspected malnutrition, fluids per team, recommend upper-end of protein needs

## 2020-02-25 NOTE — PROGRESS NOTE ADULT - PROBLEM SELECTOR PLAN 3
Patient has a history of alcohol abuse with cirrhosis.   - C/w with multivitamin, thiamine, folic acid   - Ativan for CIWA > 8   - Monitor for signs of Alcohol withdrawal History of alcohol abuse with cirrhosis history    -Continue Folic Acid 1mg QD  -Continue Multivitamin QD  -Continue Thiamine 100mg QD  -CIWA

## 2020-02-25 NOTE — PROGRESS NOTE ADULT - PROBLEM SELECTOR PLAN 8
1.       PCP Contacted on Admission: (Y/N) --> Name & Phone #:  2.       Date of Contact with PCP:  3.       PCP Contacted at Discharge: (Y/N)  4.       Summary of Handoff Given to PCP:  5.       Post-Discharge Appointment Date and Location: [facility]

## 2020-02-25 NOTE — ED PROVIDER NOTE - ATTENDING CONTRIBUTION TO CARE
patient with multiple medical problems, pw upper gi bleed, hd stable, and stable for admission and transfer.

## 2020-02-25 NOTE — H&P ADULT - PROBLEM SELECTOR PLAN 1
Patient has a history of gastritis and alcohol abuse, now presents with 2 days of melena and hematemesis (blood streaked vomit). On presentation, Hgb 8.6/Hct of 25.8 (Discharged in 12/19 with Hgb of 7.9).   - S/p 3L NS   - No blood transfusions for now   - Trend CBC   - Maintain Active type and Screen   - Transfuse for Hgb < 7

## 2020-02-25 NOTE — CONSULT NOTE ADULT - SUBJECTIVE AND OBJECTIVE BOX
HPI:  60 YO F h/o EtOH abuse, EtOH cirrhosis, lyme disease, p/w hematemesis and melena x 3 days. Pt also reports associated nausea, generalized weakness, dizziness, intermittent RUQ abdominal pain. Denies fever, chills, CP, SOB, NSAID use, AC use, herbal supplement/tea use. Pt reports similar symptoms in the past, last in December 2019 where she was admitted to [facility] and told that she had liver cirrhosis. Since then she has decreased her EtOH intake, last drank 1 glass of wine about 1 week ago, previously drank 1 bottle of wine daily.     EGD 12/4/19: Small hiatal hernia, portal hypertensive gastropathy, 1-2 cm submucosal mass in the fundus, duodenitis  Colonoscopy 12/5/19: Moderate diverticulosis, two 3-5 mm sessile polyps at the hepatic flexure s/p polypectomy, 5-9 mm descending colon pedunculated polyp    Allergies    Cipro (Hives; Urticaria)    Home Medications: None    MEDICATIONS:  MEDICATIONS  (STANDING):  cefTRIAXone   IVPB 1000 milliGRAM(s) IV Intermittent every 24 hours  folic acid 1 milliGRAM(s) Oral daily  lactated ringers. 1000 milliLiter(s) (90 mL/Hr) IV Continuous <Continuous>  multivitamin 1 Tablet(s) Oral daily  octreotide  Infusion 50 MICROgram(s)/Hr (10 mL/Hr) IV Continuous <Continuous>  octreotide  Injectable 50 MICROGram(s) IV Push once  pantoprazole Infusion 8 mG/Hr (10 mL/Hr) IV Continuous <Continuous>  potassium chloride  10 mEq/100 mL IVPB 10 milliEquivalent(s) IV Intermittent every 1 hour  thiamine 100 milliGRAM(s) Oral daily    MEDICATIONS  (PRN):  LORazepam     Tablet 2 milliGRAM(s) Oral every 2 hours PRN CIWA-Ar score 8 or greater    PAST MEDICAL & SURGICAL HISTORY:  Pyelonephritis  Gastritis  Lyme disease  R arm surgery, knee surgery  FAMILY HISTORY:  No pertinent family history in first degree relatives    SOCIAL HISTORY:  [place]:  Denies  Alcohol: Prior 1 bottle of wine daily, currently glass of wine occasionally  Illicit Drugs: Denies    REVIEW OF SYSTEMS:  All other 10 review of systems is negative unless indicated above.    Vital Signs Last 24 Hrs  T(C): 36.9 (25 Feb 2020 11:50), Max: 37.1 (24 Feb 2020 22:39)  T(F): 98.4 (25 Feb 2020 11:50), Max: 98.7 (24 Feb 2020 22:39)  HR: 93 (25 Feb 2020 11:50) (93 - 120)  BP: 90/51 (25 Feb 2020 11:50) (86/53 - 108/70)  BP(mean): 65 (25 Feb 2020 11:50) (64 - 66)  RR: 18 (25 Feb 2020 11:50) (16 - 18)  SpO2: 100% (25 Feb 2020 11:50) (97% - 114%)    02-24 @ 07:01  -  02-25 @ 07:00  --------------------------------------------------------  IN: 0 mL / OUT: 300 mL / NET: -300 mL        PHYSICAL EXAM:    General: Frail; in no acute distress  Eyes: Anicteric sclerae, moist conjunctivae  HENT: Moist mucous membranes  Neck: Trachea midline, supple  Lungs: Normal respiratory effort and no intercostal retractions  Cardiovascular: RRR  Abdomen: Soft, non-tender non-distended; Normal bowel sounds; No rebound or guarding  Extremities: Normal range of motion, No clubbing, cyanosis or edema  Neurological: Alert and oriented x3. No asterixis  Skin: Warm and dry. No obvious rash    LABS:                        5.7    3.35  )-----------( 73       ( 25 Feb 2020 07:17 )             17.6     02-25    137  |  104  |  29<H>  ----------------------------<  86  3.3<L>   |  22  |  0.49<L>    Ca    8.7      25 Feb 2020 07:17  Phos  1.6     02-25  Mg     1.3     02-25    TPro  5.3<L>  /  Alb  3.3  /  TBili  1.4<H>  /  DBili  x   /  AST  65<H>  /  ALT  29  /  AlkPhos  143<H>  02-25    PT/INR - ( 25 Feb 2020 07:17 )   PT: 16.2 sec;   INR: 1.41        PTT - ( 25 Feb 2020 07:17 )  PTT:31.1 sec    RADIOLOGY & ADDITIONAL STUDIES:  CXR 2/24: No focal infiltrates

## 2020-02-25 NOTE — DIETITIAN INITIAL EVALUATION ADULT. - DIET TYPE
Diet to be advanced in 24-48hrs as medically feasible: clears -> full liquids -> soft low fiber GERD diet; suggest ensure clears as oral nutrition supplements x3 per day (240kcal/8gm prot). Monitor for Tolerance, %PO intake.

## 2020-02-25 NOTE — H&P ADULT - PROBLEM SELECTOR PLAN 5
Patient presented with AST of 106, and ALT 53 likely 2/2 alcohol abuse.   - Trend CMP   - GI aware   - Education on alcohol cessation

## 2020-02-25 NOTE — ED ADULT NURSE REASSESSMENT NOTE - NS ED NURSE REASSESS COMMENT FT1
Pt states that she is feeling better at this time. Pt is waiting to be admitted to [facility]. Pt was positive for orthostatics, will assist when OOB. HR 90-110s, RR 16 unlabored. Pt continues to receive pantoprazole 8mg/hr. Will continue to monitor until pt is admitted. Side rails up, safety is maintained.

## 2020-02-26 ENCOUNTER — RESULT REVIEW (OUTPATIENT)
Age: 60
End: 2020-02-26

## 2020-02-26 LAB
ANION GAP SERPL CALC-SCNC: 13 MMOL/L — SIGNIFICANT CHANGE UP (ref 5–17)
BASOPHILS # BLD AUTO: 0.02 K/UL — SIGNIFICANT CHANGE UP (ref 0–0.2)
BASOPHILS NFR BLD AUTO: 0.9 % — SIGNIFICANT CHANGE UP (ref 0–2)
BUN SERPL-MCNC: 15 MG/DL — SIGNIFICANT CHANGE UP (ref 7–23)
CALCIUM SERPL-MCNC: 8.5 MG/DL — SIGNIFICANT CHANGE UP (ref 8.4–10.5)
CHLORIDE SERPL-SCNC: 104 MMOL/L — SIGNIFICANT CHANGE UP (ref 96–108)
CO2 SERPL-SCNC: 20 MMOL/L — LOW (ref 22–31)
CREAT SERPL-MCNC: 0.6 MG/DL — SIGNIFICANT CHANGE UP (ref 0.5–1.3)
EOSINOPHIL # BLD AUTO: 0.09 K/UL — SIGNIFICANT CHANGE UP (ref 0–0.5)
EOSINOPHIL NFR BLD AUTO: 3.8 % — SIGNIFICANT CHANGE UP (ref 0–6)
GLUCOSE SERPL-MCNC: 91 MG/DL — SIGNIFICANT CHANGE UP (ref 70–99)
HCT VFR BLD CALC: 24.3 % — LOW (ref 34.5–45)
HCV AB S/CO SERPL IA: 0.09 S/CO — SIGNIFICANT CHANGE UP
HCV AB SERPL-IMP: SIGNIFICANT CHANGE UP
HGB BLD-MCNC: 8.4 G/DL — LOW (ref 11.5–15.5)
IMM GRANULOCYTES NFR BLD AUTO: 0 % — SIGNIFICANT CHANGE UP (ref 0–1.5)
LYMPHOCYTES # BLD AUTO: 0.7 K/UL — LOW (ref 1–3.3)
LYMPHOCYTES # BLD AUTO: 29.8 % — SIGNIFICANT CHANGE UP (ref 13–44)
MAGNESIUM SERPL-MCNC: 1.6 MG/DL — SIGNIFICANT CHANGE UP (ref 1.6–2.6)
MCHC RBC-ENTMCNC: 32.6 PG — SIGNIFICANT CHANGE UP (ref 27–34)
MCHC RBC-ENTMCNC: 34.6 GM/DL — SIGNIFICANT CHANGE UP (ref 32–36)
MCV RBC AUTO: 94.2 FL — SIGNIFICANT CHANGE UP (ref 80–100)
MONOCYTES # BLD AUTO: 0.27 K/UL — SIGNIFICANT CHANGE UP (ref 0–0.9)
MONOCYTES NFR BLD AUTO: 11.5 % — SIGNIFICANT CHANGE UP (ref 2–14)
NEUTROPHILS # BLD AUTO: 1.27 K/UL — LOW (ref 1.8–7.4)
NEUTROPHILS NFR BLD AUTO: 54 % — SIGNIFICANT CHANGE UP (ref 43–77)
NRBC # BLD: 0 /100 WBCS — SIGNIFICANT CHANGE UP (ref 0–0)
PLATELET # BLD AUTO: 62 K/UL — LOW (ref 150–400)
POTASSIUM SERPL-MCNC: 3.7 MMOL/L — SIGNIFICANT CHANGE UP (ref 3.5–5.3)
POTASSIUM SERPL-SCNC: 3.7 MMOL/L — SIGNIFICANT CHANGE UP (ref 3.5–5.3)
RBC # BLD: 2.58 M/UL — LOW (ref 3.8–5.2)
RBC # FLD: 16.6 % — HIGH (ref 10.3–14.5)
SODIUM SERPL-SCNC: 137 MMOL/L — SIGNIFICANT CHANGE UP (ref 135–145)
WBC # BLD: 2.35 K/UL — LOW (ref 3.8–10.5)
WBC # FLD AUTO: 2.35 K/UL — LOW (ref 3.8–10.5)

## 2020-02-26 PROCEDURE — 88305 TISSUE EXAM BY PATHOLOGIST: CPT | Mod: 26

## 2020-02-26 PROCEDURE — 99233 SBSQ HOSP IP/OBS HIGH 50: CPT | Mod: GC

## 2020-02-26 PROCEDURE — 43235 EGD DIAGNOSTIC BRUSH WASH: CPT

## 2020-02-26 RX ORDER — PANTOPRAZOLE SODIUM 20 MG/1
40 TABLET, DELAYED RELEASE ORAL EVERY 12 HOURS
Refills: 0 | Status: DISCONTINUED | OUTPATIENT
Start: 2020-02-26 | End: 2020-02-27

## 2020-02-26 RX ORDER — MAGNESIUM SULFATE 500 MG/ML
4 VIAL (ML) INJECTION ONCE
Refills: 0 | Status: COMPLETED | OUTPATIENT
Start: 2020-02-26 | End: 2020-02-26

## 2020-02-26 RX ORDER — INFLUENZA VIRUS VACCINE 15; 15; 15; 15 UG/.5ML; UG/.5ML; UG/.5ML; UG/.5ML
0.5 SUSPENSION INTRAMUSCULAR ONCE
Refills: 0 | Status: DISCONTINUED | OUTPATIENT
Start: 2020-02-26 | End: 2020-02-27

## 2020-02-26 RX ORDER — MULTIVIT WITH MIN/MFOLATE/K2 340-15/3 G
1 POWDER (GRAM) ORAL ONCE
Refills: 0 | Status: COMPLETED | OUTPATIENT
Start: 2020-02-26 | End: 2020-02-26

## 2020-02-26 RX ORDER — OXYCODONE HYDROCHLORIDE 5 MG/1
5 TABLET ORAL ONCE
Refills: 0 | Status: DISCONTINUED | OUTPATIENT
Start: 2020-02-26 | End: 2020-02-26

## 2020-02-26 RX ORDER — LANOLIN ALCOHOL/MO/W.PET/CERES
5 CREAM (GRAM) TOPICAL AT BEDTIME
Refills: 0 | Status: DISCONTINUED | OUTPATIENT
Start: 2020-02-26 | End: 2020-02-27

## 2020-02-26 RX ORDER — POTASSIUM CHLORIDE 20 MEQ
10 PACKET (EA) ORAL
Refills: 0 | Status: COMPLETED | OUTPATIENT
Start: 2020-02-26 | End: 2020-02-26

## 2020-02-26 RX ORDER — MULTIVIT WITH MIN/MFOLATE/K2 340-15/3 G
1 POWDER (GRAM) ORAL ONCE
Refills: 0 | Status: COMPLETED | OUTPATIENT
Start: 2020-02-27 | End: 2020-02-27

## 2020-02-26 RX ORDER — POTASSIUM CHLORIDE 20 MEQ
40 PACKET (EA) ORAL ONCE
Refills: 0 | Status: DISCONTINUED | OUTPATIENT
Start: 2020-02-26 | End: 2020-02-26

## 2020-02-26 RX ORDER — ACETAMINOPHEN 500 MG
650 TABLET ORAL EVERY 6 HOURS
Refills: 0 | Status: DISCONTINUED | OUTPATIENT
Start: 2020-02-26 | End: 2020-02-27

## 2020-02-26 RX ORDER — SOD SULF/SODIUM/NAHCO3/KCL/PEG
4000 SOLUTION, RECONSTITUTED, ORAL ORAL ONCE
Refills: 0 | Status: DISCONTINUED | OUTPATIENT
Start: 2020-02-26 | End: 2020-02-26

## 2020-02-26 RX ADMIN — Medication 1 TABLET(S): at 13:48

## 2020-02-26 RX ADMIN — Medication 100 MILLIEQUIVALENT(S): at 14:07

## 2020-02-26 RX ADMIN — Medication 5 MILLIGRAM(S): at 23:22

## 2020-02-26 RX ADMIN — Medication 1 BOTTLE: at 20:07

## 2020-02-26 RX ADMIN — Medication 100 GRAM(S): at 08:51

## 2020-02-26 RX ADMIN — CEFTRIAXONE 100 MILLIGRAM(S): 500 INJECTION, POWDER, FOR SOLUTION INTRAMUSCULAR; INTRAVENOUS at 08:54

## 2020-02-26 RX ADMIN — Medication 1 MILLIGRAM(S): at 13:48

## 2020-02-26 RX ADMIN — OXYCODONE HYDROCHLORIDE 5 MILLIGRAM(S): 5 TABLET ORAL at 23:22

## 2020-02-26 RX ADMIN — Medication 100 MILLIEQUIVALENT(S): at 15:01

## 2020-02-26 RX ADMIN — OCTREOTIDE ACETATE 10 MICROGRAM(S)/HR: 200 INJECTION, SOLUTION INTRAVENOUS; SUBCUTANEOUS at 00:00

## 2020-02-26 RX ADMIN — PANTOPRAZOLE SODIUM 40 MILLIGRAM(S): 20 TABLET, DELAYED RELEASE ORAL at 18:03

## 2020-02-26 RX ADMIN — Medication 100 MILLIEQUIVALENT(S): at 12:46

## 2020-02-26 RX ADMIN — Medication 100 MILLIGRAM(S): at 13:48

## 2020-02-26 NOTE — PROGRESS NOTE ADULT - PROBLEM SELECTOR PLAN 2
History of GI bleed in the setting of alcohol abuse. Presented with hematemesis with subsequent melena per HPI.    - s/p EGD as above, plan for pill endoscopy in AM  -Management as above  -Follow up further GI recommendations

## 2020-02-26 NOTE — PROGRESS NOTE ADULT - PROBLEM SELECTOR PLAN 7
F: None  E: Replete for K<4.0 and Mg< 2.0   N: NPO after MN for pill endoscopy   DVT: SCD  Dispo: [facility]

## 2020-02-26 NOTE — PROGRESS NOTE ADULT - ATTENDING COMMENTS
Patient seen and examined with house-staff during bedside rounds.  Resident note read, including vitals, physical findings, laboratory data, and radiological reports.   Revisions included below.  Direct personal management at bed side and extensive interpretation of the data.  Plan was outlined and discussed in details with the housestaff.  Decision making of high complexity  Action taken for acute disease activity to reflect the level of care provided:  - medication reconciliation  - review laboratory data  Patient is clinically stable. Hemoglobin is stable. Patient had endoscopy results were reviewed. Follow CBC. On DVT prophylaxis. Patient was evaluated for transfer to regular floor. Follow up with GI
Patient seen and examined with house-staff during bedside rounds.  Resident note read, including vitals, physical findings, laboratory data, and radiological reports.   Revisions included below.  Direct personal management at bed side and extensive interpretation of the data.  Plan was outlined and discussed in details with the housestaff.  Decision making of high complexity  Action taken for acute disease activity to reflect the level of care provided:  - medication reconciliation  - review laboratory data

## 2020-02-26 NOTE — PROGRESS NOTE ADULT - PROBLEM SELECTOR PLAN 7
F: None  E: Replete for K<4.0 and Mg< 2.0   N: NPO for possible EGD   DVT: SCD  Dispo: 7L F: None  E: Replete for K<4.0 and Mg< 2.0   N: NPO for EGD  DVT: SCD  Dispo: 7L

## 2020-02-26 NOTE — PROGRESS NOTE ADULT - ASSESSMENT
60-year-old female with a past medical history of alcohol abuse and a prior GI bleed who presented with a 2-day history of hematemesis and melena. Hgb 8.6 on admission so admitted for GI bleed.

## 2020-02-26 NOTE — PATIENT PROFILE ADULT - BRADEN FRICTION AND SHEAR
[facility]  Char Perez MD  Dermatology  [phone]    WOUND CARE INSTRUCTIONS      Begin Wednesday    1. Cleanse wound gently with tap water and Q-tip once daily.  If there is drainage, remove as much as possible.   2. Apply Vaseline-do not let the wound dry out.  Reapply Vaseline as needed.    3.  Cover the wound with a clean Band-Aid or non-stick dressing, unless instructed differently.    4.  If there is any oozing or bleeding, apply firm pressure for 5 minutes or more.      Please call the office if you have any concerns.         
(3) no apparent problem

## 2020-02-26 NOTE — PROGRESS NOTE ADULT - PROBLEM SELECTOR PLAN 6
Patient presented with low platelets to 105, likely 2/2 alcohol abuse     -Monitor platelets on CBC Patient presented with low platelets to 105, likely 2/2 alcohol abuse     -Platelets 60 today 2/26  -Monitor platelets on CBC

## 2020-02-26 NOTE — PROGRESS NOTE ADULT - SUBJECTIVE AND OBJECTIVE BOX
OVERNIGHT EVENTS: None    SUBJECTIVE: Patient seen and examined at the bedside. She feels better this morning but is very hungry    Vital Signs Last 12 Hrs  T(F): 99.2 (20 @ 09:24), Max: 99.2 (20 @ 09:24)  HR: 87 (20 @ :31) (87 - 89)  BP: 121/76 (20 @ 09:31) (111/75 - 121/76)  BP(mean): 94 (20 @ :31) (88 - 94)  RR: 18 (20 @ 09:31) (14 - 18)  SpO2: 99% (20 @ :31) (98% - 99%)  I&O's Summary    2020 07:  -  2020 07:00  --------------------------------------------------------  IN: 990 mL / OUT: 600 mL / NET: 390 mL    2020 07:  -  2020 10:22  --------------------------------------------------------  IN: 220 mL / OUT: 400 mL / NET: -180 mL        PHYSICAL EXAM:  General: In no acute distress, resting comfortably in bed  HEENT: NCAT, PERRL, EOMI, mild conjunctival pallor, MMM  Neck: Supple, no JVD  Respiratory: Clear to auscultation bilaterally  Cardiovascular: RRR, normal S1 and S2  Vascular: 2+ radial and DP pulses  Abdomen: Soft, NT/ND. Bowel sounds present in all four quadrantss  Extremities: Warm and well perfused. No clubbing, cyanosis, or edema noted  Skin: No gross skin abnormalities or rashes noted  Neuro: AAOx3 with no cranial nerve deficits. Strength and sensation intact throughout.    LABS:                        8.4    2.35  )-----------( 62       ( 2020 06:51 )             24.3         137  |  104  |  15  ----------------------------<  91  3.7   |  20<L>  |  0.60    Ca    8.5      2020 06:51  Phos  1.6       Mg     1.6         TPro  5.3<L>  /  Alb  3.3  /  TBili  1.4<H>  /  DBili  x   /  AST  65<H>  /  ALT  29  /  AlkPhos  143<H>      PT/INR - ( 2020 07:17 )   PT: 16.2 sec;   INR: 1.41          PTT - ( 2020 07:17 )  PTT:31.1 sec  Urinalysis Basic - ( 2020 00:54 )    Color: Yellow / Appearance: Clear / S.010 / pH: x  Gluc: x / Ketone: 15 mg/dL  / Bili: Small / Urobili: 4.0 E.U./dL   Blood: x / Protein: NEGATIVE mg/dL / Nitrite: NEGATIVE   Leuk Esterase: NEGATIVE / RBC: < 5 /HPF / WBC 5-10 /HPF   Sq Epi: x / Non Sq Epi: 0-5 /HPF / Bacteria: Present /HPF        RADIOLOGY & ADDITIONAL TESTS: Reviewed.    MEDICATIONS  (STANDING):  cefTRIAXone   IVPB 1000 milliGRAM(s) IV Intermittent every 24 hours  folic acid 1 milliGRAM(s) Oral daily  lactated ringers. 1000 milliLiter(s) (90 mL/Hr) IV Continuous <Continuous>  multivitamin 1 Tablet(s) Oral daily  octreotide  Infusion 50 MICROgram(s)/Hr (10 mL/Hr) IV Continuous <Continuous>  pantoprazole Infusion 8 mG/Hr (10 mL/Hr) IV Continuous <Continuous>  potassium chloride  10 mEq/100 mL IVPB 10 milliEquivalent(s) IV Intermittent every 1 hour  thiamine 100 milliGRAM(s) Oral daily    MEDICATIONS  (PRN):  LORazepam     Tablet 2 milliGRAM(s) Oral every 2 hours PRN CIWA-Ar score 8 or greater      Allergies    Cipro (Hives; Urticaria)    Intolerances PGY-1 TRANSFER NOTE - 7LA TO [facility]    HOSPITAL COURSE  Ms. Avery is a 60-year-old female with a past medical history of alcohol abuse and a prior GI bleed who presented with a 2-day history of hematemesis and melena. Hgb 8.6 on admission so admitted for GI bleed. While on 7LA her hemoglobin dropped to 5.7 and she received 2 units PRBC with good response. She underwent EGD which showed ________________. She has remained hemodynamically stable and can be stepped down to [facility] for further management.     OVERNIGHT EVENTS: None    SUBJECTIVE: Patient seen and examined at the bedside. She feels better this morning but is very hungry    Vital Signs Last 12 Hrs  T(F): 99.2 (20 @ 09:24), Max: 99.2 (20 @ 09:24)  HR: 87 (20 @ 09:31) (87 - 89)  BP: 121/76 (20 @ 09:31) (111/75 - 121/76)  BP(mean): 94 (20 @ 09:31) (88 - 94)  RR: 18 (20 @ 09:31) (14 - 18)  SpO2: 99% (20 @ 09:31) (98% - 99%)  I&O's Summary    2020 07:  -  2020 07:00  --------------------------------------------------------  IN: 990 mL / OUT: 600 mL / NET: 390 mL    2020 07:  -  2020 10:22  --------------------------------------------------------  IN: 220 mL / OUT: 400 mL / NET: -180 mL        PHYSICAL EXAM:  General: In no acute distress, resting comfortably in bed  HEENT: NCAT, PERRL, EOMI, mild conjunctival pallor, MMM  Neck: Supple, no JVD  Respiratory: Clear to auscultation bilaterally  Cardiovascular: RRR, normal S1 and S2  Vascular: 2+ radial and DP pulses  Abdomen: Soft, NT/ND. Bowel sounds present in all four quadrantss  Extremities: Warm and well perfused. No clubbing, cyanosis, or edema noted  Skin: No gross skin abnormalities or rashes noted  Neuro: AAOx3 with no cranial nerve deficits. Strength and sensation intact throughout.    LABS:                        8.4    2.35  )-----------( 62       ( 2020 06:51 )             24.3     02-    137  |  104  |  15  ----------------------------<  91  3.7   |  20<L>  |  0.60    Ca    8.5      2020 06:51  Phos  1.6     -  Mg     1.6         TPro  5.3<L>  /  Alb  3.3  /  TBili  1.4<H>  /  DBili  x   /  AST  65<H>  /  ALT  29  /  AlkPhos  143<H>  02-25    PT/INR - ( 2020 07:17 )   PT: 16.2 sec;   INR: 1.41          PTT - ( 2020 07:17 )  PTT:31.1 sec  Urinalysis Basic - ( 2020 00:54 )    Color: Yellow / Appearance: Clear / S.010 / pH: x  Gluc: x / Ketone: 15 mg/dL  / Bili: Small / Urobili: 4.0 E.U./dL   Blood: x / Protein: NEGATIVE mg/dL / Nitrite: NEGATIVE   Leuk Esterase: NEGATIVE / RBC: < 5 /HPF / WBC 5-10 /HPF   Sq Epi: x / Non Sq Epi: 0-5 /HPF / Bacteria: Present /HPF        RADIOLOGY & ADDITIONAL TESTS: Reviewed.    MEDICATIONS  (STANDING):  cefTRIAXone   IVPB 1000 milliGRAM(s) IV Intermittent every 24 hours  folic acid 1 milliGRAM(s) Oral daily  lactated ringers. 1000 milliLiter(s) (90 mL/Hr) IV Continuous <Continuous>  multivitamin 1 Tablet(s) Oral daily  octreotide  Infusion 50 MICROgram(s)/Hr (10 mL/Hr) IV Continuous <Continuous>  pantoprazole Infusion 8 mG/Hr (10 mL/Hr) IV Continuous <Continuous>  potassium chloride  10 mEq/100 mL IVPB 10 milliEquivalent(s) IV Intermittent every 1 hour  thiamine 100 milliGRAM(s) Oral daily    MEDICATIONS  (PRN):  LORazepam     Tablet 2 milliGRAM(s) Oral every 2 hours PRN CIWA-Ar score 8 or greater      Allergies    Cipro (Hives; Urticaria)    Intolerances PGY-1 TRANSFER NOTE - 7LA TO [facility]    HOSPITAL COURSE  Ms. Avery is a 60-year-old female with a past medical history of alcohol abuse and a prior GI bleed who presented with a 2-day history of hematemesis and melena. Hgb 8.6 on admission so admitted for GI bleed. While on 7LA her hemoglobin dropped to 5.7 and she received 2 units PRBC with good response. She underwent EGD which showed a small hiatal hernia, moderate portal hypertensive gastropathy, and atrophic mucosa in the duodenal bulb. She has remained hemodynamically stable and can be stepped down to [facility] for further management pending colonoscopy with GI tomorrow.    OVERNIGHT EVENTS: None    SUBJECTIVE: Patient seen and examined at the bedside. She feels better this morning but is very hungry    Vital Signs Last 12 Hrs  T(F): 99.2 (20 @ 09:24), Max: 99.2 (20 @ 09:24)  HR: 87 (20 @ 09:31) (87 - 89)  BP: 121/76 (20 @ 09:31) (111/75 - 121/76)  BP(mean): 94 (20 @ 09:31) (88 - 94)  RR: 18 (20 @ 09:31) (14 - 18)  SpO2: 99% (20 @ 09:31) (98% - 99%)  I&O's Summary      -  2020 07:00  --------------------------------------------------------  IN: 990 mL / OUT: 600 mL / NET: 390 mL      -  2020 10:22  --------------------------------------------------------  IN: 220 mL / OUT: 400 mL / NET: -180 mL        PHYSICAL EXAM:  General: In no acute distress, resting comfortably in bed  HEENT: NCAT, PERRL, EOMI, mild conjunctival pallor, MMM  Neck: Supple, no JVD  Respiratory: Clear to auscultation bilaterally  Cardiovascular: RRR, normal S1 and S2  Vascular: 2+ radial and DP pulses  Abdomen: Soft, NT/ND. Bowel sounds present in all four quadrantss  Extremities: Warm and well perfused. No clubbing, cyanosis, or edema noted  Skin: No gross skin abnormalities or rashes noted  Neuro: AAOx3 with no cranial nerve deficits. Strength and sensation intact throughout.    LABS:                        8.4    2.35  )-----------( 62       ( 2020 06:51 )             24.3     02-    137  |  104  |  15  ----------------------------<  91  3.7   |  20<L>  |  0.60    Ca    8.5      2020 06:51  Phos  1.6       Mg     1.6         TPro  5.3<L>  /  Alb  3.3  /  TBili  1.4<H>  /  DBili  x   /  AST  65<H>  /  ALT  29  /  AlkPhos  143<H>  25    PT/INR - ( 2020 07:17 )   PT: 16.2 sec;   INR: 1.41          PTT - ( 2020 07:17 )  PTT:31.1 sec  Urinalysis Basic - ( 2020 00:54 )    Color: Yellow / Appearance: Clear / S.010 / pH: x  Gluc: x / Ketone: 15 mg/dL  / Bili: Small / Urobili: 4.0 E.U./dL   Blood: x / Protein: NEGATIVE mg/dL / Nitrite: NEGATIVE   Leuk Esterase: NEGATIVE / RBC: < 5 /HPF / WBC 5-10 /HPF   Sq Epi: x / Non Sq Epi: 0-5 /HPF / Bacteria: Present /HPF        RADIOLOGY & ADDITIONAL TESTS: Reviewed.    MEDICATIONS  (STANDING):  cefTRIAXone   IVPB 1000 milliGRAM(s) IV Intermittent every 24 hours  folic acid 1 milliGRAM(s) Oral daily  lactated ringers. 1000 milliLiter(s) (90 mL/Hr) IV Continuous <Continuous>  multivitamin 1 Tablet(s) Oral daily  octreotide  Infusion 50 MICROgram(s)/Hr (10 mL/Hr) IV Continuous <Continuous>  pantoprazole Infusion 8 mG/Hr (10 mL/Hr) IV Continuous <Continuous>  potassium chloride  10 mEq/100 mL IVPB 10 milliEquivalent(s) IV Intermittent every 1 hour  thiamine 100 milliGRAM(s) Oral daily    MEDICATIONS  (PRN):  LORazepam     Tablet 2 milliGRAM(s) Oral every 2 hours PRN CIWA-Ar score 8 or greater      Allergies    Cipro (Hives; Urticaria)    Intolerances

## 2020-02-26 NOTE — PROGRESS NOTE ADULT - PROBLEM SELECTOR PLAN 3
History of alcohol abuse with cirrhosis history    -Continue Folic Acid 1mg QD  -Continue Multivitamin QD  -Continue Thiamine 100mg QD  -CIWA

## 2020-02-26 NOTE — PROGRESS NOTE ADULT - PROBLEM SELECTOR PLAN 8
1.       PCP Contacted on Admission: (Y/N) --> Name & Phone #:  2.       Date of Contact with PCP:  3.       PCP Contacted at Discharge: (Y/N)  4.       Summary of Handoff Given to PCP:  5.       Post-Discharge Appointment Date and Location: [facility]

## 2020-02-26 NOTE — PROGRESS NOTE ADULT - PROBLEM SELECTOR PLAN 8
1.       PCP Contacted on Admission: (Y/N) --> Name & Phone #:  2.       Date of Contact with PCP:  3.       PCP Contacted at Discharge: (Y/N)  4.       Summary of Handoff Given to PCP:  5.       Post-Discharge Appointment Date and Location: [facility]

## 2020-02-26 NOTE — PROGRESS NOTE ADULT - SUBJECTIVE AND OBJECTIVE BOX
PGY3 TRANSFER ACCEPT NOTE 7 [place] -> [facility]     HOSPITAL COURSE  Ms. Avery is a 60-year-old female with a past medical history of alcohol abuse (no history of withdrawal), sciatica w/recent wrist surgery 2/2 fracture from fall, prior GI bleed who presented with a 2-day history of hematemesis and melena. Hgb 8.6 on admission so admitted for GI bleed. While on 7LA her hemoglobin dropped to 5.7 and she received 2 units PRBC with good response. She underwent EGD which showed a small hiatal hernia, moderate portal hypertensive gastropathy, and atrophic mucosa in the duodenal bulb. She has remained hemodynamically stable and can be stepped down to [facility] for further management pending capsule endoscopy with GI tomorrow.    Subjective: Patient seen and examined at bedside. Reports feeling well, was able to tolerate some broth without any nausea or vomiting. No further hematemesis or melena. Denies CP, SOB, n,v, diarrhea, headache, dysuria.     VITALS  Vital Signs Last 24 Hrs  T(C): 37.2 (2020 17:21), Max: 37.3 (2020 09:24)  T(F): 98.9 (2020 17:21), Max: 99.2 (2020 09:24)  HR: 84 (2020 17:58) (84 - 95)  BP: 129/82 (2020 17:58) (106/61 - 129/82)  BP(mean): 100 (2020 17:58) (88 - 100)  RR: 18 (2020 17:58) (14 - 18)  SpO2: 95% (2020 17:58) (95% - 100%)    I&O's Summary    2020 07:  -  2020 07:00  --------------------------------------------------------  IN: 990 mL / OUT: 600 mL / NET: 390 mL    2020 07:01  -  2020 18:43  --------------------------------------------------------  IN: 650 mL / OUT: 1000 mL / NET: -350 mL        CAPILLARY BLOOD GLUCOSE      PHYSICAL EXAM  General: A&Ox 3; NAD  Head: NC/AT;   Eyes: PERRL; EOMI; anicteric sclera  Neck: Supple; no JVD  Respiratory: CTA B/L; no wheezes/crackles/rales auscultated w/ good air movement  Cardiovascular: Regular rhythm/rate; S1/S2; no gallops or murmurs auscultated  Gastrointestinal: Soft; NTND w/out rebound tenderness or guarding; bowel sounds normal  Extremities: WWP; no edema or cyanosis; Right wrist in a cast, 3 IVs in left arm.   Neurological:  CNII-XII grossly intact; no obvious focal deficits    MEDICATIONS  (STANDING):  cefTRIAXone   IVPB 1000 milliGRAM(s) IV Intermittent every 24 hours  folic acid 1 milliGRAM(s) Oral daily  influenza   Vaccine 0.5 milliLiter(s) IntraMuscular once  magnesium citrate Oral Solution 1 Bottle Oral once  multivitamin 1 Tablet(s) Oral daily  octreotide  Infusion 50 MICROgram(s)/Hr (10 mL/Hr) IV Continuous <Continuous>  pantoprazole  Injectable 40 milliGRAM(s) IV Push every 12 hours  thiamine 100 milliGRAM(s) Oral daily    MEDICATIONS  (PRN):  LORazepam     Tablet 2 milliGRAM(s) Oral every 2 hours PRN CIWA-Ar score 8 or greater      LABS                        8.4    2.35  )-----------( 62       ( 2020 06:51 )             24.3     02    137  |  104  |  15  ----------------------------<  91  3.7   |  20<L>  |  0.60    Ca    8.5      2020 06:51  Phos  1.6       Mg     1.6         TPro  5.3<L>  /  Alb  3.3  /  TBili  1.4<H>  /  DBili  x   /  AST  65<H>  /  ALT  29  /  AlkPhos  143<H>      LIVER FUNCTIONS - ( 2020 07:17 )  Alb: 3.3 g/dL / Pro: 5.3 g/dL / ALK PHOS: 143 U/L / ALT: 29 U/L / AST: 65 U/L / GGT: x           PT/INR - ( 2020 07:17 )   PT: 16.2 sec;   INR: 1.41          PTT - ( 2020 07:17 )  PTT:31.1 sec  Urinalysis Basic - ( 2020 00:54 )    Color: Yellow / Appearance: Clear / S.010 / pH: x  Gluc: x / Ketone: 15 mg/dL  / Bili: Small / Urobili: 4.0 E.U./dL   Blood: x / Protein: NEGATIVE mg/dL / Nitrite: NEGATIVE   Leuk Esterase: NEGATIVE / RBC: < 5 /HPF / WBC 5-10 /HPF   Sq Epi: x / Non Sq Epi: 0-5 /HPF / Bacteria: Present /HPF      CARDIAC MARKERS ( 2020 07:17 )  x     / <0.01 ng/mL / x     / x     / x            IMAGING/EKG/ETC: Reviewed

## 2020-02-26 NOTE — PROGRESS NOTE ADULT - PROBLEM SELECTOR PLAN 2
History of GI bleed in the setting of alcohol abuse. Presented with hematemesis with subsequent melena per HPI.    -Plan for EGD  -Management as above  -Follow up further GI recommendations

## 2020-02-26 NOTE — PROGRESS NOTE ADULT - PROBLEM SELECTOR PLAN 6
Patient presented with low platelets to 105, likely 2/2 alcohol abuse     -Platelets 60 today 2/26  -Monitor platelets on CBC Patient presented with low platelets to 105, likely 2/2 alcohol abuse   -Platelets 60 today 2/26  -Monitor platelets on CBC    #Pancytopenia- likely in the setting of marrow suppression due to EtOH use and cirrhosis  - ctm CBC

## 2020-02-26 NOTE — PROGRESS NOTE ADULT - PROBLEM SELECTOR PLAN 4
Hypokalemia of 3.0 on admission in the setting of emesis    -Potassium supplementations  -Monitor BMP

## 2020-02-27 ENCOUNTER — TRANSCRIPTION ENCOUNTER (OUTPATIENT)
Age: 60
End: 2020-02-27

## 2020-02-27 VITALS
TEMPERATURE: 99 F | DIASTOLIC BLOOD PRESSURE: 74 MMHG | HEART RATE: 79 BPM | RESPIRATION RATE: 17 BRPM | OXYGEN SATURATION: 99 % | SYSTOLIC BLOOD PRESSURE: 114 MMHG

## 2020-02-27 DIAGNOSIS — K70.30 ALCOHOLIC CIRRHOSIS OF LIVER WITHOUT ASCITES: ICD-10-CM

## 2020-02-27 DIAGNOSIS — F43.9 REACTION TO SEVERE STRESS, UNSPECIFIED: ICD-10-CM

## 2020-02-27 DIAGNOSIS — E87.2 ACIDOSIS: ICD-10-CM

## 2020-02-27 DIAGNOSIS — D61.818 OTHER PANCYTOPENIA: ICD-10-CM

## 2020-02-27 LAB
-  AMPICILLIN: SIGNIFICANT CHANGE UP
-  CIPROFLOXACIN: SIGNIFICANT CHANGE UP
-  LEVOFLOXACIN: SIGNIFICANT CHANGE UP
-  NITROFURANTOIN: SIGNIFICANT CHANGE UP
-  TETRACYCLINE: SIGNIFICANT CHANGE UP
-  VANCOMYCIN: SIGNIFICANT CHANGE UP
ANION GAP SERPL CALC-SCNC: 9 MMOL/L — SIGNIFICANT CHANGE UP (ref 5–17)
BLD GP AB SCN SERPL QL: NEGATIVE — SIGNIFICANT CHANGE UP
BUN SERPL-MCNC: 7 MG/DL — SIGNIFICANT CHANGE UP (ref 7–23)
CALCIUM SERPL-MCNC: 8.6 MG/DL — SIGNIFICANT CHANGE UP (ref 8.4–10.5)
CHLORIDE SERPL-SCNC: 104 MMOL/L — SIGNIFICANT CHANGE UP (ref 96–108)
CO2 SERPL-SCNC: 25 MMOL/L — SIGNIFICANT CHANGE UP (ref 22–31)
CREAT SERPL-MCNC: 0.57 MG/DL — SIGNIFICANT CHANGE UP (ref 0.5–1.3)
CULTURE RESULTS: SIGNIFICANT CHANGE UP
GLUCOSE SERPL-MCNC: 143 MG/DL — HIGH (ref 70–99)
HCT VFR BLD CALC: 25.5 % — LOW (ref 34.5–45)
HGB BLD-MCNC: 8.7 G/DL — LOW (ref 11.5–15.5)
MAGNESIUM SERPL-MCNC: 1.9 MG/DL — SIGNIFICANT CHANGE UP (ref 1.6–2.6)
MCHC RBC-ENTMCNC: 32.6 PG — SIGNIFICANT CHANGE UP (ref 27–34)
MCHC RBC-ENTMCNC: 34.1 GM/DL — SIGNIFICANT CHANGE UP (ref 32–36)
MCV RBC AUTO: 95.5 FL — SIGNIFICANT CHANGE UP (ref 80–100)
METHOD TYPE: SIGNIFICANT CHANGE UP
NRBC # BLD: 0 /100 WBCS — SIGNIFICANT CHANGE UP (ref 0–0)
ORGANISM # SPEC MICROSCOPIC CNT: SIGNIFICANT CHANGE UP
ORGANISM # SPEC MICROSCOPIC CNT: SIGNIFICANT CHANGE UP
PLATELET # BLD AUTO: 90 K/UL — LOW (ref 150–400)
POTASSIUM SERPL-MCNC: 3.8 MMOL/L — SIGNIFICANT CHANGE UP (ref 3.5–5.3)
POTASSIUM SERPL-SCNC: 3.8 MMOL/L — SIGNIFICANT CHANGE UP (ref 3.5–5.3)
RBC # BLD: 2.67 M/UL — LOW (ref 3.8–5.2)
RBC # FLD: 16.1 % — HIGH (ref 10.3–14.5)
RH IG SCN BLD-IMP: NEGATIVE — SIGNIFICANT CHANGE UP
SODIUM SERPL-SCNC: 138 MMOL/L — SIGNIFICANT CHANGE UP (ref 135–145)
SPECIMEN SOURCE: SIGNIFICANT CHANGE UP
SURGICAL PATHOLOGY STUDY: SIGNIFICANT CHANGE UP
WBC # BLD: 3.22 K/UL — LOW (ref 3.8–10.5)
WBC # FLD AUTO: 3.22 K/UL — LOW (ref 3.8–10.5)

## 2020-02-27 PROCEDURE — 86803 HEPATITIS C AB TEST: CPT

## 2020-02-27 PROCEDURE — 91110 GI TRC IMG INTRAL ESOPH-ILE: CPT

## 2020-02-27 PROCEDURE — 11313 SHAVE SKIN LESION >2.0 CM: CPT

## 2020-02-27 PROCEDURE — 71045 X-RAY EXAM CHEST 1 VIEW: CPT

## 2020-02-27 PROCEDURE — 36415 COLL VENOUS BLD VENIPUNCTURE: CPT

## 2020-02-27 PROCEDURE — 87186 SC STD MICRODIL/AGAR DIL: CPT

## 2020-02-27 PROCEDURE — 36430 TRANSFUSION BLD/BLD COMPNT: CPT

## 2020-02-27 PROCEDURE — 82272 OCCULT BLD FECES 1-3 TESTS: CPT

## 2020-02-27 PROCEDURE — 99285 EMERGENCY DEPT VISIT HI MDM: CPT | Mod: 25

## 2020-02-27 PROCEDURE — 80053 COMPREHEN METABOLIC PANEL: CPT

## 2020-02-27 PROCEDURE — 81001 URINALYSIS AUTO W/SCOPE: CPT

## 2020-02-27 PROCEDURE — 97161 PT EVAL LOW COMPLEX 20 MIN: CPT

## 2020-02-27 PROCEDURE — 87086 URINE CULTURE/COLONY COUNT: CPT

## 2020-02-27 PROCEDURE — 88305 TISSUE EXAM BY PATHOLOGIST: CPT

## 2020-02-27 PROCEDURE — 80048 BASIC METABOLIC PNL TOTAL CA: CPT

## 2020-02-27 PROCEDURE — 83735 ASSAY OF MAGNESIUM: CPT

## 2020-02-27 PROCEDURE — 83605 ASSAY OF LACTIC ACID: CPT

## 2020-02-27 PROCEDURE — 85730 THROMBOPLASTIN TIME PARTIAL: CPT

## 2020-02-27 PROCEDURE — 86923 COMPATIBILITY TEST ELECTRIC: CPT

## 2020-02-27 PROCEDURE — 84484 ASSAY OF TROPONIN QUANT: CPT

## 2020-02-27 PROCEDURE — 85610 PROTHROMBIN TIME: CPT

## 2020-02-27 PROCEDURE — 84100 ASSAY OF PHOSPHORUS: CPT

## 2020-02-27 PROCEDURE — 96374 THER/PROPH/DIAG INJ IV PUSH: CPT

## 2020-02-27 PROCEDURE — 93005 ELECTROCARDIOGRAM TRACING: CPT | Mod: 76

## 2020-02-27 PROCEDURE — 96361 HYDRATE IV INFUSION ADD-ON: CPT

## 2020-02-27 PROCEDURE — 85025 COMPLETE CBC W/AUTO DIFF WBC: CPT

## 2020-02-27 PROCEDURE — C9113: CPT

## 2020-02-27 PROCEDURE — 86901 BLOOD TYPING SEROLOGIC RH(D): CPT

## 2020-02-27 PROCEDURE — 80307 DRUG TEST PRSMV CHEM ANLYZR: CPT

## 2020-02-27 PROCEDURE — P9016: CPT

## 2020-02-27 PROCEDURE — 85027 COMPLETE CBC AUTOMATED: CPT

## 2020-02-27 PROCEDURE — 99239 HOSP IP/OBS DSCHRG MGMT >30: CPT | Mod: GC

## 2020-02-27 PROCEDURE — 86850 RBC ANTIBODY SCREEN: CPT

## 2020-02-27 RX ORDER — CEFTRIAXONE 500 MG/1
1000 INJECTION, POWDER, FOR SOLUTION INTRAMUSCULAR; INTRAVENOUS EVERY 24 HOURS
Refills: 0 | Status: DISCONTINUED | OUTPATIENT
Start: 2020-02-27 | End: 2020-02-27

## 2020-02-27 RX ADMIN — Medication 1 TABLET(S): at 12:50

## 2020-02-27 RX ADMIN — PANTOPRAZOLE SODIUM 40 MILLIGRAM(S): 20 TABLET, DELAYED RELEASE ORAL at 06:18

## 2020-02-27 RX ADMIN — Medication 100 MILLIGRAM(S): at 12:50

## 2020-02-27 RX ADMIN — OXYCODONE HYDROCHLORIDE 5 MILLIGRAM(S): 5 TABLET ORAL at 00:00

## 2020-02-27 RX ADMIN — Medication 1 MILLIGRAM(S): at 12:50

## 2020-02-27 RX ADMIN — CEFTRIAXONE 100 MILLIGRAM(S): 500 INJECTION, POWDER, FOR SOLUTION INTRAMUSCULAR; INTRAVENOUS at 10:22

## 2020-02-27 RX ADMIN — Medication 1 BOTTLE: at 06:18

## 2020-02-27 NOTE — DISCHARGE NOTE PROVIDER - NSDCFUADDAPPT_GEN_ALL_CORE_FT
Please follow up with your Gastroenterologist, Dr. Borrero, on 3/30 @ 2:30p. Please visit the 7th e office. The office will call you to get an earlier appointment. Please bring a copy of this paper work with you.

## 2020-02-27 NOTE — PHYSICAL THERAPY INITIAL EVALUATION ADULT - PERTINENT HX OF CURRENT PROBLEM, REHAB EVAL
60 y/o F PMH  alcohol abuse (per chart, PCP, patient denies), lyme disease, pyelonephritis GI bleed who presented to [facility] with complaint of 2 days of hematemesis/melena.

## 2020-02-27 NOTE — PROGRESS NOTE ADULT - PROBLEM SELECTOR PLAN 3
History of alcohol abuse with cirrhosis history. Last drink estimated over 1 week ago.   -C/w Folic Acid 1mg QD  -C/w Multivitamin QD  -C/w Thiamine 100mg QD  -C/w CIWA History of alcohol abuse. Previously consumed 1 bottle per days, now reports decreased intake. Last drink estimated over 1 week ago. No evidence of w/drawal at this time  -C/w Folic Acid 1mg QD  -C/w Multivitamin QD  -C/w Thiamine 100mg QD

## 2020-02-27 NOTE — PROGRESS NOTE ADULT - PROBLEM SELECTOR PLAN 2
History of GI bleed in the setting of alcohol abuse. Presented with hematemesis with subsequent melena per HPI.  - s/p EGD as above, plan for pill endoscopy this AM (r/o other source of bleed)  -Management as above  -Follow up further GI recommendations Hx as per above. Colonoscopy and endoscopy on 12/19 admission.  - s/p EGD as above, plan for pill endoscopy this AM (r/o other source of bleed)  -Management as above  -Protonix 40mg BID  -Follow up further GI recommendations

## 2020-02-27 NOTE — PROGRESS NOTE ADULT - PROBLEM SELECTOR PLAN 1
Improving. History of gastritis and alcohol abuse. Presented with 2 days of hematemesis that progressed to melena. Hgb 8.6 on admission which is improved from baseline of 7 but this 2/25 her hemoglobin was 5.7 so given 2 units PRBC.  -CBC trending upward >8 2/26 (8.4) and today (8.7; 2/27)  -Maintain IV access- currently with 3 large bore IV's  -GI following, s/p EGD showing small hiatal hernia, moderate portal hypertensive gastropathy, and atrophic mucosa in the duodenal bulb  - NPO since MN for pill endoscopy today, s/p mag citrate 8PM (2/26) and 6AM (2/27) Improving. History of gastritis and alcohol abuse. Presented with 2 days of hematemesis that progressed to melena. Hgb 8.6 on admission which is improved from baseline of 7 but this 2/25 her hemoglobin was 5.7 so given 2 units PRBC.  -CBC trending upward >8 2/26 (8.4) and today (8.7; 2/27)  -Maintain IV access- currently with 3 large bore IV's  -GI following, s/p EGD showing small hiatal hernia, moderate portal hypertensive gastropathy, and atrophic mucosa in the duodenal bulb  -Per GI Recs can d/c today after passing of pill  - NPO since MN for pill endoscopy today, s/p mag citrate 8PM (2/26) and 6AM (2/27) Improving. Hgb 8.6 on admission. c/b Hb drop to 5.7 s/p 2 units (2/25). No active sites of bleeding noted. Endoscopy with small hiatal hernia and portal hypertensive gastropathy (seen on previous) and atrophic duodenal bulb mucosa.  -Maintain IV access- currently with 3 large bore IV's  - Active T&S 2/27  -Transfuse Hb <7

## 2020-02-27 NOTE — DISCHARGE NOTE PROVIDER - NSDCMRMEDTOKEN_GEN_ALL_CORE_FT
folic acid 1 mg oral tablet: 1 tab(s) orally once a day   Multiple Vitamins oral tablet: 1 tab(s) orally once a day   thiamine 100 mg oral tablet: 1 tab(s) orally once a day

## 2020-02-27 NOTE — DISCHARGE NOTE PROVIDER - NSDCCPCAREPLAN_GEN_ALL_CORE_FT
PRINCIPAL DISCHARGE DIAGNOSIS  Diagnosis: Upper GI bleed  Assessment and Plan of Treatment: Gastrointestinal (GI) bleeding is a symptom of a disorder in your digestive tract. The blood often appears in stool or vomit but isn't always visible. An endoscopy was performed which showed no active sites of bleeding. You also had the pill study where you swallowed a camera. The GI team will call you discuss results. If you develop Lightheadedness, Difficulty breathing, Fainting, notice stool to look black or tarry, or margot blood please seek medical attention. Otherwise, follow up as planned.      SECONDARY DISCHARGE DIAGNOSES  Diagnosis: Alcoholic cirrhosis of liver without ascites  Assessment and Plan of Treatment: Cirrhosis is a late stage of scarring (fibrosis) of the liver caused by many forms of liver diseases and conditions, such as hepatitis and chronic alcoholism. Each time your liver is injured — whether by disease, excessive alcohol consumption or another cause — it tries to repair itself. In the process, scar tissue forms. As cirrhosis progresses, more and more scar tissue forms, making it difficult for the liver to function (decompensated cirrhosis). This was diagnosed on a previous admission, it is currently stable, but you will need continued follow up with a GI specialist.    Diagnosis: Stress  Assessment and Plan of Treatment: In our discussion you expressed a lot of concerns about the stressors in your life. It is recommend that you seek out and practice positive and safe coping techniques. If needed, you could also consider following seeing a therapist to discuss these issues. If you ever have concerns or thoughts about hurting yourself or others, please seek medical attention.

## 2020-02-27 NOTE — PROGRESS NOTE ADULT - PROBLEM SELECTOR PLAN 7
F: None  E: Replete for K<4.0 and Mg< 2.0   N: NPO after MN for pill endoscopy   DVT: SCD  Dispo: [facility] Patient reports over last few years she has had a divorce (after 25 years), lost her mom, lost a close mentor (2 weeks ago), had pylonephritis, lymes disease x2 and is under significant stress. Reports after divorce she bought two homes and jeep (concerning for underlying psychologic disease)  -encourage therapy and positve coping techniques

## 2020-02-27 NOTE — PROGRESS NOTE ADULT - PROBLEM SELECTOR PLAN 9
1) PCP   2) Date of Contact with PCP:  3) PCP Contacted at Discharge: TBD  4) Summary of Handoff Given to PCP: TBD  5) Post-Discharge Appointment Date and Location: TBD

## 2020-02-27 NOTE — PHYSICAL THERAPY INITIAL EVALUATION ADULT - ACTIVE RANGE OF MOTION EXAMINATION, REHAB EVAL
bilateral  lower extremity Active ROM was WFL (within functional limits)/R wrist not assessed 2/2 splint/bilateral upper extremity Active ROM was WFL (within functional limits)

## 2020-02-27 NOTE — DISCHARGE NOTE NURSING/CASE MANAGEMENT/SOCIAL WORK - PATIENT PORTAL LINK FT
You can access the FollowMyHealth Patient Portal offered by [facility] by registering at the following website: [email or web address]. By joining [facility]’s FollowMyHealth portal, you will also be able to view your health information using other applications (apps) compatible with our system.

## 2020-02-27 NOTE — PROGRESS NOTE ADULT - SUBJECTIVE AND OBJECTIVE BOX
Pt seen and examined at bedside. KYLER overnight. No further episodes of hematemesis or melena. Denies abdominal pain, nausea, vomiting.     Allergies  Cipro (Hives; Urticaria)    MEDICATIONS:  MEDICATIONS  (STANDING):  cefTRIAXone   IVPB 1000 milliGRAM(s) IV Intermittent every 24 hours  folic acid 1 milliGRAM(s) Oral daily  influenza   Vaccine 0.5 milliLiter(s) IntraMuscular once  melatonin 5 milliGRAM(s) Oral at bedtime  multivitamin 1 Tablet(s) Oral daily  pantoprazole  Injectable 40 milliGRAM(s) IV Push every 12 hours  thiamine 100 milliGRAM(s) Oral daily    MEDICATIONS  (PRN):  acetaminophen   Tablet .. 650 milliGRAM(s) Oral every 6 hours PRN Moderate Pain (4 - 6)    Vital Signs Last 24 Hrs  T(C): 36.6 (27 Feb 2020 05:40), Max: 37.8 (26 Feb 2020 20:02)  T(F): 97.9 (27 Feb 2020 05:40), Max: 100.1 (26 Feb 2020 20:02)  HR: 76 (27 Feb 2020 10:10) (74 - 98)  BP: 128/87 (27 Feb 2020 10:10) (106/61 - 129/82)  BP(mean): 100 (26 Feb 2020 17:58) (100 - 100)  RR: 18 (27 Feb 2020 05:40) (14 - 18)  SpO2: 97% (27 Feb 2020 10:10) (95% - 99%)    02-26 @ 07:01  -  02-27 @ 07:00  --------------------------------------------------------  IN: 1436 mL / OUT: 1600 mL / NET: -164 mL      PHYSICAL EXAM:    General: Well developed; well nourished; in no acute distress  HEENT: MMM, conjunctiva and sclera clear  Neck: Supple  Respiratory: No respiratory distress. No intercostal retractions  Gastrointestinal: Soft non-tender non-distended;  No rebound or guarding  Extremities: No clubbing, cyanosis, or edema  Skin: Warm and dry. No obvious rash, spider angiomas, telangiectasias, palmar erythema.  Neuro: A&O x 3. No asterixis.    LABS:                        8.7    3.22  )-----------( 90       ( 27 Feb 2020 06:19 )             25.5     02-27    138  |  104  |  7   ----------------------------<  143<H>  3.8   |  25  |  0.57    Ca    8.6      27 Feb 2020 06:19  Mg     1.9     02-27    Culture - Urine (collected 25 Feb 2020 08:42)  Source: .Urine urine culture  Preliminary Report (26 Feb 2020 12:44):    80,000 CFU/ml Enterococcus faecalis    Susceptibility to follow.    RADIOLOGY & ADDITIONAL STUDIES: No new radiologic studies.

## 2020-02-27 NOTE — DISCHARGE NOTE PROVIDER - HOSPITAL COURSE
TERE CUEVAS is 60y woman MHx of alcohol abuse and a prior GI bleed (12/2019) who presented with 1 week of coffee ground hematemesis and melena found to have no active bleed on EGD and awaiting Pill Endoscopy results.             Problem List/Main Diagnoses (system-based):         PSYCH:    #Alcohol Use: History of alcohol abuse. Previously consumed 1 bottle per days, now reports decreased intake. Last drink estimated over 1 week ago. No evidence of w/drawal at this time. C/w Folic Acid 1mg QD, Multivitamin QD, Thiamine 100mg QD.         #Stress: Stress. Plan: Patient reports over last few years she has had a divorce (after 25 years), lost her mom, lost a close mentor (2 weeks ago), had pyonephritis, lyme disease x2 and is under significant stress. Reports after divorce she bought two homes and jeep (concerning for underlying psychologic disease). Encourage positive coping techniques.        HEME/ONC:    #Anemia due to blood loss: Hgb 8.6 on admission. c/b Hb drop to 5.7 s/p 2 units (2/25). Hb stable >8 on discharge. Patient likely has baseline anemia 2/2 chronic alcohol use.         #Pancytopenia. Plan: Improving. Anemia (see above). Leukopenia likely 2/2 chronic alcohol use. Thrombocytopenia also likely 2/2 to alcohol use. Appears near baseline        GASTROINTESTINAL:    #GI Bleed: On previous admission, patient presented with Coffee ground emesis and hematochezia. EGD performed and found no for acute bleeding but was notable for small hiatal hernia, portal hypertensive gastropathy, duodenitis, and 1-2 cm submucosal gastric lesion. Colonoscopy performed and revealed moderate diverticulosis and multiple polyps. This admission she presented with the same emesis, but now with melena. Endoscopy with small hiatal hernia and portal hypertensive gastropathy (seen on previous) and atrophic duodenal bulb mucosa. Pill endoscopy performed 2/27. Patient to follow up results outpatient.         #Cirrhosis: Diagnosed on previous Admission. Meld 12 (6% 3 month mortality). Presumed 2/2 prolonged alcohol use. 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 Downtrending LFTs. s/p Octreotide. S/p 3 doses CTX SBP PPx (d/c as no active bleed on endoscopy).        New medications: None    Medication Stopped: Protonix    Labs to be followed outpatient: Hb    Exam to be followed outpatient: Pill endoscopy

## 2020-02-27 NOTE — SBIRT NOTE ADULT - NSSBIRTBRIEFINTDET_GEN_A_CORE
Patient reports drinking on special occasions. Patient recently cut back on her alcohol intake. SW offered patient resources, patient declined.

## 2020-02-27 NOTE — DISCHARGE NOTE PROVIDER - CARE PROVIDER_API CALL
Luiz Borrero)  Gastroenterology; Internal Medicine  [address] [place] [address]  Phone: [phone]  Fax: [phone]  Scheduled Appointment: 03/30/2020 02:30 PM

## 2020-02-27 NOTE — PROGRESS NOTE ADULT - ASSESSMENT
58 YO F h/o EtOH abuse, EtOH cirrhosis, lyme disease, p/w hematemesis and melena x 3 days.    # Acute blood loss anemia 2/2 likely UGIB 2/2 portal hypertensive gastropathy vs AVMs  - s/p EGD showing a small hiatal hernia, moderate portal hypertensive gastropathy, and atrophic duodenal mucosa  - VCE today  - H/H stable, no further e/o acute GI bleeding (i.e. melena, hematochezia, hematemesis)  - Monitor H/H, transfuse Hgb goal 7-9  - If patient is discharged, patient can follow-up with Dr. Shrestha or Dr. Borrero (patient preference due to location)  - Will reach out with results of VCE  - DC PPI and ceftriaxone    # EtOH cirrhosis (-PSE, -EV, -SBP) - MELD Na 13  - Ascites: No prior history of ascites  - EV: No e/o EV on recent EGD  - PSE: No prior history of PSE, no e/o asterixis on exam    Case d/w Dr. Borrero

## 2020-02-27 NOTE — PROGRESS NOTE ADULT - PROBLEM SELECTOR PLAN 5
AST//53 likely from alcohol use  -Continue to Monitor LFTs (2/25 143/65; 2/27 Labs pending)  -GI following  -Alcohol cessation education  -Current MELD score 13 Improved and Stable. Hypokalemia of 3.0 on admission in the setting of emesis  -s/p Potassium supplementation. K currently stable last two days (2/26 3.7; 2/27 3.8)  -Continue to Monitor BMP Improving. Anemia (see above). Leukopenia likely 2/2 chronic alcohol use. Thrombocytopenia also likely 2/2 to alcohol use. Appears near baseline  -Trend CBC  -hold off on VitK supplementation

## 2020-02-27 NOTE — PROGRESS NOTE ADULT - PROBLEM SELECTOR PLAN 8
1.       PCP Contacted on Admission: (Y/N) --> TBD  2.       Date of Contact with PCP: TBD  3.       PCP Contacted at Discharge: TBD  4.       Summary of Handoff Given to PCP: TBD  5.       Post-Discharge Appointment Date and Location: TBD F: none  E: replete K <4, Mg <2  N: Clears_> regular  Last BM: 2/27  VTE: SCDs  GI PPx: PPI BID  Sleep Aid: melatonin  Dispo: Home  Code: Full

## 2020-02-27 NOTE — PHYSICAL THERAPY INITIAL EVALUATION ADULT - GENERAL OBSERVATIONS, REHAB EVAL
Patient received seated upright in bed. Patient left seated upright in bed + call bell. FIM gait=7, FIM stairs=7.

## 2020-02-27 NOTE — PROGRESS NOTE ADULT - PROBLEM SELECTOR PLAN 4
Improved and Stable. Hypokalemia of 3.0 on admission in the setting of emesis  -s/p Potassium supplementation. K currently stable last two days (2/26 3.7; 2/27 3.8)  -Continue to Monitor BMP Improving. Patient presented with low platelets to 105, likely 2/2 alcohol abuse.   -Platelets improvin , 90   -Continue to monitor platelets on CBC    #Pancytopenia- likely in the setting of marrow suppression due to EtOH use and cirrhosis  - ctm CBC, improving  (WBC, Hb, PLTs, etc increasing) Diagnosed on previous Admission. Meld 12 (6% 3 month mortality). Presumed 2/2 prolonged alcohol use. 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 Downtrending LFTs. s/p Octreotide. S/p 3 doses CTX SBP PPx (d/c as no active bleed on endoscopy).  -GI following  -SBIRT

## 2020-02-27 NOTE — PHYSICAL THERAPY INITIAL EVALUATION ADULT - ADDITIONAL COMMENTS
Patient lives on 5th floor of walkup apartment, alone. States has best friend and ex 's company in same building to assist if needed. Indpt at baseline. H/o of fall jan 2020 resulting in R wrist fx, h/o back pain. R handed, no visual aides, denies activity limitation, devices.

## 2020-02-27 NOTE — DISCHARGE NOTE PROVIDER - NSDCFUSCHEDAPPT_GEN_ALL_CORE_FT
TERE CUEVAS ; 03/05/2020 ; NPP Gastro [address]  TERE CUEVAS ; 03/30/2020 ; NPP Gastro [address] [facility] [address] TERE CUEVAS ; 03/05/2020 ; NPP Gastro [address]  TERE CUEVAS ; 03/30/2020 ; NPP Gastro [address] [facility] [address] TERE CUEVAS ; 03/05/2020 ; NPP Gastro [address]  TERE CUEVAS ; 03/30/2020 ; NPP Gastro [address] [facility] [address]

## 2020-02-27 NOTE — PROGRESS NOTE ADULT - ASSESSMENT
60-year-old female with a past medical history of alcohol abuse and a prior GI bleed who presented with a 2-day history of hematemesis and melena. Hgb 8.6 on admission so admitted for GI bleed. 60-year-old female with MHxy of alcohol abuse and a prior GI bleed (12/2019) who presented with 1 week of coffee ground hematemesis and melena.

## 2020-02-27 NOTE — PROGRESS NOTE ADULT - PROBLEM SELECTOR PLAN 6
Improving. Patient presented with low platelets to 105, likely 2/2 alcohol abuse.   -Platelets improvin , 90   -Continue to monitor platelets on CBC    #Pancytopenia- likely in the setting of marrow suppression due to EtOH use and cirrhosis  - ctm CBC, improving  (WBC, Hb, PLTs, etc increasing) AST//53 likely from alcohol use  -Continue to Monitor LFTs (2/25 143/65; 2/27 Labs pending)  -GI following  -Alcohol cessation education  -Current MELD score 13 AST//53 likely from alcohol use  -Unnecessary to trend LFTs (2/25 143/65) as patient is currently at baseline and will not substantially , especially with expected d/c this evening.  -GI following  -Alcohol cessation education  -Current MELD score 13 Patient not on TPN, no evidence of adrenal pathology, no evidence of RTA, and not on any medications that can cause this (spironolactone, acetazolamide). However, patient received NS on admission and has had diarrhea. This is most likely cause  -if persists will get urine lytes to assess for RTA

## 2020-02-27 NOTE — PROGRESS NOTE ADULT - SUBJECTIVE AND OBJECTIVE BOX
INTERVAL EVENTS:   No major acute events reported overnight. Patient refused AM vitals.     SUBJECTIVE:   Patient seen this AM prior to rounds. Patient noted she refused vitals this AM because "no was listening to me and people have been making noise in the halls all night." Patient notes she was cold all night, slept less than 45 minutes, and is very tired. Patient given blanket and discussed plan for the day. Patient amenable to obtaining AM vitals. Patient voiding and stooling well. Patient is currently NPO awaiting AM pill endoscopy. Patient with no other acute complaints and denies palpitations, tremors, pain, SOB, lightheadedness, but notes her limbs feel "fatigued and heavy."      REVIEW OF SYSTEMS  ICU Vital Signs Last 24 Hrs  T(C): 37.6 (26 Feb 2020 22:32), Max: 37.8 (26 Feb 2020 20:02)  T(F): 99.7 (26 Feb 2020 22:32), Max: 100.1 (26 Feb 2020 20:02)  HR: 98 (26 Feb 2020 22:32) (84 - 98)  BP: 117/72 (26 Feb 2020 22:32) (106/61 - 129/82)  BP(mean): 100 (26 Feb 2020 17:58) (94 - 100)  RR: 18 (26 Feb 2020 22:32) (14 - 18)  SpO2: 98% (26 Feb 2020 22:32) (95% - 99%)    I&O's Detail    26 Feb 2020 07:01  -  27 Feb 2020 07:00  --------------------------------------------------------  IN:    lactated ringers.: 360 mL    octreotide  Infusion: 100 mL    Oral Fluid: 640 mL    pantoprazole Infusion: 40 mL  Total IN: 1140 mL    OUT:    Voided: 1600 mL  Total OUT: 1600 mL    Total NET: -460 mL                          8.7    3.22  )-----------( 90       ( 27 Feb 2020 06:19 )             25.5     02-27    138  |  104  |  7   ----------------------------<  143<H>  3.8   |  25  |  0.57    Ca    8.6      27 Feb 2020 06:19  Mg     1.9     02-27    MEDICATIONS  (STANDING):  cefTRIAXone   IVPB 1000 milliGRAM(s) IV Intermittent every 24 hours  folic acid 1 milliGRAM(s) Oral daily  influenza   Vaccine 0.5 milliLiter(s) IntraMuscular once  melatonin 5 milliGRAM(s) Oral at bedtime  multivitamin 1 Tablet(s) Oral daily  pantoprazole  Injectable 40 milliGRAM(s) IV Push every 12 hours  thiamine 100 milliGRAM(s) Oral daily    MEDICATIONS  (PRN):  acetaminophen   Tablet .. 650 milliGRAM(s) Oral every 6 hours PRN Moderate Pain (4 - 6) INTERVAL EVENTS:   No major acute events reported overnight. Patient refused AM vitals.     SUBJECTIVE:   Patient seen this AM prior to rounds. Patient noted she refused vitals this AM because "no was listening to me and people have been making noise in the halls all night." Patient notes she was cold all night, slept less than 45 minutes, and is very tired. Patient given blanket and discussed plan for the day. Patient amenable to obtaining AM vitals. Patient voiding and stooling well. Patient is currently NPO awaiting AM pill endoscopy. Patient with no other acute complaints and denies palpitations, tremors, pain, SOB, lightheadedness, but notes her limbs feel "fatigued and heavy."      REVIEW OF SYSTEMS: Negative unless noted above.    ICU Vital Signs Last 24 Hrs  T(C): 37.6 (26 Feb 2020 22:32), Max: 37.8 (26 Feb 2020 20:02)  T(F): 99.7 (26 Feb 2020 22:32), Max: 100.1 (26 Feb 2020 20:02)  HR: 98 (26 Feb 2020 22:32) (84 - 98)  BP: 117/72 (26 Feb 2020 22:32) (106/61 - 129/82)  BP(mean): 100 (26 Feb 2020 17:58) (94 - 100)  RR: 18 (26 Feb 2020 22:32) (14 - 18)  SpO2: 98% (26 Feb 2020 22:32) (95% - 99%)    Physical Exam:  General: A&Ox 3; NAD  Head: NC/AT;   Eyes: PERRL; EOMI; anicteric sclera  Neck: Supple; no JVD  Respiratory: CTA B/L; no wheezes/crackles/rales auscultated w/ good air movement  Cardiovascular: Regular rhythm/rate; S1/S2; no gallops or murmurs auscultated  Gastrointestinal: Soft; NTND w/out rebound tenderness or guarding; bowel sounds normal  Extremities: WWP; no edema or cyanosis; Right wrist in a cast, 3 IVs in left arm.   Neurological:  CNII-XII grossly intact; no obvious focal deficits    I&O's Detail    26 Feb 2020 07:01  -  27 Feb 2020 07:00  --------------------------------------------------------  IN:    lactated ringers.: 360 mL    octreotide  Infusion: 100 mL    Oral Fluid: 640 mL    pantoprazole Infusion: 40 mL  Total IN: 1140 mL    OUT:    Voided: 1600 mL  Total OUT: 1600 mL    Total NET: -460 mL                          8.7    3.22  )-----------( 90       ( 27 Feb 2020 06:19 )             25.5     02-27    138  |  104  |  7   ----------------------------<  143<H>  3.8   |  25  |  0.57    Ca    8.6      27 Feb 2020 06:19  Mg     1.9     02-27    MEDICATIONS  (STANDING):  cefTRIAXone   IVPB 1000 milliGRAM(s) IV Intermittent every 24 hours  folic acid 1 milliGRAM(s) Oral daily  influenza   Vaccine 0.5 milliLiter(s) IntraMuscular once  melatonin 5 milliGRAM(s) Oral at bedtime  multivitamin 1 Tablet(s) Oral daily  pantoprazole  Injectable 40 milliGRAM(s) IV Push every 12 hours  thiamine 100 milliGRAM(s) Oral daily    MEDICATIONS  (PRN):  acetaminophen   Tablet .. 650 milliGRAM(s) Oral every 6 hours PRN Moderate Pain (4 - 6) INTERVAL EVENTS:   No major acute events reported overnight. Patient refused AM vitals.     SUBJECTIVE:   Patient seen this AM prior to rounds. Patient noted she refused vitals this AM because "no was listening to me and people have been making noise in the halls all night." Patient notes she was cold all night, slept less than 45 minutes, and is very tired. Patient given blanket and discussed plan for the day. Patient amenable to obtaining AM vitals. Patient voiding and stooling well. Patient is currently NPO awaiting AM pill endoscopy. Patient with no other acute complaints and denies palpitations, tremors, pain, SOB, lightheadedness, but notes her limbs feel "fatigued and heavy."      REVIEW OF SYSTEMS: Negative unless noted above.    ICU Vital Signs Last 24 Hrs  T(C): 37.6 (26 Feb 2020 22:32), Max: 37.8 (26 Feb 2020 20:02)  T(F): 99.7 (26 Feb 2020 22:32), Max: 100.1 (26 Feb 2020 20:02)  HR: 98 (26 Feb 2020 22:32) (84 - 98)  BP: 117/72 (26 Feb 2020 22:32) (106/61 - 129/82)  BP(mean): 100 (26 Feb 2020 17:58) (94 - 100)  RR: 18 (26 Feb 2020 22:32) (14 - 18)  SpO2: 98% (26 Feb 2020 22:32) (95% - 99%)    Physical Exam:  General: A&Ox 3; NAD  Head: Grossly NC/AT with exception of mild ecchymosis on R forehead from previous fall  Eyes: PERRL; EOMI; anicteric sclera  Neck: Supple; no JVD  Respiratory: CTA B/L; no wheezes/crackles/rales auscultated w/ good air movement  Cardiovascular: Regular rhythm/rate; S1/S2; no gallops or murmurs auscultated  Gastrointestinal: Soft; NTND w/out rebound tenderness or guarding; bowel sounds normal  Extremities: WWP; no edema or cyanosis; Right wrist in a cast, 3 IVs in left arm.   Neurological:  CNII-XII grossly intact; no obvious focal deficits    I&O's Detail    26 Feb 2020 07:01  -  27 Feb 2020 07:00  --------------------------------------------------------  IN:    lactated ringers.: 360 mL    octreotide  Infusion: 100 mL    Oral Fluid: 640 mL    pantoprazole Infusion: 40 mL  Total IN: 1140 mL    OUT:    Voided: 1600 mL  Total OUT: 1600 mL    Total NET: -460 mL                          8.7    3.22  )-----------( 90       ( 27 Feb 2020 06:19 )             25.5     02-27    138  |  104  |  7   ----------------------------<  143<H>  3.8   |  25  |  0.57    Ca    8.6      27 Feb 2020 06:19  Mg     1.9     02-27    MEDICATIONS  (STANDING):  cefTRIAXone   IVPB 1000 milliGRAM(s) IV Intermittent every 24 hours  folic acid 1 milliGRAM(s) Oral daily  influenza   Vaccine 0.5 milliLiter(s) IntraMuscular once  melatonin 5 milliGRAM(s) Oral at bedtime  multivitamin 1 Tablet(s) Oral daily  pantoprazole  Injectable 40 milliGRAM(s) IV Push every 12 hours  thiamine 100 milliGRAM(s) Oral daily    MEDICATIONS  (PRN):  acetaminophen   Tablet .. 650 milliGRAM(s) Oral every 6 hours PRN Moderate Pain (4 - 6) INTERVAL EVENTS:   No major acute events reported overnight. Patient refused AM vitals.     SUBJECTIVE:   Patient seen this AM prior to rounds. Patient noted she refused vitals this AM because "no was listening to me and people have been making noise in the halls all night." Patient notes she was cold all night, slept less than 45 minutes, and is very tired. Patient given blanket and discussed plan for the day. Patient amenable to obtaining AM vitals. Patient voiding and stooling well. Patient is currently NPO awaiting AM pill endoscopy. Reports she has a h/a on the right side of her head at the site of fall (reports there is orbital Fx). She also has mild occipital h/a exacerbated by rotation. Feels bloated, but denies N/V. Last episode of emesis was 2 days ago. Still with melena, though more liquidy with prep. Denies CP, SOB, fever, chills, N/T.      REVIEW OF SYSTEMS: Negative unless noted above.    ICU Vital Signs Last 24 Hrs  T(C): 37.6 (26 Feb 2020 22:32), Max: 37.8 (26 Feb 2020 20:02)  T(F): 99.7 (26 Feb 2020 22:32), Max: 100.1 (26 Feb 2020 20:02)  HR: 98 (26 Feb 2020 22:32) (84 - 98)  BP: 117/72 (26 Feb 2020 22:32) (106/61 - 129/82)  BP(mean): 100 (26 Feb 2020 17:58) (94 - 100)  RR: 18 (26 Feb 2020 22:32) (14 - 18)  SpO2: 98% (26 Feb 2020 22:32) (95% - 99%)    Physical Exam:  General: A&Ox 3; NAD  Head: Grossly NC/AT with healing periorbital ecchymosis on R forehead   Eyes: PERRL; EOMI; anicteric sclera  Neck: Supple; no JVD; no meningeus   Respiratory: CTA B/L; no wheezes/crackles/rales auscultated w/ good air movement  Cardiovascular: Regular rhythm/rate; S1/S2; no gallops or murmurs auscultated  Gastrointestinal: Soft; NTND w/out rebound tenderness or guarding; bowel sounds normal  Extremities: WWP; no edema or cyanosis; Right wrist in a brace, 3 IVs in left arm, Extremities WTT, 2+ pulses LUE, and BLE.  Neurological:  CNII-XII grossly intact; no obvious focal deficits    I&O's Detail    26 Feb 2020 07:01  -  27 Feb 2020 07:00  --------------------------------------------------------  IN:    lactated ringers.: 360 mL    octreotide  Infusion: 100 mL    Oral Fluid: 640 mL    pantoprazole Infusion: 40 mL  Total IN: 1140 mL    OUT:    Voided: 1600 mL  Total OUT: 1600 mL    Total NET: -460 mL                          8.7    3.22  )-----------( 90       ( 27 Feb 2020 06:19 )             25.5     02-27    138  |  104  |  7   ----------------------------<  143<H>  3.8   |  25  |  0.57    Ca    8.6      27 Feb 2020 06:19  Mg     1.9     02-27    MEDICATIONS  (STANDING):  cefTRIAXone   IVPB 1000 milliGRAM(s) IV Intermittent every 24 hours  folic acid 1 milliGRAM(s) Oral daily  influenza   Vaccine 0.5 milliLiter(s) IntraMuscular once  melatonin 5 milliGRAM(s) Oral at bedtime  multivitamin 1 Tablet(s) Oral daily  pantoprazole  Injectable 40 milliGRAM(s) IV Push every 12 hours  thiamine 100 milliGRAM(s) Oral daily    MEDICATIONS  (PRN):  acetaminophen   Tablet .. 650 milliGRAM(s) Oral every 6 hours PRN Moderate Pain (4 - 6)

## 2020-02-28 RX ORDER — FOLIC ACID 0.8 MG
1 TABLET ORAL
Qty: 30 | Refills: 0
Start: 2020-02-28 | End: 2020-03-28

## 2020-02-28 RX ORDER — THIAMINE MONONITRATE (VIT B1) 100 MG
1 TABLET ORAL
Qty: 30 | Refills: 0
Start: 2020-02-28 | End: 2020-03-28

## 2020-03-02 ENCOUNTER — OUTPATIENT (OUTPATIENT)
Dept: OUTPATIENT SERVICES | Facility: HOSPITAL | Age: 60
LOS: 1 days | End: 2020-03-02
Payer: MEDICAID

## 2020-03-02 ENCOUNTER — APPOINTMENT (OUTPATIENT)
Dept: RADIOLOGY | Facility: CLINIC | Age: 60
End: 2020-03-02

## 2020-03-02 PROCEDURE — 73110 X-RAY EXAM OF WRIST: CPT | Mod: 26,RT

## 2020-03-04 DIAGNOSIS — D61.818 OTHER PANCYTOPENIA: ICD-10-CM

## 2020-03-04 DIAGNOSIS — K76.6 PORTAL HYPERTENSION: ICD-10-CM

## 2020-03-04 DIAGNOSIS — K92.1 MELENA: ICD-10-CM

## 2020-03-04 DIAGNOSIS — E87.2 ACIDOSIS: ICD-10-CM

## 2020-03-04 DIAGNOSIS — E44.1 MILD PROTEIN-CALORIE MALNUTRITION: ICD-10-CM

## 2020-03-04 DIAGNOSIS — K31.9 DISEASE OF STOMACH AND DUODENUM, UNSPECIFIED: ICD-10-CM

## 2020-03-04 DIAGNOSIS — K70.30 ALCOHOLIC CIRRHOSIS OF LIVER WITHOUT ASCITES: ICD-10-CM

## 2020-03-04 DIAGNOSIS — K31.89 OTHER DISEASES OF STOMACH AND DUODENUM: ICD-10-CM

## 2020-03-04 DIAGNOSIS — D68.9 COAGULATION DEFECT, UNSPECIFIED: ICD-10-CM

## 2020-03-04 DIAGNOSIS — F10.10 ALCOHOL ABUSE, UNCOMPLICATED: ICD-10-CM

## 2020-03-04 DIAGNOSIS — E87.6 HYPOKALEMIA: ICD-10-CM

## 2020-03-04 DIAGNOSIS — K92.2 GASTROINTESTINAL HEMORRHAGE, UNSPECIFIED: ICD-10-CM

## 2020-03-04 DIAGNOSIS — A69.20 LYME DISEASE, UNSPECIFIED: ICD-10-CM

## 2020-03-04 DIAGNOSIS — Z71.89 OTHER SPECIFIED COUNSELING: ICD-10-CM

## 2020-03-04 DIAGNOSIS — D62 ACUTE POSTHEMORRHAGIC ANEMIA: ICD-10-CM

## 2020-03-04 DIAGNOSIS — I95.1 ORTHOSTATIC HYPOTENSION: ICD-10-CM

## 2020-03-04 DIAGNOSIS — F43.9 REACTION TO SEVERE STRESS, UNSPECIFIED: ICD-10-CM

## 2020-03-04 DIAGNOSIS — Z88.1 ALLERGY STATUS TO OTHER ANTIBIOTIC AGENTS STATUS: ICD-10-CM

## 2020-03-05 ENCOUNTER — APPOINTMENT (OUTPATIENT)
Dept: GASTROENTEROLOGY | Facility: CLINIC | Age: 60
End: 2020-03-05

## 2020-03-14 ENCOUNTER — INPATIENT (INPATIENT)
Facility: HOSPITAL | Age: 60
LOS: 2 days | Discharge: HOME HEALTH | DRG: 433 | End: 2020-03-17
Attending: STUDENT IN AN ORGANIZED HEALTH CARE EDUCATION/TRAINING PROGRAM | Admitting: STUDENT IN AN ORGANIZED HEALTH CARE EDUCATION/TRAINING PROGRAM
Payer: COMMERCIAL

## 2020-03-14 VITALS
OXYGEN SATURATION: 97 % | HEART RATE: 115 BPM | DIASTOLIC BLOOD PRESSURE: 74 MMHG | TEMPERATURE: 98 F | RESPIRATION RATE: 18 BRPM | WEIGHT: 111.99 LBS | SYSTOLIC BLOOD PRESSURE: 108 MMHG | HEIGHT: 64 IN

## 2020-03-14 DIAGNOSIS — D69.6 THROMBOCYTOPENIA, UNSPECIFIED: ICD-10-CM

## 2020-03-14 DIAGNOSIS — Z29.9 ENCOUNTER FOR PROPHYLACTIC MEASURES, UNSPECIFIED: ICD-10-CM

## 2020-03-14 DIAGNOSIS — K92.2 GASTROINTESTINAL HEMORRHAGE, UNSPECIFIED: ICD-10-CM

## 2020-03-14 DIAGNOSIS — K74.60 UNSPECIFIED CIRRHOSIS OF LIVER: ICD-10-CM

## 2020-03-14 DIAGNOSIS — F10.10 ALCOHOL ABUSE, UNCOMPLICATED: ICD-10-CM

## 2020-03-14 DIAGNOSIS — D64.9 ANEMIA, UNSPECIFIED: ICD-10-CM

## 2020-03-14 LAB
ALBUMIN SERPL ELPH-MCNC: 3.2 G/DL — LOW (ref 3.4–5)
ALBUMIN SERPL ELPH-MCNC: 3.5 G/DL — SIGNIFICANT CHANGE UP (ref 3.3–5)
ALP SERPL-CCNC: 135 U/L — HIGH (ref 40–120)
ALP SERPL-CCNC: 162 U/L — HIGH (ref 40–120)
ALT FLD-CCNC: 20 U/L — SIGNIFICANT CHANGE UP (ref 10–45)
ALT FLD-CCNC: 29 U/L — SIGNIFICANT CHANGE UP (ref 12–42)
ANION GAP SERPL CALC-SCNC: 10 MMOL/L — SIGNIFICANT CHANGE UP (ref 9–16)
ANION GAP SERPL CALC-SCNC: 12 MMOL/L — SIGNIFICANT CHANGE UP (ref 5–17)
APTT BLD: 26.5 SEC — LOW (ref 27.5–36.3)
APTT BLD: 28.1 SEC — SIGNIFICANT CHANGE UP (ref 27.5–36.3)
AST SERPL-CCNC: 45 U/L — HIGH (ref 10–40)
AST SERPL-CCNC: 48 U/L — HIGH (ref 15–37)
BASOPHILS # BLD AUTO: 0.02 K/UL — SIGNIFICANT CHANGE UP (ref 0–0.2)
BASOPHILS NFR BLD AUTO: 0.5 % — SIGNIFICANT CHANGE UP (ref 0–2)
BILIRUB SERPL-MCNC: 1.2 MG/DL — SIGNIFICANT CHANGE UP (ref 0.2–1.2)
BILIRUB SERPL-MCNC: 1.6 MG/DL — HIGH (ref 0.2–1.2)
BLD GP AB SCN SERPL QL: NEGATIVE — SIGNIFICANT CHANGE UP
BLD GP AB SCN SERPL QL: NEGATIVE — SIGNIFICANT CHANGE UP
BUN SERPL-MCNC: 27 MG/DL — HIGH (ref 7–23)
BUN SERPL-MCNC: 29 MG/DL — HIGH (ref 7–23)
CALCIUM SERPL-MCNC: 8.9 MG/DL — SIGNIFICANT CHANGE UP (ref 8.4–10.5)
CALCIUM SERPL-MCNC: 9.5 MG/DL — SIGNIFICANT CHANGE UP (ref 8.5–10.5)
CHLORIDE SERPL-SCNC: 102 MMOL/L — SIGNIFICANT CHANGE UP (ref 96–108)
CHLORIDE SERPL-SCNC: 107 MMOL/L — SIGNIFICANT CHANGE UP (ref 96–108)
CK SERPL-CCNC: 35 U/L — SIGNIFICANT CHANGE UP (ref 26–192)
CO2 SERPL-SCNC: 25 MMOL/L — SIGNIFICANT CHANGE UP (ref 22–31)
CO2 SERPL-SCNC: 28 MMOL/L — SIGNIFICANT CHANGE UP (ref 22–31)
CREAT SERPL-MCNC: 0.4 MG/DL — LOW (ref 0.5–1.3)
CREAT SERPL-MCNC: 0.56 MG/DL — SIGNIFICANT CHANGE UP (ref 0.5–1.3)
EOSINOPHIL # BLD AUTO: 0 K/UL — SIGNIFICANT CHANGE UP (ref 0–0.5)
EOSINOPHIL NFR BLD AUTO: 0 % — SIGNIFICANT CHANGE UP (ref 0–6)
ETHANOL SERPL-MCNC: <3 MG/DL — SIGNIFICANT CHANGE UP
GLUCOSE SERPL-MCNC: 114 MG/DL — HIGH (ref 70–99)
GLUCOSE SERPL-MCNC: 156 MG/DL — HIGH (ref 70–99)
HCT VFR BLD CALC: 18.4 % — CRITICAL LOW (ref 34.5–45)
HCT VFR BLD CALC: 20.6 % — CRITICAL LOW (ref 34.5–45)
HCT VFR BLD CALC: 20.6 % — CRITICAL LOW (ref 34.5–45)
HCT VFR BLD CALC: 25.8 % — LOW (ref 34.5–45)
HGB BLD-MCNC: 6 G/DL — CRITICAL LOW (ref 11.5–15.5)
HGB BLD-MCNC: 6.7 G/DL — CRITICAL LOW (ref 11.5–15.5)
HGB BLD-MCNC: 6.8 G/DL — CRITICAL LOW (ref 11.5–15.5)
HGB BLD-MCNC: 8.3 G/DL — LOW (ref 11.5–15.5)
IMM GRANULOCYTES NFR BLD AUTO: 0.3 % — SIGNIFICANT CHANGE UP (ref 0–1.5)
INR BLD: 1.19 — HIGH (ref 0.88–1.16)
INR BLD: 1.26 — HIGH (ref 0.88–1.16)
LACTATE SERPL-SCNC: 1.8 MMOL/L — SIGNIFICANT CHANGE UP (ref 0.5–2)
LIDOCAIN IGE QN: 132 U/L — SIGNIFICANT CHANGE UP (ref 73–393)
LYMPHOCYTES # BLD AUTO: 0.98 K/UL — LOW (ref 1–3.3)
LYMPHOCYTES # BLD AUTO: 25.3 % — SIGNIFICANT CHANGE UP (ref 13–44)
MAGNESIUM SERPL-MCNC: 1.5 MG/DL — LOW (ref 1.6–2.6)
MCHC RBC-ENTMCNC: 31.5 PG — SIGNIFICANT CHANGE UP (ref 27–34)
MCHC RBC-ENTMCNC: 31.7 PG — SIGNIFICANT CHANGE UP (ref 27–34)
MCHC RBC-ENTMCNC: 31.8 PG — SIGNIFICANT CHANGE UP (ref 27–34)
MCHC RBC-ENTMCNC: 32.1 PG — SIGNIFICANT CHANGE UP (ref 27–34)
MCHC RBC-ENTMCNC: 32.2 GM/DL — SIGNIFICANT CHANGE UP (ref 32–36)
MCHC RBC-ENTMCNC: 32.5 GM/DL — SIGNIFICANT CHANGE UP (ref 32–36)
MCHC RBC-ENTMCNC: 32.6 GM/DL — SIGNIFICANT CHANGE UP (ref 32–36)
MCHC RBC-ENTMCNC: 33 GM/DL — SIGNIFICANT CHANGE UP (ref 32–36)
MCV RBC AUTO: 96.3 FL — SIGNIFICANT CHANGE UP (ref 80–100)
MCV RBC AUTO: 96.7 FL — SIGNIFICANT CHANGE UP (ref 80–100)
MCV RBC AUTO: 98.4 FL — SIGNIFICANT CHANGE UP (ref 80–100)
MCV RBC AUTO: 98.5 FL — SIGNIFICANT CHANGE UP (ref 80–100)
MONOCYTES # BLD AUTO: 0.6 K/UL — SIGNIFICANT CHANGE UP (ref 0–0.9)
MONOCYTES NFR BLD AUTO: 15.5 % — HIGH (ref 2–14)
NEUTROPHILS # BLD AUTO: 2.26 K/UL — SIGNIFICANT CHANGE UP (ref 1.8–7.4)
NEUTROPHILS NFR BLD AUTO: 58.4 % — SIGNIFICANT CHANGE UP (ref 43–77)
NRBC # BLD: 0 /100 WBCS — SIGNIFICANT CHANGE UP (ref 0–0)
PLATELET # BLD AUTO: 107 K/UL — LOW (ref 150–400)
PLATELET # BLD AUTO: 113 K/UL — LOW (ref 150–400)
PLATELET # BLD AUTO: 72 K/UL — LOW (ref 150–400)
PLATELET # BLD AUTO: 74 K/UL — LOW (ref 150–400)
POTASSIUM SERPL-MCNC: 3.3 MMOL/L — LOW (ref 3.5–5.3)
POTASSIUM SERPL-MCNC: 3.8 MMOL/L — SIGNIFICANT CHANGE UP (ref 3.5–5.3)
POTASSIUM SERPL-SCNC: 3.3 MMOL/L — LOW (ref 3.5–5.3)
POTASSIUM SERPL-SCNC: 3.8 MMOL/L — SIGNIFICANT CHANGE UP (ref 3.5–5.3)
PROT SERPL-MCNC: 5.8 G/DL — LOW (ref 6–8.3)
PROT SERPL-MCNC: 6.4 G/DL — SIGNIFICANT CHANGE UP (ref 6.4–8.2)
PROTHROM AB SERPL-ACNC: 13.6 SEC — HIGH (ref 10–12.9)
PROTHROM AB SERPL-ACNC: 14.1 SEC — HIGH (ref 10–12.9)
RBC # BLD: 1.87 M/UL — LOW (ref 3.8–5.2)
RBC # BLD: 2.13 M/UL — LOW (ref 3.8–5.2)
RBC # BLD: 2.14 M/UL — LOW (ref 3.8–5.2)
RBC # BLD: 2.62 M/UL — LOW (ref 3.8–5.2)
RBC # FLD: 15.8 % — HIGH (ref 10.3–14.5)
RBC # FLD: 15.9 % — HIGH (ref 10.3–14.5)
RBC # FLD: 16.6 % — HIGH (ref 10.3–14.5)
RBC # FLD: 17 % — HIGH (ref 10.3–14.5)
RH IG SCN BLD-IMP: NEGATIVE — SIGNIFICANT CHANGE UP
RH IG SCN BLD-IMP: NEGATIVE — SIGNIFICANT CHANGE UP
SODIUM SERPL-SCNC: 140 MMOL/L — SIGNIFICANT CHANGE UP (ref 132–145)
SODIUM SERPL-SCNC: 144 MMOL/L — SIGNIFICANT CHANGE UP (ref 135–145)
TROPONIN I SERPL-MCNC: <0.017 NG/ML — LOW (ref 0.02–0.06)
WBC # BLD: 2.91 K/UL — LOW (ref 3.8–10.5)
WBC # BLD: 3.23 K/UL — LOW (ref 3.8–10.5)
WBC # BLD: 3.63 K/UL — LOW (ref 3.8–10.5)
WBC # BLD: 3.87 K/UL — SIGNIFICANT CHANGE UP (ref 3.8–10.5)
WBC # FLD AUTO: 2.91 K/UL — LOW (ref 3.8–10.5)
WBC # FLD AUTO: 3.23 K/UL — LOW (ref 3.8–10.5)
WBC # FLD AUTO: 3.63 K/UL — LOW (ref 3.8–10.5)
WBC # FLD AUTO: 3.87 K/UL — SIGNIFICANT CHANGE UP (ref 3.8–10.5)

## 2020-03-14 PROCEDURE — 71045 X-RAY EXAM CHEST 1 VIEW: CPT | Mod: 26,59

## 2020-03-14 PROCEDURE — 99291 CRITICAL CARE FIRST HOUR: CPT

## 2020-03-14 PROCEDURE — 99232 SBSQ HOSP IP/OBS MODERATE 35: CPT | Mod: GC

## 2020-03-14 RX ORDER — ONDANSETRON 8 MG/1
8 TABLET, FILM COATED ORAL ONCE
Refills: 0 | Status: COMPLETED | OUTPATIENT
Start: 2020-03-14 | End: 2020-03-14

## 2020-03-14 RX ORDER — SODIUM CHLORIDE 9 MG/ML
1000 INJECTION INTRAMUSCULAR; INTRAVENOUS; SUBCUTANEOUS ONCE
Refills: 0 | Status: COMPLETED | OUTPATIENT
Start: 2020-03-14 | End: 2020-03-14

## 2020-03-14 RX ORDER — INFLUENZA VIRUS VACCINE 15; 15; 15; 15 UG/.5ML; UG/.5ML; UG/.5ML; UG/.5ML
0.5 SUSPENSION INTRAMUSCULAR ONCE
Refills: 0 | Status: DISCONTINUED | OUTPATIENT
Start: 2020-03-14 | End: 2020-03-17

## 2020-03-14 RX ORDER — CEFTRIAXONE 500 MG/1
1000 INJECTION, POWDER, FOR SOLUTION INTRAMUSCULAR; INTRAVENOUS EVERY 24 HOURS
Refills: 0 | Status: COMPLETED | OUTPATIENT
Start: 2020-03-14 | End: 2020-03-14

## 2020-03-14 RX ORDER — PANTOPRAZOLE SODIUM 20 MG/1
40 TABLET, DELAYED RELEASE ORAL EVERY 12 HOURS
Refills: 0 | Status: DISCONTINUED | OUTPATIENT
Start: 2020-03-14 | End: 2020-03-16

## 2020-03-14 RX ORDER — THIAMINE MONONITRATE (VIT B1) 100 MG
100 TABLET ORAL DAILY
Refills: 0 | Status: DISCONTINUED | OUTPATIENT
Start: 2020-03-14 | End: 2020-03-17

## 2020-03-14 RX ORDER — CEFTRIAXONE 500 MG/1
1000 INJECTION, POWDER, FOR SOLUTION INTRAMUSCULAR; INTRAVENOUS EVERY 24 HOURS
Refills: 0 | Status: DISCONTINUED | OUTPATIENT
Start: 2020-03-14 | End: 2020-03-16

## 2020-03-14 RX ORDER — FAMOTIDINE 10 MG/ML
20 INJECTION INTRAVENOUS ONCE
Refills: 0 | Status: COMPLETED | OUTPATIENT
Start: 2020-03-14 | End: 2020-03-14

## 2020-03-14 RX ORDER — ACETAMINOPHEN 500 MG
650 TABLET ORAL EVERY 6 HOURS
Refills: 0 | Status: DISCONTINUED | OUTPATIENT
Start: 2020-03-14 | End: 2020-03-17

## 2020-03-14 RX ORDER — OCTREOTIDE ACETATE 200 UG/ML
50 INJECTION, SOLUTION INTRAVENOUS; SUBCUTANEOUS
Qty: 500 | Refills: 0 | Status: DISCONTINUED | OUTPATIENT
Start: 2020-03-14 | End: 2020-03-16

## 2020-03-14 RX ORDER — PANTOPRAZOLE SODIUM 20 MG/1
8 TABLET, DELAYED RELEASE ORAL
Qty: 80 | Refills: 0 | Status: DISCONTINUED | OUTPATIENT
Start: 2020-03-14 | End: 2020-03-14

## 2020-03-14 RX ORDER — FOLIC ACID 0.8 MG
1 TABLET ORAL DAILY
Refills: 0 | Status: DISCONTINUED | OUTPATIENT
Start: 2020-03-14 | End: 2020-03-17

## 2020-03-14 RX ADMIN — Medication 650 MILLIGRAM(S): at 22:53

## 2020-03-14 RX ADMIN — PANTOPRAZOLE SODIUM 40 MILLIGRAM(S): 20 TABLET, DELAYED RELEASE ORAL at 18:41

## 2020-03-14 RX ADMIN — Medication 650 MILLIGRAM(S): at 21:53

## 2020-03-14 RX ADMIN — SODIUM CHLORIDE 1000 MILLILITER(S): 9 INJECTION INTRAMUSCULAR; INTRAVENOUS; SUBCUTANEOUS at 03:06

## 2020-03-14 RX ADMIN — FAMOTIDINE 20 MILLIGRAM(S): 10 INJECTION INTRAVENOUS at 03:05

## 2020-03-14 RX ADMIN — Medication 650 MILLIGRAM(S): at 15:37

## 2020-03-14 RX ADMIN — ONDANSETRON 8 MILLIGRAM(S): 8 TABLET, FILM COATED ORAL at 03:39

## 2020-03-14 RX ADMIN — CEFTRIAXONE 100 MILLIGRAM(S): 500 INJECTION, POWDER, FOR SOLUTION INTRAMUSCULAR; INTRAVENOUS at 03:39

## 2020-03-14 RX ADMIN — OCTREOTIDE ACETATE 10 MICROGRAM(S)/HR: 200 INJECTION, SOLUTION INTRAVENOUS; SUBCUTANEOUS at 15:37

## 2020-03-14 RX ADMIN — Medication 650 MILLIGRAM(S): at 16:07

## 2020-03-14 RX ADMIN — CEFTRIAXONE 100 MILLIGRAM(S): 500 INJECTION, POWDER, FOR SOLUTION INTRAMUSCULAR; INTRAVENOUS at 21:53

## 2020-03-14 RX ADMIN — PANTOPRAZOLE SODIUM 10 MG/HR: 20 TABLET, DELAYED RELEASE ORAL at 03:44

## 2020-03-14 RX ADMIN — OCTREOTIDE ACETATE 10 MICROGRAM(S)/HR: 200 INJECTION, SOLUTION INTRAVENOUS; SUBCUTANEOUS at 03:44

## 2020-03-14 RX ADMIN — SODIUM CHLORIDE 1000 MILLILITER(S): 9 INJECTION INTRAMUSCULAR; INTRAVENOUS; SUBCUTANEOUS at 03:48

## 2020-03-14 RX ADMIN — PANTOPRAZOLE SODIUM 40 MILLIGRAM(S): 20 TABLET, DELAYED RELEASE ORAL at 06:33

## 2020-03-14 NOTE — H&P ADULT - HISTORY OF PRESENT ILLNESS
Ms. Avery is a 60F with PMH EtOH abuse, cirrhosis, anemia, prior GI bleeds who presents with hematemesis/melena this evening. Of note she was recently admitted to [facility] (Feb 27 2019) for GI bleed/melena. EGD performed during that admission did not find source of acute bleed but did find small hiatal hernia, protal hypertensive gastropathy, duodenitis and 1-2 submucosal gastric lesion. Colonoscopy performed during that admission showed moderate diverticulosis and multiple polyps. Pt states that her last drink was 1 week ago (glass of wine). She had 15 min of coffee ground emesis (unsure of number of emesis episodes). She denies fever, abd pain, CP, SOB but endorses intermittent chills. She denies hx of esophageal varices or intubations for EtOH withdrawals.    ED Course: T 98,  (range 104-115), /74 (108//74), S 97% on RA, R 18. White count 3.63, hemoglobin 6.8, platelet 107, PTT, PT/INR, PT, potassium 3.3, BUN/crt 29/.56, bilirubin 1.6, alk phos 162, AST/ALT (48/29). Troponin negative, alcohol level <3 (per patient had a glass of wine 1 week ago). GI consulted (Dr. Bates fellow on call) recommending ceftriaxone, octreotide, and PPI. ICU consulted at [facility], deemed stable for RMF. Had no hematemesis in the ED or in route. Ms. Avery is a 60F with PMH EtOH abuse, cirrhosis, anemia, prior GI bleeds, R wrist break, who presents with hematemesis/melena this evening. Of note she was recently admitted to [facility] (Feb 27 2019) for GI bleed/melena. EGD performed during that admission did not find source of acute bleed but did find small hiatal hernia, protal hypertensive gastropathy, duodenitis and 1-2 submucosal gastric lesion. Colonoscopy performed during that admission showed moderate diverticulosis and multiple polyps. Pt states that her last drink was 1 week ago (glass of wine). She had 15 min of coffee ground emesis (unsure of number of emesis episodes). She denies fever, abd pain, CP, SOB but endorses intermittent chills. She denies hx of esophageal varices or intubations for EtOH withdrawals.    ED Course: T 98,  (range 104-115), /74 (108//74), S 97% on RA, R 18. White count 3.63, hemoglobin 6.8, platelet 107, PTT, PT/INR, PT, potassium 3.3, BUN/crt 29/.56, bilirubin 1.6, alk phos 162, AST/ALT (48/29). Troponin negative, alcohol level <3 (per patient had a glass of wine 1 week ago). GI consulted (Dr. Bates fellow on call) recommending ceftriaxone, octreotide, and PPI. ICU consulted at [facility], deemed stable for RMF. Had no hematemesis in the ED or in route.

## 2020-03-14 NOTE — ED ADULT NURSE NOTE - CHPI ED NUR CONTEXT2
[de-identified] : LEFT ANKLE\par W ORKERS COMP\par FOLLOW UP\par [facility]\par PAIN LEVEL 6/10\par RADIATING PAIN\par BETTER WITH HEAT\par WORSE WITH STANDNIGN FOR LONG PERIODS unknown

## 2020-03-14 NOTE — H&P ADULT - ASSESSMENT
60F with PMH EtOH abuse, cirrhosis, anemia, prior GI bleeds, R wrist break, who presents with hematemesis/melena this evening concern for GI bleed.

## 2020-03-14 NOTE — H&P ADULT - PROBLEM SELECTOR PLAN 6
F: none  E: replete electrolytes K< 4, Mg <2  N: NPO for GI procedure  DVT PPx:  hold in setting of active GI bleed  Code status: Full Code

## 2020-03-14 NOTE — H&P ADULT - NSHPLABSRESULTS_GEN_ALL_CORE
.  LABS:                         6.0    3.87  )-----------( 113      ( 14 Mar 2020 05:43 )             18.4     03-14    144  |  107  |  27<H>  ----------------------------<  114<H>  3.8   |  25  |  0.40<L>    Ca    8.9      14 Mar 2020 05:43  Mg     1.5     03-14    TPro  5.8<L>  /  Alb  3.5  /  TBili  1.2  /  DBili  x   /  AST  45<H>  /  ALT  20  /  AlkPhos  135<H>  03-14    PT/INR - ( 14 Mar 2020 05:43 )   PT: 13.6 sec;   INR: 1.19          PTT - ( 14 Mar 2020 05:43 )  PTT:28.1 sec    CARDIAC MARKERS ( 14 Mar 2020 02:26 )  <0.017 ng/mL / x     / 35 U/L / x     / x            Lactate, Blood: 1.8 mmol/L (03-14 @ 05:35)      RADIOLOGY, EKG & ADDITIONAL TESTS: Reviewed.

## 2020-03-14 NOTE — PROGRESS NOTE ADULT - SUBJECTIVE AND OBJECTIVE BOX
Pt seen and examined at bedside.  Patient denies any further episodes of hematemesis/melena   Reports that her last drink was 1 week ago     Allergies    Cipro (Hives; Urticaria)    Intolerances      MEDICATIONS:  MEDICATIONS  (STANDING):  cefTRIAXone   IVPB 1000 milliGRAM(s) IV Intermittent every 24 hours  folic acid 1 milliGRAM(s) Oral daily  influenza   Vaccine 0.5 milliLiter(s) IntraMuscular once  multivitamin 1 Tablet(s) Oral daily  octreotide  Infusion 50 MICROgram(s)/Hr (10 mL/Hr) IV Continuous <Continuous>  pantoprazole  Injectable 40 milliGRAM(s) IV Push every 12 hours  thiamine 100 milliGRAM(s) Oral daily    MEDICATIONS  (PRN):  acetaminophen   Tablet .. 650 milliGRAM(s) Oral every 6 hours PRN Mild Pain (1 - 3)    Vital Signs Last 24 Hrs  T(C): 36.7 (14 Mar 2020 09:37), Max: 37.3 (14 Mar 2020 05:06)  T(F): 98.1 (14 Mar 2020 09:37), Max: 99.1 (14 Mar 2020 05:06)  HR: 90 (14 Mar 2020 09:37) (90 - 115)  BP: 119/77 (14 Mar 2020 09:37) (108/74 - 128/83)  BP(mean): --  RR: 16 (14 Mar 2020 09:37) (16 - 18)  SpO2: 97% (14 Mar 2020 09:37) (94% - 98%)    PHYSICAL EXAM:    General: Well developed; well nourished; in no acute distress  HEENT: MMM, conjunctiva and sclera clear  Neck: Supple  CV: RRR. Normal S1/S2  Respiratory: CTAB. No respiratory distress. No intercostal retractions  Gastrointestinal: Soft non-tender non-distended. Normal bowel sounds. No hepatosplenomegaly. No rebound or guarding  Extremities: No clubbing, cyanosis, or edema. Right wrist in brace  Skin: Warm and dry.   Neuro: A&O x 3. no asterixis   Psych: Normal affect and mood    LABS:                        6.7    2.91  )-----------( 72       ( 14 Mar 2020 12:30 )             20.6     03-14    144  |  107  |  27<H>  ----------------------------<  114<H>  3.8   |  25  |  0.40<L>    Ca    8.9      14 Mar 2020 05:43  Mg     1.5     03-14    TPro  5.8<L>  /  Alb  3.5  /  TBili  1.2  /  DBili  x   /  AST  45<H>  /  ALT  20  /  AlkPhos  135<H>  03-14    PT/INR - ( 14 Mar 2020 05:43 )   PT: 13.6 sec;   INR: 1.19          PTT - ( 14 Mar 2020 05:43 )  PTT:28.1 sec                  RADIOLOGY & ADDITIONAL STUDIES:

## 2020-03-14 NOTE — CONSULT NOTE ADULT - ASSESSMENT
60F with PMH EtOH abuse, cirrhosis (-PSE, -EV, -ascites), recent admission 2/25-2/27/20 for hematemesis sp EGD now presents with 1d of hematemesis and melena.     #Acute blood loss anemia  Suspect from UGIB 2/2 portal hypertensive gastropathy seen in recent endoscopy.  Given nausea and vomiting, differential includes levon-painter and PUD.  Low suspicion of variceal bleeding in the setting of recent endoscopy.  HD stable and hgb responding to transfusion, lowers suspicion of active GI bleeding.  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 Gibbs
Patient is a 60 year old woman with complaining of hematemesis/melena this evening.   #UGIB  Patient seen and examined at bedside. Patient hemodynamically stable with improving sinus tachycardia. Sinus tach likely in the setting of anemia and underlying psych d/o. Patient crying intermittently on interview, w/ anxious affect.  - management and workup per primary team and gastroenterology   - patient remains stable for regional medical floor

## 2020-03-14 NOTE — ED ADULT NURSE NOTE - LOCATION
[facility]: Preventive Care And Screening: Influenza Immunization: Influenza Immunization not Administered because Patient Refused. Detail Level: Detailed abdomen

## 2020-03-14 NOTE — H&P ADULT - NSHPPHYSICALEXAM_GEN_ALL_CORE
.  VITAL SIGNS:  T(C): 37.3 (03-14-20 @ 05:06), Max: 37.3 (03-14-20 @ 05:06)  T(F): 99.1 (03-14-20 @ 05:06), Max: 99.1 (03-14-20 @ 05:06)  HR: 107 (03-14-20 @ 05:06) (104 - 115)  BP: 128/83 (03-14-20 @ 05:06) (108/74 - 128/83)  BP(mean): --  RR: 18 (03-14-20 @ 05:06) (16 - 18)  SpO2: 94% (03-14-20 @ 05:06) (94% - 98%)  Wt(kg): --    PHYSICAL EXAM:    Constitutional: WDWN resting comfortably in bed; NAD  Head: NC/AT  Eyes: PERRL, EOMI, anicteric sclera  ENT: no nasal discharge; uvula midline, no oropharyngeal erythema or exudates; MMM  Neck: supple; no JVD or thyromegaly  Respiratory: CTA B/L; no W/R/R, no retractions  Cardiac: +S1/S2; RRR; no M/R/G; PMI non-displaced  Gastrointestinal: soft, NT/ND; no rebound or guarding; +BSx4, + melena with external hemorrhoids on JACLYN,   Back: spine midline, no bony tenderness or step-offs; no CVAT B/L  Extremities: WWP, no clubbing or cyanosis; no peripheral edema  Musculoskeletal: NROM x4; no joint swelling, tenderness or erythema  Vascular: 2+ radial, femoral, DP/PT pulses B/L  Dermatologic: skin warm, dry and intact; no rashes, wounds, or scars  Lymphatic: no submandibular or cervical LAD  Neurologic: AAOx3; CNII-XII grossly intact; no focal deficits  Psychiatric: affect and characteristics of appearance, verbalizations, behaviors are appropriate

## 2020-03-14 NOTE — ED PROVIDER NOTE - CLINICAL SUMMARY MEDICAL DECISION MAKING FREE TEXT BOX
hematemesis, d/w Dr Howe intensivist recommends floor admission, d/w Dr Bates from GI recommends ceftriaxone, octreotide and PPI and d/w transfer center admit to Dr De Leon

## 2020-03-14 NOTE — CONSULT NOTE ADULT - SUBJECTIVE AND OBJECTIVE BOX
incomplete	  HPI:  Ms. Avery is a 60F with PMH EtOH abuse, cirrhosis, anemia, prior GI bleeds, R wrist break, who presents with hematemesis/melena this evening. Of note she was recently admitted to [facility] (Feb 27 2019) for GI bleed/melena. EGD performed during that admission did not find source of acute bleed but did find small hiatal hernia, protal hypertensive gastropathy, duodenitis and 1-2 submucosal gastric lesion. Colonoscopy performed during that admission showed moderate diverticulosis and multiple polyps. Pt states that her last drink was 1 week ago (glass of wine). She had 15 min of coffee ground emesis (unsure of number of emesis episodes). She denies fever, abd pain, CP, SOB but endorses intermittent chills. She denies hx of esophageal varices or intubations for EtOH withdrawals.    ED Course: T 98,  (range 104-115), /74 (108//74), S 97% on RA, R 18. White count 3.63, hemoglobin 6.8, platelet 107, PTT, PT/INR, PT, potassium 3.3, BUN/crt 29/.56, bilirubin 1.6, alk phos 162, AST/ALT (48/29). Troponin negative, alcohol level <3 (per patient had a glass of wine 1 week ago). GI consulted (Dr. Bates fellow on call) recommending ceftriaxone, octreotide, and PPI. ICU consulted at [facility], deemed stable for RMF. Had no hematemesis in the ED or in route. (14 Mar 2020 04:39)    Allergies    Cipro (Hives; Urticaria)    Intolerances      Home Medications:    MEDICATIONS:  MEDICATIONS  (STANDING):  cefTRIAXone   IVPB 1000 milliGRAM(s) IV Intermittent every 24 hours  folic acid 1 milliGRAM(s) Oral daily  influenza   Vaccine 0.5 milliLiter(s) IntraMuscular once  multivitamin 1 Tablet(s) Oral daily  octreotide  Infusion 50 MICROgram(s)/Hr (10 mL/Hr) IV Continuous <Continuous>  pantoprazole  Injectable 40 milliGRAM(s) IV Push every 12 hours  thiamine 100 milliGRAM(s) Oral daily    MEDICATIONS  (PRN):    PAST MEDICAL & SURGICAL HISTORY:  Pyelonephritis  Gastritis  Lyme disease  No significant past surgical history    FAMILY HISTORY:    SOCIAL HISTORY:  Tobacco: [ ] Current, [ ] Former, [ ] Never; Pack Years:  Alcohol:  Illicit Drugs:    REVIEW OF SYSTEMS:  All other 10 review of systems is negative except as indicated in HPI    Vital Signs Last 24 Hrs  T(C): 36.7 (14 Mar 2020 09:37), Max: 37.3 (14 Mar 2020 05:06)  T(F): 98.1 (14 Mar 2020 09:37), Max: 99.1 (14 Mar 2020 05:06)  HR: 90 (14 Mar 2020 09:37) (90 - 115)  BP: 119/77 (14 Mar 2020 09:37) (108/74 - 128/83)  BP(mean): --  RR: 16 (14 Mar 2020 09:37) (16 - 18)  SpO2: 97% (14 Mar 2020 09:37) (94% - 98%)      PHYSICAL EXAM:    General: Well developed; well nourished; in no acute distress  Eyes: moist conjunctivae, sclerae anicteric  HENT: Moist mucous membranes, tongue midline  Neck: Trachea midline, supple  Lungs: Normal respiratory effort and no intercostal retractions  Cardiovascular: RRR, S1S2  Abdomen: Soft, non-tender non-distended; Normal bowel sounds; No rebound or guarding  Extremities: Normal range of motion, No clubbing, cyanosis or edema  Neurological: Alert and oriented x3, nonfocal exam  Skin: Warm and dry. No obvious rash    LABS:                        6.0    3.87  )-----------( 113      ( 14 Mar 2020 05:43 )             18.4     03-14    144  |  107  |  27<H>  ----------------------------<  114<H>  3.8   |  25  |  0.40<L>    Ca    8.9      14 Mar 2020 05:43  Mg     1.5     03-14    TPro  5.8<L>  /  Alb  3.5  /  TBili  1.2  /  DBili  x   /  AST  45<H>  /  ALT  20  /  AlkPhos  135<H>  03-14        PT/INR - ( 14 Mar 2020 05:43 )   PT: 13.6 sec;   INR: 1.19          PTT - ( 14 Mar 2020 05:43 )  PTT:28.1 sec    RADIOLOGY & ADDITIONAL STUDIES: HPI:  60F with PMH EtOH abuse, cirrhosis (-PSE, -VH, -ascites), recent admission 2/25-2/27/20 for hematemesis sp EGD now presents with 1d of hematemesis and melena.  Patient reports that she had waited to come to the ER in the past.  On the day of admission, she was not feeling well and had nausea with hematemesis in the evening.  It was associated with melena and she decided to come to the ED.  She had initial lightheadedness and tachycardia that resolved with 500 cc IVF before admitted from [facility].  She reports last drink around a week ago.  She had 1-2 glasses of wine last week.      Last admission, she had EGD showing a small hiatal hernia, moderate portal hypertensive gastropathy, and atrophic duodenal mucosa.  VCE with "The study was complete as the capsule was visualized traversing across the ICV into the colon. The gastric passage time was 52 m and the SB passage time was 3 h 45 m. There was evidence of portal hypertensive gastropathy. Multiple non-specific red spots throughout the small bowel. Multiple colonic polyps and portal hypertensive colopathy"    In December: EGD was unrevealing for acute bleeding but was notable for small hiatal hernia, portal hypertensive gastropathy, duodenitis, and 1-2 cm submucosal gastric lesion.  Colonoscopy 12/5/19 notable for 3 polyps s/p polypectomy (TVA) and retrieval and diverticulosis      Allergies    Cipro (Hives; Urticaria)    Intolerances      Home Medications:    MEDICATIONS:  MEDICATIONS  (STANDING):  cefTRIAXone   IVPB 1000 milliGRAM(s) IV Intermittent every 24 hours  folic acid 1 milliGRAM(s) Oral daily  influenza   Vaccine 0.5 milliLiter(s) IntraMuscular once  multivitamin 1 Tablet(s) Oral daily  octreotide  Infusion 50 MICROgram(s)/Hr (10 mL/Hr) IV Continuous <Continuous>  pantoprazole  Injectable 40 milliGRAM(s) IV Push every 12 hours  thiamine 100 milliGRAM(s) Oral daily    MEDICATIONS  (PRN):    PAST MEDICAL & SURGICAL HISTORY:  Pyelonephritis  Gastritis  Lyme disease  No significant past surgical history    FAMILY HISTORY:  adopted, she is unaware of her fam hx    SOCIAL HISTORY:  Tobacco: denies  Alcohol: Prior 1 bottle of wine daily, currently glass of wine occasionally  Illicit Drugs:denies    REVIEW OF SYSTEMS:  All other 10 review of systems is negative except as indicated in HPI    Vital Signs Last 24 Hrs  T(C): 36.7 (14 Mar 2020 09:37), Max: 37.3 (14 Mar 2020 05:06)  T(F): 98.1 (14 Mar 2020 09:37), Max: 99.1 (14 Mar 2020 05:06)  HR: 90 (14 Mar 2020 09:37) (90 - 115)  BP: 119/77 (14 Mar 2020 09:37) (108/74 - 128/83)  BP(mean): --  RR: 16 (14 Mar 2020 09:37) (16 - 18)  SpO2: 97% (14 Mar 2020 09:37) (94% - 98%)      PHYSICAL EXAM:    General: Well developed; well nourished; in no acute distress  Eyes: moist conjunctivae, sclerae anicteric  HENT: Moist mucous membranes, +tongue fasciculation  Neck: Trachea midline, supple  Lungs: Normal respiratory effort and no intercostal retractions  Cardiovascular: RRR, S1S2  Abdomen: Soft, non-tender non-distended; Normal bowel sounds; No rebound or guarding  Rectal Exam: Scant melenic stool in rectal vault  Extremities: right wrist in brace, no pitting edema  Neurological: Alert and oriented x3, nonfocal exam  Skin: Warm and dry. No obvious rash    LABS:                        6.0    3.87  )-----------( 113      ( 14 Mar 2020 05:43 )             18.4     03-14    144  |  107  |  27<H>  ----------------------------<  114<H>  3.8   |  25  |  0.40<L>    Ca    8.9      14 Mar 2020 05:43  Mg     1.5     03-14    TPro  5.8<L>  /  Alb  3.5  /  TBili  1.2  /  DBili  x   /  AST  45<H>  /  ALT  20  /  AlkPhos  135<H>  03-14        PT/INR - ( 14 Mar 2020 05:43 )   PT: 13.6 sec;   INR: 1.19          PTT - ( 14 Mar 2020 05:43 )  PTT:28.1 sec    RADIOLOGY & ADDITIONAL STUDIES:   cxr reviewed

## 2020-03-14 NOTE — CONSULT NOTE ADULT - SUBJECTIVE AND OBJECTIVE BOX
Patient is a 60y old  Female who presents with a chief complaint of hematemesis    HPI:  Patient is a 60 year old woman with complaining of hematemesis/melena this evening. Patient w/ prior history of GI bleed.    ED Course: T 98,  (range 104-115), /74 (108//74), S 97% on RA, R 18. White count 3.63, hemoglobin 6.8, platelet 107, PTT, PT/INR, PT, potassium 3.3, BUN/crt 29/.56, bilirubin 1.6, alk phos 162, AST/ALT (48/29). Troponin negative, alcohol level <3. GI consulted (Dr. Bates fellow on call) recommending ceftriaxone, octreotide, and PPI.      Allergies    Cipro (Hives; Urticaria)    Intolerances      Home Medications:      SOCIAL HX:     Smoking          ETOH/Illicit drugs          Occupation    PAST MEDICAL & SURGICAL HISTORY:  Pyelonephritis  Gastritis  Lyme disease  No significant past surgical history      FAMILY HISTORY:  No pertinent family history in first degree relatives  :    No known cardiovascular or pulmonary family history     ROS:  See HPI     PHYSICAL EXAM    ICU Vital Signs Last 24 Hrs  T(C): 36.7 (14 Mar 2020 01:55), Max: 36.7 (14 Mar 2020 01:55)  T(F): 98 (14 Mar 2020 01:55), Max: 98 (14 Mar 2020 01:55)  HR: 104 (14 Mar 2020 03:15) (104 - 115)  BP: 117/74 (14 Mar 2020 03:15) (108/74 - 117/74)  BP(mean): --  ABP: --  ABP(mean): --  RR: 16 (14 Mar 2020 03:15) (16 - 18)  SpO2: 98% (14 Mar 2020 03:15) (97% - 98%)      General: Not in distress  HEENT:  XENIA              Lymphatic system: No LN  Lungs: Bilateral BS  Cardiovascular: Regular  Gastrointestinal: Soft, Positive BS  Musculoskeletal: No clubbing.  Moves all extremities.    Skin: Warm.  Intact  Neurological: No motor or sensory deficit         LABS:                          6.8    3.63  )-----------( 107      ( 14 Mar 2020 02:26 )             20.6                                               03-14    140  |  102  |  29<H>  ----------------------------<  156<H>  3.3<L>   |  28  |  0.56    Ca    9.5      14 Mar 2020 02:26    TPro  6.4  /  Alb  3.2<L>  /  TBili  1.6<H>  /  DBili  x   /  AST  48<H>  /  ALT  29  /  AlkPhos  162<H>  03-14      PT/INR - ( 14 Mar 2020 02:26 )   PT: 14.1 sec;   INR: 1.26          PTT - ( 14 Mar 2020 02:26 )  PTT:26.5 sec                                           CARDIAC MARKERS ( 14 Mar 2020 02:26 )  <0.017 ng/mL / x     / 35 U/L / x     / x                                                LIVER FUNCTIONS - ( 14 Mar 2020 02:26 )  Alb: 3.2 g/dL / Pro: 6.4 g/dL / ALK PHOS: 162 U/L / ALT: 29 U/L / AST: 48 U/L / GGT: x                                                                                                                                           CXR:    ECHO:    MEDICATIONS  (STANDING):  folic acid 1 milliGRAM(s) Oral daily  multivitamin 1 Tablet(s) Oral daily  octreotide  Infusion 50 MICROgram(s)/Hr (10 mL/Hr) IV Continuous <Continuous>  pantoprazole  Injectable 40 milliGRAM(s) IV Push every 12 hours  thiamine 100 milliGRAM(s) Oral daily    MEDICATIONS  (PRN): Patient is a 60y old  Female who presents with a chief complaint of hematemesis    HPI:  Patient is a 60 year old woman with complaining of hematemesis/melena this evening. Patient w/ prior history of GI bleed.    ED Course: T 98,  (range 104-115), /74 (108//74), S 97% on RA, R 18. White count 3.63, hemoglobin 6.8, platelet 107, PTT, PT/INR, PT, potassium 3.3, BUN/crt 29/.56, bilirubin 1.6, alk phos 162, AST/ALT (48/29). Troponin negative, alcohol level <3 (per patient had a glass of wine 1 week ago). GI consulted (Dr. Bates fellow on call) recommending ceftriaxone, octreotide, and PPI. ICU consulted at [facility], deemed stable for RMF. Had no hematemesis in the ED or in route.     PAST MEDICAL & SURGICAL HISTORY:  Pyelonephritis  Gastritis  Lyme disease  No significant past surgical history    Allergies  Cipro (Hives; Urticaria)  Intolerances    FAMILY HISTORY:  No pertinent family history in first degree relatives    Social History: lives in 5th floor walk-up. Last drink 1 week ago (glass of wine).     ROS:  See HPI     PHYSICAL EXAM  ICU Vital Signs Last 24 Hrs  T(C): 36.7 (14 Mar 2020 01:55), Max: 36.7 (14 Mar 2020 01:55)  T(F): 98 (14 Mar 2020 01:55), Max: 98 (14 Mar 2020 01:55)  HR: 104 (14 Mar 2020 03:15) (104 - 115)  BP: 117/74 (14 Mar 2020 03:15) (108/74 - 117/74)  BP(mean): --  ABP: --  ABP(mean): --  RR: 16 (14 Mar 2020 03:15) (16 - 18)  SpO2: 98% (14 Mar 2020 03:15) (97% - 98%)      General: Not in distress  HEENT:  XENIA              Lymphatic system: No LN  Lungs: Bilateral BS  Cardiovascular: Regular  Gastrointestinal: Soft, Positive BS  Musculoskeletal: No clubbing.  Moves all extremities.    Skin: Warm.  Intact  Neurological: No motor or sensory deficit         LABS:                          6.8    3.63  )-----------( 107      ( 14 Mar 2020 02:26 )             20.6                                               03-14    140  |  102  |  29<H>  ----------------------------<  156<H>  3.3<L>   |  28  |  0.56    Ca    9.5      14 Mar 2020 02:26    TPro  6.4  /  Alb  3.2<L>  /  TBili  1.6<H>  /  DBili  x   /  AST  48<H>  /  ALT  29  /  AlkPhos  162<H>  03-14      PT/INR - ( 14 Mar 2020 02:26 )   PT: 14.1 sec;   INR: 1.26          PTT - ( 14 Mar 2020 02:26 )  PTT:26.5 sec                                           CARDIAC MARKERS ( 14 Mar 2020 02:26 )  <0.017 ng/mL / x     / 35 U/L / x     / x                                                LIVER FUNCTIONS - ( 14 Mar 2020 02:26 )  Alb: 3.2 g/dL / Pro: 6.4 g/dL / ALK PHOS: 162 U/L / ALT: 29 U/L / AST: 48 U/L / GGT: x                                                                                                                                           CXR:    ECHO:    MEDICATIONS  (STANDING):  folic acid 1 milliGRAM(s) Oral daily  multivitamin 1 Tablet(s) Oral daily  octreotide  Infusion 50 MICROgram(s)/Hr (10 mL/Hr) IV Continuous <Continuous>  pantoprazole  Injectable 40 milliGRAM(s) IV Push every 12 hours  thiamine 100 milliGRAM(s) Oral daily    MEDICATIONS  (PRN): Patient is a 60y old  Female who presents with a chief complaint of hematemesis    HPI:  Patient is a 60 year old woman with past medical history of alcohol abuse, cirrhosis and GI bleed complaining of hematemesis/melena this evening. Denies any associated abdominal pain. Denies NSAID use. Per patient last drink was one week ago when she had a glass of wine. Of note patient was discharged 2/27 from [facility], admitted due to UGIB bleed at that time. Endoscopy with small hiatal hernia and portal hypertensive gastropathy and atrophic duodenal bulb mucosa. Pill endoscopy results to be followed as outpatient. Per patient never found source for bleed on pill endoscopy.  Patient denies fever, chills, or respiratory symptoms. Patient with poor PO intake for two days.  ED Course: T 98,  (range 104-115), /74 (108//74), S 97% on RA, R 18. White count 3.63, hemoglobin 6.8, platelet 107, PTT, PT/INR, PT, potassium 3.3, BUN/crt 29/.56, bilirubin 1.6, alk phos 162, AST/ALT (48/29). Troponin negative, alcohol level <3 (per patient had a glass of wine 1 week ago). GI consulted (Dr. Bates fellow on call) recommending ceftriaxone, octreotide, and PPI. ICU consulted at [facility], deemed stable for RMF. Had no hematemesis in the ED or in route.     PAST MEDICAL & SURGICAL HISTORY:  Pyelonephritis  Gastritis  Lyme disease  No significant past surgical history    MEDICATIONS:  Folic Acid 1mg  Multivitamin QD  Thiamine 100 QD     Allergies  Cipro (Hives; Urticaria)  Intolerances    FAMILY HISTORY:  No pertinent family history in first degree relatives    Social History: lives in 5th floor walk-up. Last drink 1 week ago (glass of wine).     ROS:  See HPI     PHYSICAL EXAM  ICU Vital Signs Last 24 Hrs  T(C): 36.7 (14 Mar 2020 01:55), Max: 36.7 (14 Mar 2020 01:55)  T(F): 98 (14 Mar 2020 01:55), Max: 98 (14 Mar 2020 01:55)  HR: 104 (14 Mar 2020 03:15) (104 - 115)  BP: 117/74 (14 Mar 2020 03:15) (108/74 - 117/74)  BP(mean): --  ABP: --  ABP(mean): --  RR: 16 (14 Mar 2020 03:15) (16 - 18)  SpO2: 98% (14 Mar 2020 03:15) (97% - 98%)      General: Pale slim woman, Not in distress  HEENT:  DMM, no cervical lymphadenopathy            Lungs: Bilateral BS CTAB  Cardiovascular: Tachycardic, regular, no murmurs, or rubs   Gastrointestinal: Soft, Positive BS, no tenderness to palpation   Musculoskeletal: No clubbing.  Moves all extremities.    Skin: Warm.  Intact  Neurological: No motor or sensory deficit         LABS:                          6.8    3.63  )-----------( 107      ( 14 Mar 2020 02:26 )             20.6                                               03-14    140  |  102  |  29<H>  ----------------------------<  156<H>  3.3<L>   |  28  |  0.56    Ca    9.5      14 Mar 2020 02:26    TPro  6.4  /  Alb  3.2<L>  /  TBili  1.6<H>  /  DBili  x   /  AST  48<H>  /  ALT  29  /  AlkPhos  162<H>  03-14      PT/INR - ( 14 Mar 2020 02:26 )   PT: 14.1 sec;   INR: 1.26          PTT - ( 14 Mar 2020 02:26 )  PTT:26.5 sec                                           CARDIAC MARKERS ( 14 Mar 2020 02:26 )  <0.017 ng/mL / x     / 35 U/L / x     / x                                                LIVER FUNCTIONS - ( 14 Mar 2020 02:26 )  Alb: 3.2 g/dL / Pro: 6.4 g/dL / ALK PHOS: 162 U/L / ALT: 29 U/L / AST: 48 U/L / GGT: x

## 2020-03-14 NOTE — H&P ADULT - PROBLEM SELECTOR PLAN 1
Pt with prior hx GI bleed presenting with hematemesis/melena. Melena present on JACLYN. ICU consulted, deemed stable for RMF. Pt has not had episode of melena since admission. GI consulted at [facility], recommended octreotide, ceftriaxone.  - c/w ceftriaxone for SBP ppx  - PPI BID  - 1 unit PRBC ordered  - c/w octreotide   - 1 unit PRBCs ordered  - maintain active t&S  - transfuse for Hgb < 7  - GI recs appreciated

## 2020-03-14 NOTE — ED ADULT TRIAGE NOTE - CHIEF COMPLAINT QUOTE
pt complains of episodes of hematemesis/melena this evening. Hx of GI bleed. Currently takes tramadol for pain.

## 2020-03-14 NOTE — H&P ADULT - PROBLEM SELECTOR PLAN 2
[facility], repeat Hgb at [facility] 6. MCV 98.4  Anemia likely 2/2 GI bleed at pt presenting with hematemesis/melena.  - 1 unit PRBCs ordered  - maintain active t&S  - transfuse for Hgb < 7

## 2020-03-14 NOTE — PROVIDER CONTACT NOTE (CRITICAL VALUE NOTIFICATION) - SITUATION
Hgb dropped from 6.8 and Hct was 20.6 from previous labs. 1 unit of PRBC ordered. Waiting for Type and screen results.

## 2020-03-15 LAB
ALBUMIN SERPL ELPH-MCNC: 3.2 G/DL — LOW (ref 3.3–5)
ALP SERPL-CCNC: 122 U/L — HIGH (ref 40–120)
ALT FLD-CCNC: 18 U/L — SIGNIFICANT CHANGE UP (ref 10–45)
ANION GAP SERPL CALC-SCNC: 10 MMOL/L — SIGNIFICANT CHANGE UP (ref 5–17)
AST SERPL-CCNC: 44 U/L — HIGH (ref 10–40)
BASOPHILS # BLD AUTO: 0.02 K/UL — SIGNIFICANT CHANGE UP (ref 0–0.2)
BASOPHILS NFR BLD AUTO: 0.7 % — SIGNIFICANT CHANGE UP (ref 0–2)
BILIRUB SERPL-MCNC: 1.7 MG/DL — HIGH (ref 0.2–1.2)
BLD GP AB SCN SERPL QL: NEGATIVE — SIGNIFICANT CHANGE UP
BUN SERPL-MCNC: 17 MG/DL — SIGNIFICANT CHANGE UP (ref 7–23)
CALCIUM SERPL-MCNC: 9 MG/DL — SIGNIFICANT CHANGE UP (ref 8.4–10.5)
CHLORIDE SERPL-SCNC: 107 MMOL/L — SIGNIFICANT CHANGE UP (ref 96–108)
CO2 SERPL-SCNC: 25 MMOL/L — SIGNIFICANT CHANGE UP (ref 22–31)
CREAT SERPL-MCNC: 0.63 MG/DL — SIGNIFICANT CHANGE UP (ref 0.5–1.3)
EOSINOPHIL # BLD AUTO: 0.1 K/UL — SIGNIFICANT CHANGE UP (ref 0–0.5)
EOSINOPHIL NFR BLD AUTO: 3.5 % — SIGNIFICANT CHANGE UP (ref 0–6)
GLUCOSE SERPL-MCNC: 109 MG/DL — HIGH (ref 70–99)
HCT VFR BLD CALC: 24.1 % — LOW (ref 34.5–45)
HGB BLD-MCNC: 7.9 G/DL — LOW (ref 11.5–15.5)
IMM GRANULOCYTES NFR BLD AUTO: 0 % — SIGNIFICANT CHANGE UP (ref 0–1.5)
LYMPHOCYTES # BLD AUTO: 0.78 K/UL — LOW (ref 1–3.3)
LYMPHOCYTES # BLD AUTO: 27.5 % — SIGNIFICANT CHANGE UP (ref 13–44)
MAGNESIUM SERPL-MCNC: 1.4 MG/DL — LOW (ref 1.6–2.6)
MCHC RBC-ENTMCNC: 31.2 PG — SIGNIFICANT CHANGE UP (ref 27–34)
MCHC RBC-ENTMCNC: 32.8 GM/DL — SIGNIFICANT CHANGE UP (ref 32–36)
MCV RBC AUTO: 95.3 FL — SIGNIFICANT CHANGE UP (ref 80–100)
MONOCYTES # BLD AUTO: 0.31 K/UL — SIGNIFICANT CHANGE UP (ref 0–0.9)
MONOCYTES NFR BLD AUTO: 10.9 % — SIGNIFICANT CHANGE UP (ref 2–14)
NEUTROPHILS # BLD AUTO: 1.63 K/UL — LOW (ref 1.8–7.4)
NEUTROPHILS NFR BLD AUTO: 57.4 % — SIGNIFICANT CHANGE UP (ref 43–77)
NRBC # BLD: 0 /100 WBCS — SIGNIFICANT CHANGE UP (ref 0–0)
PHOSPHATE SERPL-MCNC: 4.2 MG/DL — SIGNIFICANT CHANGE UP (ref 2.5–4.5)
PLATELET # BLD AUTO: 69 K/UL — LOW (ref 150–400)
POTASSIUM SERPL-MCNC: 4.1 MMOL/L — SIGNIFICANT CHANGE UP (ref 3.5–5.3)
POTASSIUM SERPL-SCNC: 4.1 MMOL/L — SIGNIFICANT CHANGE UP (ref 3.5–5.3)
PROT SERPL-MCNC: 5.4 G/DL — LOW (ref 6–8.3)
RBC # BLD: 2.53 M/UL — LOW (ref 3.8–5.2)
RBC # FLD: 16.4 % — HIGH (ref 10.3–14.5)
RH IG SCN BLD-IMP: NEGATIVE — SIGNIFICANT CHANGE UP
SODIUM SERPL-SCNC: 142 MMOL/L — SIGNIFICANT CHANGE UP (ref 135–145)
WBC # BLD: 2.84 K/UL — LOW (ref 3.8–10.5)
WBC # FLD AUTO: 2.84 K/UL — LOW (ref 3.8–10.5)

## 2020-03-15 PROCEDURE — 99232 SBSQ HOSP IP/OBS MODERATE 35: CPT | Mod: GC

## 2020-03-15 PROCEDURE — 73100 X-RAY EXAM OF WRIST: CPT | Mod: 26,RT

## 2020-03-15 RX ORDER — HYDROMORPHONE HYDROCHLORIDE 2 MG/ML
0.5 INJECTION INTRAMUSCULAR; INTRAVENOUS; SUBCUTANEOUS ONCE
Refills: 0 | Status: DISCONTINUED | OUTPATIENT
Start: 2020-03-15 | End: 2020-03-15

## 2020-03-15 RX ORDER — ONDANSETRON 8 MG/1
4 TABLET, FILM COATED ORAL EVERY 8 HOURS
Refills: 0 | Status: DISCONTINUED | OUTPATIENT
Start: 2020-03-15 | End: 2020-03-17

## 2020-03-15 RX ADMIN — HYDROMORPHONE HYDROCHLORIDE 0.5 MILLIGRAM(S): 2 INJECTION INTRAMUSCULAR; INTRAVENOUS; SUBCUTANEOUS at 23:05

## 2020-03-15 RX ADMIN — ONDANSETRON 4 MILLIGRAM(S): 8 TABLET, FILM COATED ORAL at 18:41

## 2020-03-15 RX ADMIN — Medication 1 TABLET(S): at 11:19

## 2020-03-15 RX ADMIN — Medication 1 MILLIGRAM(S): at 11:19

## 2020-03-15 RX ADMIN — OCTREOTIDE ACETATE 10 MICROGRAM(S)/HR: 200 INJECTION, SOLUTION INTRAVENOUS; SUBCUTANEOUS at 22:00

## 2020-03-15 RX ADMIN — OCTREOTIDE ACETATE 10 MICROGRAM(S)/HR: 200 INJECTION, SOLUTION INTRAVENOUS; SUBCUTANEOUS at 01:10

## 2020-03-15 RX ADMIN — PANTOPRAZOLE SODIUM 40 MILLIGRAM(S): 20 TABLET, DELAYED RELEASE ORAL at 05:21

## 2020-03-15 RX ADMIN — CEFTRIAXONE 100 MILLIGRAM(S): 500 INJECTION, POWDER, FOR SOLUTION INTRAMUSCULAR; INTRAVENOUS at 21:44

## 2020-03-15 RX ADMIN — Medication 650 MILLIGRAM(S): at 05:36

## 2020-03-15 RX ADMIN — Medication 650 MILLIGRAM(S): at 18:10

## 2020-03-15 RX ADMIN — Medication 100 MILLIGRAM(S): at 11:19

## 2020-03-15 RX ADMIN — Medication 650 MILLIGRAM(S): at 04:36

## 2020-03-15 RX ADMIN — HYDROMORPHONE HYDROCHLORIDE 0.5 MILLIGRAM(S): 2 INJECTION INTRAMUSCULAR; INTRAVENOUS; SUBCUTANEOUS at 23:55

## 2020-03-15 RX ADMIN — PANTOPRAZOLE SODIUM 40 MILLIGRAM(S): 20 TABLET, DELAYED RELEASE ORAL at 18:11

## 2020-03-15 RX ADMIN — Medication 650 MILLIGRAM(S): at 20:00

## 2020-03-15 RX ADMIN — OCTREOTIDE ACETATE 10 MICROGRAM(S)/HR: 200 INJECTION, SOLUTION INTRAVENOUS; SUBCUTANEOUS at 11:19

## 2020-03-15 NOTE — OCCUPATIONAL THERAPY INITIAL EVALUATION ADULT - RANGE OF MOTION EXAMINATION, UPPER EXTREMITY
Left UE Active ROM was WFL (within functional limits)/RUE shoulder flexion limited to approximately 90 degrees, R shoulder abduction WFLs, R elbow flexion/extension WFLs, deferred R wrist/ digits at this time.

## 2020-03-15 NOTE — OCCUPATIONAL THERAPY INITIAL EVALUATION ADULT - MD ORDER
Per chart, 60F with PMH EtOH abuse, cirrhosis, anemia, prior GI bleeds, R wrist break, who presents with hematemesis/melena this evening. Of note she was recently admitted to [facility] (Feb 27 2019) for GI bleed/melena. EGD performed during that admission did not find source of acute bleed but did find small hiatal hernia, protal hypertensive gastropathy, duodenitis and 1-2 submucosal gastric lesion.

## 2020-03-15 NOTE — PROGRESS NOTE ADULT - SUBJECTIVE AND OBJECTIVE BOX
Pt seen and examined at bedside.  Patient reports that she is feeling better.  No further hematemesis or melena.  Denies fever, chill, chest pain, shortness of breath, abdominal or epigastric pain, nausea, vomiting.  Discussed at length about patient's cirrhosis.  She was inquiring about etiology and other potential causes such as abx.    Allergies    Cipro (Hives; Urticaria)    Intolerances      MEDICATIONS:  MEDICATIONS  (STANDING):  cefTRIAXone   IVPB 1000 milliGRAM(s) IV Intermittent every 24 hours  folic acid 1 milliGRAM(s) Oral daily  influenza   Vaccine 0.5 milliLiter(s) IntraMuscular once  multivitamin 1 Tablet(s) Oral daily  octreotide  Infusion 50 MICROgram(s)/Hr (10 mL/Hr) IV Continuous <Continuous>  pantoprazole  Injectable 40 milliGRAM(s) IV Push every 12 hours  thiamine 100 milliGRAM(s) Oral daily    MEDICATIONS  (PRN):  acetaminophen   Tablet .. 650 milliGRAM(s) Oral every 6 hours PRN Mild Pain (1 - 3)    Vital Signs Last 24 Hrs  T(C): 37.3 (15 Mar 2020 09:27), Max: 37.3 (14 Mar 2020 16:13)  T(F): 99.1 (15 Mar 2020 09:27), Max: 99.2 (14 Mar 2020 16:13)  HR: 74 (15 Mar 2020 09:27) (74 - 87)  BP: 113/68 (15 Mar 2020 09:27) (113/68 - 127/78)  BP(mean): --  RR: 18 (15 Mar 2020 09:27) (16 - 18)  SpO2: 95% (15 Mar 2020 09:27) (95% - 97%)    03-14 @ 07:01  -  03-15 @ 07:00  --------------------------------------------------------  IN: 970 mL / OUT: 400 mL / NET: 570 mL      PHYSICAL EXAM:    General: thin female, appears comfortable in bed; in no acute distress  HEENT: MMM, conjunctiva and sclera clear  Gastrointestinal: Soft non-tender non-distended; Normal bowel sounds;  Skin: Warm and dry. No obvious rash    LABS:                        7.9    2.84  )-----------( 69       ( 15 Mar 2020 07:47 )             24.1     03-15    142  |  107  |  17  ----------------------------<  109<H>  4.1   |  25  |  0.63    Ca    9.0      15 Mar 2020 07:47  Phos  4.2     03-15  Mg     1.4     03-15    TPro  5.4<L>  /  Alb  3.2<L>  /  TBili  1.7<H>  /  DBili  x   /  AST  44<H>  /  ALT  18  /  AlkPhos  122<H>  03-15    PT/INR - ( 14 Mar 2020 05:43 )   PT: 13.6 sec;   INR: 1.19          PTT - ( 14 Mar 2020 05:43 )  PTT:28.1 sec      RADIOLOGY & ADDITIONAL STUDIES: no new imaging

## 2020-03-15 NOTE — PHYSICAL THERAPY INITIAL EVALUATION ADULT - DISCHARGE DISPOSITION, PT EVAL
pending further functional progress and assessment of stairs negotiation./home w/ outpatient services

## 2020-03-15 NOTE — OCCUPATIONAL THERAPY INITIAL EVALUATION ADULT - GENERAL OBSERVATIONS, REHAB EVAL
R hand dominant, patient cleared for OT by MD Stock. Patient received semi-supine, c/o pain in R hip/R wrist, +heplock, R wrist splint doffed at this time 2/2 skin changes.

## 2020-03-15 NOTE — PHYSICAL THERAPY INITIAL EVALUATION ADULT - ADDITIONAL COMMENTS
Pt. reports intermittent use of cane, has 3 flights of stairs to negotiate, railing on R hand side going down,

## 2020-03-15 NOTE — OCCUPATIONAL THERAPY INITIAL EVALUATION ADULT - STANDING BALANCE: DYNAMIC, REHAB EVAL
Patient ambulated approximately 100 ft w/ CG assist, no LOB, decreased step clearance RLE./fair minus

## 2020-03-15 NOTE — OCCUPATIONAL THERAPY INITIAL EVALUATION ADULT - PLANNED THERAPY INTERVENTIONS, OT EVAL
motor coordination training/transfer training/strengthening/ADL retraining/fine motor coordination training/balance training/neuromuscular re-education/ROM

## 2020-03-15 NOTE — PROGRESS NOTE ADULT - SUBJECTIVE AND OBJECTIVE BOX
OVERNIGHT EVENTS:    SUBJECTIVE / INTERVAL HPI: Patient seen and examined at bedside.     VITAL SIGNS:  Vital Signs Last 24 Hrs  T(C): 37.3 (15 Mar 2020 09:27), Max: 37.3 (14 Mar 2020 16:13)  T(F): 99.1 (15 Mar 2020 09:27), Max: 99.2 (14 Mar 2020 16:13)  HR: 74 (15 Mar 2020 09:27) (74 - 87)  BP: 113/68 (15 Mar 2020 09:27) (113/68 - 127/78)  BP(mean): --  RR: 18 (15 Mar 2020 09:27) (16 - 18)  SpO2: 95% (15 Mar 2020 09:27) (95% - 97%)    PHYSICAL EXAM:    General: WDWN  HEENT: NC/AT; PERRL, anicteric sclera; MMM  Neck: supple  Cardiovascular: +S1/S2; RRR  Respiratory: CTA B/L; no W/R/R  Gastrointestinal: soft, NT/ND; +BSx4  Extremities: WWP; no edema, clubbing or cyanosis  Vascular: 2+ radial, DP/PT pulses B/L  Neurological: AAOx3; no focal deficits    MEDICATIONS:  MEDICATIONS  (STANDING):  cefTRIAXone   IVPB 1000 milliGRAM(s) IV Intermittent every 24 hours  folic acid 1 milliGRAM(s) Oral daily  influenza   Vaccine 0.5 milliLiter(s) IntraMuscular once  multivitamin 1 Tablet(s) Oral daily  octreotide  Infusion 50 MICROgram(s)/Hr (10 mL/Hr) IV Continuous <Continuous>  pantoprazole  Injectable 40 milliGRAM(s) IV Push every 12 hours  thiamine 100 milliGRAM(s) Oral daily    MEDICATIONS  (PRN):  acetaminophen   Tablet .. 650 milliGRAM(s) Oral every 6 hours PRN Mild Pain (1 - 3)      ALLERGIES:  Allergies    Cipro (Hives; Urticaria)    Intolerances        LABS:                        7.9    2.84  )-----------( 69       ( 15 Mar 2020 07:47 )             24.1     03-15    142  |  107  |  17  ----------------------------<  109<H>  4.1   |  25  |  0.63    Ca    9.0      15 Mar 2020 07:47  Phos  4.2     03-15  Mg     1.4     03-15    TPro  5.4<L>  /  Alb  3.2<L>  /  TBili  1.7<H>  /  DBili  x   /  AST  44<H>  /  ALT  18  /  AlkPhos  122<H>  03-15    PT/INR - ( 14 Mar 2020 05:43 )   PT: 13.6 sec;   INR: 1.19          PTT - ( 14 Mar 2020 05:43 )  PTT:28.1 sec    CAPILLARY BLOOD GLUCOSE          RADIOLOGY & ADDITIONAL TESTS: Reviewed. OVERNIGHT EVENTS:    SUBJECTIVE / INTERVAL HPI: Patient seen and examined at bedside.     VITAL SIGNS:  Vital Signs Last 24 Hrs  T(C): 37.3 (15 Mar 2020 09:27), Max: 37.3 (14 Mar 2020 16:13)  T(F): 99.1 (15 Mar 2020 09:27), Max: 99.2 (14 Mar 2020 16:13)  HR: 74 (15 Mar 2020 09:27) (74 - 87)  BP: 113/68 (15 Mar 2020 09:27) (113/68 - 127/78)  BP(mean): --  RR: 18 (15 Mar 2020 09:27) (16 - 18)  SpO2: 95% (15 Mar 2020 09:27) (95% - 97%)    PHYSICAL EXAM:    General: WDWN  HEENT: NC/AT; PERRL, anicteric sclera; MMM  Neck: supple  Cardiovascular: +S1/S2; RRR  Respiratory: CTA B/L; no W/R/R  Gastrointestinal: soft, NT/ND; +BSx4  Extremities: WWP; no edema, clubbing or cyanosis  Vascular: 2+ radial, DP/PT pulses B/L  Neurological: AAOx3; no focal deficits, no asterixis     MEDICATIONS:  MEDICATIONS  (STANDING):  cefTRIAXone   IVPB 1000 milliGRAM(s) IV Intermittent every 24 hours  folic acid 1 milliGRAM(s) Oral daily  influenza   Vaccine 0.5 milliLiter(s) IntraMuscular once  multivitamin 1 Tablet(s) Oral daily  octreotide  Infusion 50 MICROgram(s)/Hr (10 mL/Hr) IV Continuous <Continuous>  pantoprazole  Injectable 40 milliGRAM(s) IV Push every 12 hours  thiamine 100 milliGRAM(s) Oral daily    MEDICATIONS  (PRN):  acetaminophen   Tablet .. 650 milliGRAM(s) Oral every 6 hours PRN Mild Pain (1 - 3)      ALLERGIES:  Allergies    Cipro (Hives; Urticaria)    Intolerances        LABS:                        7.9    2.84  )-----------( 69       ( 15 Mar 2020 07:47 )             24.1     03-15    142  |  107  |  17  ----------------------------<  109<H>  4.1   |  25  |  0.63    Ca    9.0      15 Mar 2020 07:47  Phos  4.2     03-15  Mg     1.4     03-15    TPro  5.4<L>  /  Alb  3.2<L>  /  TBili  1.7<H>  /  DBili  x   /  AST  44<H>  /  ALT  18  /  AlkPhos  122<H>  03-15    PT/INR - ( 14 Mar 2020 05:43 )   PT: 13.6 sec;   INR: 1.19          PTT - ( 14 Mar 2020 05:43 )  PTT:28.1 sec    CAPILLARY BLOOD GLUCOSE          RADIOLOGY & ADDITIONAL TESTS: Reviewed. OVERNIGHT EVENTS: No acute events overnight.     SUBJECTIVE / INTERVAL HPI: Patient seen and examined at bedside. States that she has not had any more hematemesis or melena/hematochezia. Last bowel movement was on morning of admission. States that she also no longer feels lightheaded when she sits or stands up. Denies fevers/chills, headaches, chest pain, sob, abdominal pain, nausea/vomiting.    VITAL SIGNS:  Vital Signs Last 24 Hrs  T(C): 37.3 (15 Mar 2020 09:27), Max: 37.3 (14 Mar 2020 16:13)  T(F): 99.1 (15 Mar 2020 09:27), Max: 99.2 (14 Mar 2020 16:13)  HR: 74 (15 Mar 2020 09:27) (74 - 87)  BP: 113/68 (15 Mar 2020 09:27) (113/68 - 127/78)  BP(mean): --  RR: 18 (15 Mar 2020 09:27) (16 - 18)  SpO2: 95% (15 Mar 2020 09:27) (95% - 97%)    PHYSICAL EXAM:    General: Laying comfortably in bed, NAD  HEENT: NC/AT; PERRL, anicteric sclera; MMM  Neck: supple  Cardiovascular: +S1/S2; RRR  Respiratory: CTA B/L; no W/R/R  Gastrointestinal: soft, NT/ND; +BSx4  Extremities: WWP; no edema, clubbing or cyanosis  Vascular: 2+ radial, DP/PT pulses B/L  Neurological: AAOx3; no focal deficits, no asterixis     MEDICATIONS:  MEDICATIONS  (STANDING):  cefTRIAXone   IVPB 1000 milliGRAM(s) IV Intermittent every 24 hours  folic acid 1 milliGRAM(s) Oral daily  influenza   Vaccine 0.5 milliLiter(s) IntraMuscular once  multivitamin 1 Tablet(s) Oral daily  octreotide  Infusion 50 MICROgram(s)/Hr (10 mL/Hr) IV Continuous <Continuous>  pantoprazole  Injectable 40 milliGRAM(s) IV Push every 12 hours  thiamine 100 milliGRAM(s) Oral daily    MEDICATIONS  (PRN):  acetaminophen   Tablet .. 650 milliGRAM(s) Oral every 6 hours PRN Mild Pain (1 - 3)      ALLERGIES:  Allergies    Cipro (Hives; Urticaria)    Intolerances        LABS:                        7.9    2.84  )-----------( 69       ( 15 Mar 2020 07:47 )             24.1     03-15    142  |  107  |  17  ----------------------------<  109<H>  4.1   |  25  |  0.63    Ca    9.0      15 Mar 2020 07:47  Phos  4.2     03-15  Mg     1.4     03-15    TPro  5.4<L>  /  Alb  3.2<L>  /  TBili  1.7<H>  /  DBili  x   /  AST  44<H>  /  ALT  18  /  AlkPhos  122<H>  03-15    PT/INR - ( 14 Mar 2020 05:43 )   PT: 13.6 sec;   INR: 1.19          PTT - ( 14 Mar 2020 05:43 )  PTT:28.1 sec    CAPILLARY BLOOD GLUCOSE          RADIOLOGY & ADDITIONAL TESTS: Reviewed.

## 2020-03-15 NOTE — OCCUPATIONAL THERAPY INITIAL EVALUATION ADULT - ADDITIONAL COMMENTS
Patient prior to fall in January reports independence in all functional mobility and ADLs w/o AD/AE. Since R wrist surgery patient reports she ambulates w/o device at this time, however w/ difficulty ambulating on stairs, states she sits and bumps down the steps if necessary. Patient states she is able to complete ADLs, requires assistance for grooming, and to dress self w/ LUE. Patient states she has assistance from friends as needed.

## 2020-03-15 NOTE — PHYSICAL THERAPY INITIAL EVALUATION ADULT - PERTINENT HX OF CURRENT PROBLEM, REHAB EVAL
Pt. is a 60 y.o female presenting with hematemesis and anemia  for further UGIB. Pt. is a 60 y.o female presenting with hematemesis and anemia  for further UGIB. Of note, pt. recently sustained  R (dominant UE) wrist Fx , currently still in splint.

## 2020-03-15 NOTE — PROGRESS NOTE ADULT - ATTENDING COMMENTS
Patient seen and examined at bedside. Agree with the history, physical exam, assessment, and plan as outlined above with the following addendum. Briefly patient is a 60F with PMH EtOH abuse, cirrhosis (-PSE, -EV, -ascites), recent admission 2/25-2/27/20 for hematemesis, who now presents with UGIB    Physical exam as per above. VSS. Labs reviewed     #Anemia secondary to GI blood loss- pt reports history of hematemesis and melena. VSS. Since admission, pt has remained stable and there are no active signs of GIB at this time. Patient has received 2 units prbc. Hemoglobin stable at 7.9  Source of bleed is likely portal hypertensive gastropathy, as was seen on EGD in feb. Less likely varices as she did not have varices on recent egd  -C/w IV PPI BID, octreotide, ceftriaxone ( given history of cirrhosis)  -GI following- will go for EGD tomorrow; npo at midnight     #Compensated cirrhosis 2/2 ETOH use ( - EV, -Ascites,- PSE, - HCC), MELD NA 9  Ascites- none on exam  EV: no varices on EGD in Feb  PSE: no signs, A0X3  HCC: no lesions on abd US in Dev 2019  -GI following, to scope tomorrow Patient seen and examined at bedside. Agree with the history, physical exam, assessment, and plan as outlined above with the following addendum. Briefly patient is a 60F with PMH EtOH abuse, cirrhosis (-PSE, -EV, -ascites), recent admission 2/25-2/27/20 for hematemesis, who now presents with UGIB    Physical exam as per above. VSS. Labs reviewed     #Anemia secondary to GI blood loss- pt reports history of hematemesis and melena. VSS. Since admission, pt has remained stable and there are no active signs of GIB at this time. Patient has received 2 units prbc. Hemoglobin stable at 7.9  Source of bleed is likely portal hypertensive gastropathy, as was seen on EGD in feb. Less likely varices as she did not have varices on recent egd  -C/w IV PPI BID, octreotide, ceftriaxone ( given history of cirrhosis)  -GI following- will go for EGD tomorrow; npo at midnight     #Compensated cirrhosis 2/2 ETOH use ( - EV, -Ascites,- PSE, - HCC), MELD NA 9  Ascites- none on exam  EV: no varices on EGD in Feb  PSE: no signs, A0X3  HCC: no lesions on abd US in Dev 2019  ETOH cessation. Folic acid, thiamine  -GI following, to scope tomorrow

## 2020-03-16 ENCOUNTER — TRANSCRIPTION ENCOUNTER (OUTPATIENT)
Age: 60
End: 2020-03-16

## 2020-03-16 DIAGNOSIS — E87.6 HYPOKALEMIA: ICD-10-CM

## 2020-03-16 DIAGNOSIS — E83.42 HYPOMAGNESEMIA: ICD-10-CM

## 2020-03-16 DIAGNOSIS — S52.501A UNSPECIFIED FRACTURE OF THE LOWER END OF RIGHT RADIUS, INITIAL ENCOUNTER FOR CLOSED FRACTURE: ICD-10-CM

## 2020-03-16 DIAGNOSIS — D62 ACUTE POSTHEMORRHAGIC ANEMIA: ICD-10-CM

## 2020-03-16 DIAGNOSIS — D61.818 OTHER PANCYTOPENIA: ICD-10-CM

## 2020-03-16 DIAGNOSIS — K70.30 ALCOHOLIC CIRRHOSIS OF LIVER WITHOUT ASCITES: ICD-10-CM

## 2020-03-16 LAB
ANION GAP SERPL CALC-SCNC: 10 MMOL/L — SIGNIFICANT CHANGE UP (ref 5–17)
APTT BLD: 31 SEC — SIGNIFICANT CHANGE UP (ref 27.5–36.3)
BUN SERPL-MCNC: 9 MG/DL — SIGNIFICANT CHANGE UP (ref 7–23)
CALCIUM SERPL-MCNC: 8.3 MG/DL — LOW (ref 8.4–10.5)
CHLORIDE SERPL-SCNC: 105 MMOL/L — SIGNIFICANT CHANGE UP (ref 96–108)
CO2 SERPL-SCNC: 24 MMOL/L — SIGNIFICANT CHANGE UP (ref 22–31)
CREAT SERPL-MCNC: 0.57 MG/DL — SIGNIFICANT CHANGE UP (ref 0.5–1.3)
GLUCOSE SERPL-MCNC: 99 MG/DL — SIGNIFICANT CHANGE UP (ref 70–99)
HCT VFR BLD CALC: 25.9 % — LOW (ref 34.5–45)
HGB BLD-MCNC: 8.3 G/DL — LOW (ref 11.5–15.5)
INR BLD: 1.14 — SIGNIFICANT CHANGE UP (ref 0.88–1.16)
MAGNESIUM SERPL-MCNC: 1.5 MG/DL — LOW (ref 1.6–2.6)
MCHC RBC-ENTMCNC: 31.3 PG — SIGNIFICANT CHANGE UP (ref 27–34)
MCHC RBC-ENTMCNC: 32 GM/DL — SIGNIFICANT CHANGE UP (ref 32–36)
MCV RBC AUTO: 97.7 FL — SIGNIFICANT CHANGE UP (ref 80–100)
NRBC # BLD: 0 /100 WBCS — SIGNIFICANT CHANGE UP (ref 0–0)
PLATELET # BLD AUTO: 83 K/UL — LOW (ref 150–400)
POTASSIUM SERPL-MCNC: 3.4 MMOL/L — LOW (ref 3.5–5.3)
POTASSIUM SERPL-SCNC: 3.4 MMOL/L — LOW (ref 3.5–5.3)
PROTHROM AB SERPL-ACNC: 13 SEC — HIGH (ref 10–12.9)
RBC # BLD: 2.65 M/UL — LOW (ref 3.8–5.2)
RBC # FLD: 16 % — HIGH (ref 10.3–14.5)
SODIUM SERPL-SCNC: 139 MMOL/L — SIGNIFICANT CHANGE UP (ref 135–145)
WBC # BLD: 2.76 K/UL — LOW (ref 3.8–10.5)
WBC # FLD AUTO: 2.76 K/UL — LOW (ref 3.8–10.5)

## 2020-03-16 PROCEDURE — 43235 EGD DIAGNOSTIC BRUSH WASH: CPT

## 2020-03-16 PROCEDURE — 99233 SBSQ HOSP IP/OBS HIGH 50: CPT | Mod: GC

## 2020-03-16 RX ORDER — PROPRANOLOL HCL 160 MG
1 CAPSULE, EXTENDED RELEASE 24HR ORAL
Qty: 60 | Refills: 2
Start: 2020-03-16 | End: 2020-06-13

## 2020-03-16 RX ORDER — MAGNESIUM SULFATE 500 MG/ML
4 VIAL (ML) INJECTION ONCE
Refills: 0 | Status: COMPLETED | OUTPATIENT
Start: 2020-03-16 | End: 2020-03-16

## 2020-03-16 RX ORDER — PANTOPRAZOLE SODIUM 20 MG/1
1 TABLET, DELAYED RELEASE ORAL
Qty: 30 | Refills: 2
Start: 2020-03-16 | End: 2020-06-13

## 2020-03-16 RX ORDER — POTASSIUM CHLORIDE 20 MEQ
40 PACKET (EA) ORAL ONCE
Refills: 0 | Status: COMPLETED | OUTPATIENT
Start: 2020-03-16 | End: 2020-03-16

## 2020-03-16 RX ORDER — TRAMADOL HYDROCHLORIDE 50 MG/1
25 TABLET ORAL ONCE
Refills: 0 | Status: DISCONTINUED | OUTPATIENT
Start: 2020-03-16 | End: 2020-03-16

## 2020-03-16 RX ORDER — PANTOPRAZOLE SODIUM 20 MG/1
40 TABLET, DELAYED RELEASE ORAL
Refills: 0 | Status: DISCONTINUED | OUTPATIENT
Start: 2020-03-16 | End: 2020-03-17

## 2020-03-16 RX ADMIN — Medication 40 MILLIEQUIVALENT(S): at 14:33

## 2020-03-16 RX ADMIN — Medication 100 MILLIGRAM(S): at 11:34

## 2020-03-16 RX ADMIN — Medication 1 TABLET(S): at 11:34

## 2020-03-16 RX ADMIN — TRAMADOL HYDROCHLORIDE 25 MILLIGRAM(S): 50 TABLET ORAL at 16:55

## 2020-03-16 RX ADMIN — PANTOPRAZOLE SODIUM 40 MILLIGRAM(S): 20 TABLET, DELAYED RELEASE ORAL at 05:09

## 2020-03-16 RX ADMIN — TRAMADOL HYDROCHLORIDE 25 MILLIGRAM(S): 50 TABLET ORAL at 15:55

## 2020-03-16 RX ADMIN — OCTREOTIDE ACETATE 10 MICROGRAM(S)/HR: 200 INJECTION, SOLUTION INTRAVENOUS; SUBCUTANEOUS at 07:55

## 2020-03-16 RX ADMIN — Medication 100 GRAM(S): at 12:44

## 2020-03-16 RX ADMIN — Medication 1 MILLIGRAM(S): at 11:34

## 2020-03-16 NOTE — DISCHARGE NOTE NURSING/CASE MANAGEMENT/SOCIAL WORK - PATIENT PORTAL LINK FT
You can access the FollowMyHealth Patient Portal offered by [facility] by registering at the following website: [email or web address]. By joining [facility]’s FollowMyHealth portal, you will also be able to view your health information using other applications (apps) compatible with our system.

## 2020-03-16 NOTE — DISCHARGE NOTE PROVIDER - NSDCMRMEDTOKEN_GEN_ALL_CORE_FT
folic acid 1 mg oral tablet: 1 tab(s) orally once a day   Multiple Vitamins oral tablet: 1 tab(s) orally once a day   thiamine 100 mg oral tablet: 1 tab(s) orally once a day folic acid 1 mg oral tablet: 1 tab(s) orally once a day   Multiple Vitamins oral tablet: 1 tab(s) orally once a day   pantoprazole 40 mg oral delayed release tablet: 1 tab(s) orally once a day  propranolol 20 mg oral tablet: 1 tab(s) orally 2 times a day   thiamine 100 mg oral tablet: 1 tab(s) orally once a day

## 2020-03-16 NOTE — DISCHARGE NOTE PROVIDER - HOSPITAL COURSE
#Discharge: do not delete        Ms. Avery is a 60F with PMH EtOH abuse, cirrhosis, anemia, prior GI bleeds, R wrist break, who presented with hematemesis/melena, found to have portal hypertension gastropathy.         Problem List/Main Diagnoses (system-based):     1) Portal hypertension gastropathy-     Inpatient treatment course:     New medications:     Labs to be followed outpatient:     Exam to be followed outpatient: #Discharge: do not delete        Ms. Avery is a 60F with PMH EtOH abuse, cirrhosis, anemia, prior GI bleeds, R wrist break, who presented with hematemesis/melena, found to have portal hypertension gastropathy.         Problem List/Main Diagnoses (system-based):     1) Portal hypertension gastropathy- pantoprazole, propranolol    2) Wrist fracture        Inpatient treatment course:     New medications:     Labs to be followed outpatient:     Exam to be followed outpatient: #Discharge: do not delete        Ms. Avery is a 60F with PMH EtOH abuse, cirrhosis, anemia, prior GI bleeds, R wrist break, who presented with hematemesis/melena, found to have portal hypertension gastropathy.         Problem List/Main Diagnoses (system-based):     1) Portal hypertension gastropathy- pantoprazole, propranolol    2) Wrist fracture- f/u with outpatient orthopedist        Inpatient treatment course:     New medications:     Labs to be followed outpatient:     Exam to be followed outpatient: #Discharge: do not delete        Ms. Avery is a 60F with PMH EtOH abuse, cirrhosis, anemia, prior GI bleeds, R wrist break, who presented with hematemesis/melena, found to have portal hypertension gastropathy.         Problem List/Main Diagnoses (system-based):     1) Portal hypertension gastropathy- pantoprazole, propranolol    2) Wrist fracture- f/u with outpatient orthopedist        Inpatient treatment course: 60F with PMH EtOH abuse, cirrhosis, anemia, prior GI bleeds, R wrist break, who presented with hematemesis/melena, found to have portal hypertension gastropathy. Patient found to have hemoglobin of 6.8 on admission, required 2 units of prbcs while in the hospital with appropriate response. Started on PPI, octreotide and ceftriaxone (for SBP prophylaxis). GI performed an endoscopy which only showed portal hypertension gastropathy. Per GI recs, started on pantoprazole 40mg daily and propranolol bid. Patient to follow up with GI. Of note, patient with right wrist fracture in 01/2020. Wrist xray done as pt complaining of worsening pain and swelling. Xray consistent with old fracture.         New medications: pantoprazole 40mg daily, propranolol 20mg bid    Labs to be followed outpatient: cbc, cmp    Exam to be followed outpatient: basic exam

## 2020-03-16 NOTE — PROGRESS NOTE ADULT - SUBJECTIVE AND OBJECTIVE BOX
Patient is a 60y old  Female who presents with a chief complaint of     INTERVAL HPI/OVERNIGHT EVENTS:    Pt. seen and examined this morning  Pt. feels well, no further melena or hematemesis  Denies CP, SOB, fatigue, lightheadedness  Pt. expressed concerns re: ability to complete ADLs given wrist fx    Review of Systems: 12 point review of systems otherwise negative    MEDICATIONS  (STANDING):  cefTRIAXone   IVPB 1000 milliGRAM(s) IV Intermittent every 24 hours  folic acid 1 milliGRAM(s) Oral daily  influenza   Vaccine 0.5 milliLiter(s) IntraMuscular once  magnesium sulfate  IVPB 4 Gram(s) IV Intermittent once  multivitamin 1 Tablet(s) Oral daily  octreotide  Infusion 50 MICROgram(s)/Hr (10 mL/Hr) IV Continuous <Continuous>  pantoprazole  Injectable 40 milliGRAM(s) IV Push every 12 hours  thiamine 100 milliGRAM(s) Oral daily    MEDICATIONS  (PRN):  acetaminophen   Tablet .. 650 milliGRAM(s) Oral every 6 hours PRN Mild Pain (1 - 3)  ondansetron    Tablet 4 milliGRAM(s) Oral every 8 hours PRN Nausea and/or Vomiting      Allergies    Cipro (Hives; Urticaria)    Intolerances          Vital Signs Last 24 Hrs  T(C): 36.8 (16 Mar 2020 09:02), Max: 37.4 (15 Mar 2020 20:21)  T(F): 98.2 (16 Mar 2020 09:02), Max: 99.3 (15 Mar 2020 20:21)  HR: 61 (16 Mar 2020 09:02) (61 - 78)  BP: 118/74 (16 Mar 2020 09:02) (110/70 - 124/80)  BP(mean): --  RR: 18 (16 Mar 2020 09:02) (14 - 18)  SpO2: 100% (16 Mar 2020 09:02) (98% - 100%)  CAPILLARY BLOOD GLUCOSE          03-15 @ 07:01  -  03-16 @ 07:00  --------------------------------------------------------  IN: 200 mL / OUT: 0 mL / NET: 200 mL        Physical Exam:  (this morning)  Daily     Daily   General:  well-appearing in NAD  HEENT:  MMM no pallor, anicteric  Extremities: R splint off  Skin:  WWP  Neuro:  AAOx3    LABS:                        8.3    2.76  )-----------( 83       ( 16 Mar 2020 08:18 )             25.9     03-16    139  |  105  |  9   ----------------------------<  99  3.4<L>   |  24  |  0.57    Ca    8.3<L>      16 Mar 2020 08:18  Phos  4.2     03-15  Mg     1.5     03-16    TPro  5.4<L>  /  Alb  3.2<L>  /  TBili  1.7<H>  /  DBili  x   /  AST  44<H>  /  ALT  18  /  AlkPhos  122<H>  03-15    PT/INR - ( 16 Mar 2020 08:18 )   PT: 13.0 sec;   INR: 1.14          PTT - ( 16 Mar 2020 08:18 )  PTT:31.0 sec        RADIOLOGY & ADDITIONAL TESTS:    ---------------------------------------------------------------------------  I personally reviewed: [  ]EKG   [  ]CXR    [  ] CT    [  ]Other  ---------------------------------------------------------------------------  PLEASE CHECK WHEN PRESENT:     [  ]Heart Failure     [  ] Acute     [  ] Acute on Chronic     [  ] Chronic  -------------------------------------------------------------------     [  ]Diastolic [HFpEF]     [  ]Systolic [HFrEF]     [  ]Combined [HFpEF & HFrEF]     [  ]Other:  -------------------------------------------------------------------  [  ]ROXANE     [  ]ATN     [  ]Reneal Medullary Necrosis     [  ]Renal Cortical Necrosis     [  ]Other Pathological Lesions:    [  ]CKD 1  [  ]CKD 2  [  ]CKD 3  [  ]CKD 4  [  ]CKD 5  [  ]Other  -------------------------------------------------------------------  [  ]Other/Unspecified:    --------------------------------------------------------------------    Abdominal Nutritional Status  [  ]Malnutrition: See Nutrition Note  [  ]Cachexia  [  ]Other:   [  ]Supplement Ordered:  [  ]Morbid Obesity (BMI >=40]

## 2020-03-16 NOTE — PROGRESS NOTE ADULT - SUBJECTIVE AND OBJECTIVE BOX
OVERNIGHT EVENTS: No acute events overnight.    SUBJECTIVE / INTERVAL HPI: Patient seen and examined at bedside. Denies any further episodes of hematemesis or melena/hematochezia.     VITAL SIGNS:  Vital Signs Last 24 Hrs  T(C): 36.6 (16 Mar 2020 15:30), Max: 37.4 (15 Mar 2020 20:21)  T(F): 97.8 (16 Mar 2020 15:30), Max: 99.3 (15 Mar 2020 20:21)  HR: 72 (16 Mar 2020 15:30) (61 - 78)  BP: 123/72 (16 Mar 2020 15:30) (110/70 - 124/80)  BP(mean): --  RR: 18 (16 Mar 2020 15:30) (14 - 18)  SpO2: 98% (16 Mar 2020 15:30) (98% - 100%)    PHYSICAL EXAM:    General: WDWN  HEENT: NC/AT; PERRL, anicteric sclera; MMM  Neck: supple  Cardiovascular: +S1/S2; RRR  Respiratory: CTA B/L; no W/R/R  Gastrointestinal: soft, NT/ND; +BSx4  Extremities: WWP; no edema, clubbing or cyanosis  Vascular: 2+ radial, DP/PT pulses B/L  Neurological: AAOx3; no focal deficits    MEDICATIONS:  MEDICATIONS  (STANDING):  folic acid 1 milliGRAM(s) Oral daily  influenza   Vaccine 0.5 milliLiter(s) IntraMuscular once  multivitamin 1 Tablet(s) Oral daily  pantoprazole    Tablet 40 milliGRAM(s) Oral before breakfast  propranolol 20 milliGRAM(s) Oral every 12 hours  thiamine 100 milliGRAM(s) Oral daily    MEDICATIONS  (PRN):  acetaminophen   Tablet .. 650 milliGRAM(s) Oral every 6 hours PRN Mild Pain (1 - 3)  ondansetron    Tablet 4 milliGRAM(s) Oral every 8 hours PRN Nausea and/or Vomiting      ALLERGIES:  Allergies    Cipro (Hives; Urticaria)    Intolerances        LABS:                        8.3    2.76  )-----------( 83       ( 16 Mar 2020 08:18 )             25.9     03-16    139  |  105  |  9   ----------------------------<  99  3.4<L>   |  24  |  0.57    Ca    8.3<L>      16 Mar 2020 08:18  Phos  4.2     03-15  Mg     1.5     03-16    TPro  5.4<L>  /  Alb  3.2<L>  /  TBili  1.7<H>  /  DBili  x   /  AST  44<H>  /  ALT  18  /  AlkPhos  122<H>  03-15    PT/INR - ( 16 Mar 2020 08:18 )   PT: 13.0 sec;   INR: 1.14          PTT - ( 16 Mar 2020 08:18 )  PTT:31.0 sec    CAPILLARY BLOOD GLUCOSE          RADIOLOGY & ADDITIONAL TESTS: Reviewed. OVERNIGHT EVENTS: No acute events overnight.    SUBJECTIVE / INTERVAL HPI: Patient seen and examined at bedside. Denies any further episodes of hematemesis or melena/hematochezia. States that she is still experiencing significant pain at the site of her wrist fracture, though reports it is less tender, swollen and erythematous than yesterday. Denies fevers/chills, chest pain, sob, abdominal pain, nausea/vomiting.    VITAL SIGNS:  Vital Signs Last 24 Hrs  T(C): 36.6 (16 Mar 2020 15:30), Max: 37.4 (15 Mar 2020 20:21)  T(F): 97.8 (16 Mar 2020 15:30), Max: 99.3 (15 Mar 2020 20:21)  HR: 72 (16 Mar 2020 15:30) (61 - 78)  BP: 123/72 (16 Mar 2020 15:30) (110/70 - 124/80)  BP(mean): --  RR: 18 (16 Mar 2020 15:30) (14 - 18)  SpO2: 98% (16 Mar 2020 15:30) (98% - 100%)    PHYSICAL EXAM:      General: Laying comfortably in bed, NAD  HEENT: NC/AT; PERRL, anicteric sclera; MMM  Neck: supple  Cardiovascular: +S1/S2; RRR  Respiratory: CTA B/L; no W/R/R  Gastrointestinal: soft, NT/ND; +BSx4  Extremities: WWP; no edema, clubbing or cyanosis  Vascular: 2+ radial, DP/PT pulses B/L  Neurological: AAOx3; no focal deficits, no asterixis    MEDICATIONS:  MEDICATIONS  (STANDING):  folic acid 1 milliGRAM(s) Oral daily  influenza   Vaccine 0.5 milliLiter(s) IntraMuscular once  multivitamin 1 Tablet(s) Oral daily  pantoprazole    Tablet 40 milliGRAM(s) Oral before breakfast  propranolol 20 milliGRAM(s) Oral every 12 hours  thiamine 100 milliGRAM(s) Oral daily    MEDICATIONS  (PRN):  acetaminophen   Tablet .. 650 milliGRAM(s) Oral every 6 hours PRN Mild Pain (1 - 3)  ondansetron    Tablet 4 milliGRAM(s) Oral every 8 hours PRN Nausea and/or Vomiting      ALLERGIES:  Allergies    Cipro (Hives; Urticaria)    Intolerances        LABS:                        8.3    2.76  )-----------( 83       ( 16 Mar 2020 08:18 )             25.9     03-16    139  |  105  |  9   ----------------------------<  99  3.4<L>   |  24  |  0.57    Ca    8.3<L>      16 Mar 2020 08:18  Phos  4.2     03-15  Mg     1.5     03-16    TPro  5.4<L>  /  Alb  3.2<L>  /  TBili  1.7<H>  /  DBili  x   /  AST  44<H>  /  ALT  18  /  AlkPhos  122<H>  03-15    PT/INR - ( 16 Mar 2020 08:18 )   PT: 13.0 sec;   INR: 1.14          PTT - ( 16 Mar 2020 08:18 )  PTT:31.0 sec    CAPILLARY BLOOD GLUCOSE          RADIOLOGY & ADDITIONAL TESTS: Reviewed.

## 2020-03-16 NOTE — DISCHARGE NOTE PROVIDER - CARE PROVIDER_API CALL
Luiz Borrero)  Gastroenterology; Internal Medicine  [address] [place] [address]  Phone: [phone]  Fax: [phone]  Scheduled Appointment: 03/30/2020 02:30 PM

## 2020-03-16 NOTE — DISCHARGE NOTE PROVIDER - NSDCCPCAREPLAN_GEN_ALL_CORE_FT
PRINCIPAL DISCHARGE DIAGNOSIS  Diagnosis: Hematemesis  Assessment and Plan of Treatment: You came to the hospital after you had some blood while vomiting and in your stool. You required two units of red blood cells, to which you responded appropriately. The gastrointestinal team performed an endoscopy which showed portal hypertensive gastropathy, which is caused by your liver cirrhosis and makes it easier for you to have a bleed. Please take the propranolol and pantoprazole prescribed to you and follow up with the gastrointestinal team. if you should see blood in your vomit or stool again, please let your primary care doctor know or return to the emergency room.      SECONDARY DISCHARGE DIAGNOSES  Diagnosis: Closed fracture of distal end of right radius, unspecified fracture morphology, initial encounter  Assessment and Plan of Treatment: You recently had a wrist fracture with worsened swelling and redness. We got an xray of the wrist which did not show any changes in your wrist and was consistent with an old fracture. Please follow up with your orthopedic surgeon for regular follow up.

## 2020-03-17 VITALS
DIASTOLIC BLOOD PRESSURE: 63 MMHG | RESPIRATION RATE: 18 BRPM | SYSTOLIC BLOOD PRESSURE: 105 MMHG | OXYGEN SATURATION: 100 % | TEMPERATURE: 98 F | HEART RATE: 62 BPM

## 2020-03-17 LAB
ANION GAP SERPL CALC-SCNC: 10 MMOL/L — SIGNIFICANT CHANGE UP (ref 5–17)
BUN SERPL-MCNC: 7 MG/DL — SIGNIFICANT CHANGE UP (ref 7–23)
CALCIUM SERPL-MCNC: 8.8 MG/DL — SIGNIFICANT CHANGE UP (ref 8.4–10.5)
CHLORIDE SERPL-SCNC: 103 MMOL/L — SIGNIFICANT CHANGE UP (ref 96–108)
CO2 SERPL-SCNC: 25 MMOL/L — SIGNIFICANT CHANGE UP (ref 22–31)
CREAT SERPL-MCNC: 0.54 MG/DL — SIGNIFICANT CHANGE UP (ref 0.5–1.3)
GLUCOSE SERPL-MCNC: 114 MG/DL — HIGH (ref 70–99)
HCT VFR BLD CALC: 28.5 % — LOW (ref 34.5–45)
HGB BLD-MCNC: 9 G/DL — LOW (ref 11.5–15.5)
MAGNESIUM SERPL-MCNC: 1.9 MG/DL — SIGNIFICANT CHANGE UP (ref 1.6–2.6)
MCHC RBC-ENTMCNC: 31.5 PG — SIGNIFICANT CHANGE UP (ref 27–34)
MCHC RBC-ENTMCNC: 31.6 GM/DL — LOW (ref 32–36)
MCV RBC AUTO: 99.7 FL — SIGNIFICANT CHANGE UP (ref 80–100)
NRBC # BLD: 0 /100 WBCS — SIGNIFICANT CHANGE UP (ref 0–0)
PLATELET # BLD AUTO: 122 K/UL — LOW (ref 150–400)
POTASSIUM SERPL-MCNC: 4.5 MMOL/L — SIGNIFICANT CHANGE UP (ref 3.5–5.3)
POTASSIUM SERPL-SCNC: 4.5 MMOL/L — SIGNIFICANT CHANGE UP (ref 3.5–5.3)
RBC # BLD: 2.86 M/UL — LOW (ref 3.8–5.2)
RBC # FLD: 16 % — HIGH (ref 10.3–14.5)
SODIUM SERPL-SCNC: 138 MMOL/L — SIGNIFICANT CHANGE UP (ref 135–145)
WBC # BLD: 4.08 K/UL — SIGNIFICANT CHANGE UP (ref 3.8–10.5)
WBC # FLD AUTO: 4.08 K/UL — SIGNIFICANT CHANGE UP (ref 3.8–10.5)

## 2020-03-17 PROCEDURE — 84484 ASSAY OF TROPONIN QUANT: CPT

## 2020-03-17 PROCEDURE — 83735 ASSAY OF MAGNESIUM: CPT

## 2020-03-17 PROCEDURE — C9113: CPT

## 2020-03-17 PROCEDURE — 85027 COMPLETE CBC AUTOMATED: CPT

## 2020-03-17 PROCEDURE — 84100 ASSAY OF PHOSPHORUS: CPT

## 2020-03-17 PROCEDURE — 86923 COMPATIBILITY TEST ELECTRIC: CPT

## 2020-03-17 PROCEDURE — 97161 PT EVAL LOW COMPLEX 20 MIN: CPT

## 2020-03-17 PROCEDURE — 86901 BLOOD TYPING SEROLOGIC RH(D): CPT

## 2020-03-17 PROCEDURE — 99285 EMERGENCY DEPT VISIT HI MDM: CPT | Mod: 25

## 2020-03-17 PROCEDURE — 99238 HOSP IP/OBS DSCHRG MGMT 30/<: CPT | Mod: GC

## 2020-03-17 PROCEDURE — 73100 X-RAY EXAM OF WRIST: CPT

## 2020-03-17 PROCEDURE — 86850 RBC ANTIBODY SCREEN: CPT

## 2020-03-17 PROCEDURE — 85610 PROTHROMBIN TIME: CPT

## 2020-03-17 PROCEDURE — 83690 ASSAY OF LIPASE: CPT

## 2020-03-17 PROCEDURE — 97530 THERAPEUTIC ACTIVITIES: CPT

## 2020-03-17 PROCEDURE — 36430 TRANSFUSION BLD/BLD COMPNT: CPT

## 2020-03-17 PROCEDURE — 85025 COMPLETE CBC W/AUTO DIFF WBC: CPT

## 2020-03-17 PROCEDURE — 96374 THER/PROPH/DIAG INJ IV PUSH: CPT

## 2020-03-17 PROCEDURE — 93005 ELECTROCARDIOGRAM TRACING: CPT

## 2020-03-17 PROCEDURE — 80307 DRUG TEST PRSMV CHEM ANLYZR: CPT

## 2020-03-17 PROCEDURE — 96361 HYDRATE IV INFUSION ADD-ON: CPT

## 2020-03-17 PROCEDURE — 36415 COLL VENOUS BLD VENIPUNCTURE: CPT

## 2020-03-17 PROCEDURE — 83605 ASSAY OF LACTIC ACID: CPT

## 2020-03-17 PROCEDURE — 82550 ASSAY OF CK (CPK): CPT

## 2020-03-17 PROCEDURE — 85730 THROMBOPLASTIN TIME PARTIAL: CPT

## 2020-03-17 PROCEDURE — P9016: CPT

## 2020-03-17 PROCEDURE — 97535 SELF CARE MNGMENT TRAINING: CPT

## 2020-03-17 PROCEDURE — 96375 TX/PRO/DX INJ NEW DRUG ADDON: CPT

## 2020-03-17 PROCEDURE — 71045 X-RAY EXAM CHEST 1 VIEW: CPT

## 2020-03-17 PROCEDURE — 80048 BASIC METABOLIC PNL TOTAL CA: CPT

## 2020-03-17 PROCEDURE — 97110 THERAPEUTIC EXERCISES: CPT

## 2020-03-17 PROCEDURE — 97116 GAIT TRAINING THERAPY: CPT

## 2020-03-17 PROCEDURE — 80053 COMPREHEN METABOLIC PANEL: CPT

## 2020-03-17 RX ORDER — PROPRANOLOL HCL 160 MG
1 CAPSULE, EXTENDED RELEASE 24HR ORAL
Qty: 60 | Refills: 2
Start: 2020-03-17 | End: 2020-06-14

## 2020-03-17 RX ORDER — TRAMADOL HYDROCHLORIDE 50 MG/1
25 TABLET ORAL ONCE
Refills: 0 | Status: DISCONTINUED | OUTPATIENT
Start: 2020-03-17 | End: 2020-03-17

## 2020-03-17 RX ORDER — PANTOPRAZOLE SODIUM 20 MG/1
1 TABLET, DELAYED RELEASE ORAL
Qty: 30 | Refills: 2
Start: 2020-03-17 | End: 2020-06-14

## 2020-03-17 RX ORDER — MAGNESIUM SULFATE 500 MG/ML
1 VIAL (ML) INJECTION ONCE
Refills: 0 | Status: COMPLETED | OUTPATIENT
Start: 2020-03-17 | End: 2020-03-17

## 2020-03-17 RX ADMIN — Medication 100 MILLIGRAM(S): at 11:12

## 2020-03-17 RX ADMIN — PANTOPRAZOLE SODIUM 40 MILLIGRAM(S): 20 TABLET, DELAYED RELEASE ORAL at 06:17

## 2020-03-17 RX ADMIN — Medication 1 TABLET(S): at 11:12

## 2020-03-17 RX ADMIN — Medication 1 MILLIGRAM(S): at 11:12

## 2020-03-17 RX ADMIN — Medication 100 GRAM(S): at 08:44

## 2020-03-17 RX ADMIN — TRAMADOL HYDROCHLORIDE 25 MILLIGRAM(S): 50 TABLET ORAL at 14:53

## 2020-03-17 NOTE — PROGRESS NOTE ADULT - PROBLEM SELECTOR PLAN 5
replete
repleted
Pt with low platelets likey 2/2 EtOH abuse.
Pt with platelets 130 likey 2/2 EtOH abuse.

## 2020-03-17 NOTE — PROGRESS NOTE ADULT - SUBJECTIVE AND OBJECTIVE BOX
Patient is a 60y old  Female who presents with a chief complaint of     INTERVAL HPI/OVERNIGHT EVENTS:    Pt. seen and examined this morning  Pt. feels well, interested in rehab  No melena and/or hematemesis     Review of Systems: 12 point review of systems otherwise negative    MEDICATIONS  (STANDING):  folic acid 1 milliGRAM(s) Oral daily  influenza   Vaccine 0.5 milliLiter(s) IntraMuscular once  multivitamin 1 Tablet(s) Oral daily  pantoprazole    Tablet 40 milliGRAM(s) Oral before breakfast  propranolol 20 milliGRAM(s) Oral every 12 hours  thiamine 100 milliGRAM(s) Oral daily    MEDICATIONS  (PRN):  acetaminophen   Tablet .. 650 milliGRAM(s) Oral every 6 hours PRN Mild Pain (1 - 3)  ondansetron    Tablet 4 milliGRAM(s) Oral every 8 hours PRN Nausea and/or Vomiting      Allergies    Cipro (Hives; Urticaria)    Intolerances          Vital Signs Last 24 Hrs  T(C): 36.7 (17 Mar 2020 08:21), Max: 36.8 (16 Mar 2020 20:41)  T(F): 98 (17 Mar 2020 08:21), Max: 98.3 (17 Mar 2020 05:09)  HR: 62 (17 Mar 2020 08:21) (62 - 72)  BP: 105/63 (17 Mar 2020 08:21) (105/63 - 123/72)  BP(mean): --  RR: 18 (17 Mar 2020 08:21) (16 - 18)  SpO2: 100% (17 Mar 2020 08:21) (97% - 100%)  CAPILLARY BLOOD GLUCOSE            Physical Exam:  (this morning)  Daily     Daily   General:  well-appearing in NAD  HEENT:  MMM no pallor, anicteric  Extremities: +R splint  Skin:  WWP  Neuro:  AAOx3    LABS:                        9.0    4.08  )-----------( 122      ( 17 Mar 2020 07:28 )             28.5     03-17    138  |  103  |  7   ----------------------------<  114<H>  4.5   |  25  |  0.54    Ca    8.8      17 Mar 2020 07:28  Mg     1.9     03-17      PT/INR - ( 16 Mar 2020 08:18 )   PT: 13.0 sec;   INR: 1.14          PTT - ( 16 Mar 2020 08:18 )  PTT:31.0 sec        RADIOLOGY & ADDITIONAL TESTS:    ---------------------------------------------------------------------------  I personally reviewed: [  ]EKG   [  ]CXR    [  ] CT    [  ]Other  ---------------------------------------------------------------------------  PLEASE CHECK WHEN PRESENT:     [  ]Heart Failure     [  ] Acute     [  ] Acute on Chronic     [  ] Chronic  -------------------------------------------------------------------     [  ]Diastolic [HFpEF]     [  ]Systolic [HFrEF]     [  ]Combined [HFpEF & HFrEF]     [  ]Other:  -------------------------------------------------------------------  [  ]ROXANE     [  ]ATN     [  ]Reneal Medullary Necrosis     [  ]Renal Cortical Necrosis     [  ]Other Pathological Lesions:    [  ]CKD 1  [  ]CKD 2  [  ]CKD 3  [  ]CKD 4  [  ]CKD 5  [  ]Other  -------------------------------------------------------------------  [  ]Other/Unspecified:    --------------------------------------------------------------------    Abdominal Nutritional Status  [  ]Malnutrition: See Nutrition Note  [  ]Cachexia  [  ]Other:   [  ]Supplement Ordered:  [  ]Morbid Obesity (BMI >=40]

## 2020-03-17 NOTE — PROGRESS NOTE ADULT - PROBLEM SELECTOR PLAN 3
healing, s/p fixation per imaging; Pt. has outpatient Ortho f/u; cont. PT/OT
healing, s/p fixation per imaging; Pt. has outpatient Ortho f/u; cont. PT/OT
Pt with known cirrhosis. AST 45, ALT: 20. ALk Phos: 135.   - follows with GI outpatient, next appt 3/30
Pt with known cirrhosis. AST 45, ALT: 20. ALk Phos: 135.   - c/w ceftriaxone for SBP ppx.

## 2020-03-17 NOTE — PROGRESS NOTE ADULT - PROBLEM SELECTOR PROBLEM 1
Acute blood loss anemia
Acute blood loss anemia
GI (gastrointestinal bleed)
GI (gastrointestinal bleed)

## 2020-03-17 NOTE — PROGRESS NOTE ADULT - PROBLEM SELECTOR PLAN 2
GI following; no e/o ascites, HE, or esophageal varices; cont. low-salt diet; monitor LFTs and coags; will need outpatient hepatology f/u
GI following; no e/o ascites, HE, or esophageal varices; cont. low-salt diet; monitor LFTs and coags; will need outpatient hepatology f/u
Hgb 6.8 at [facility], repeat Hgb at [facility] 6. MCV 98.4  Anemia likely 2/2 GI bleed at pt presenting with hematemesis/melena.  - s/p 2 units PRBCs with appropriate response  - maintain active t&S  - transfuse for Hgb < 7
Hgb 6.8 at [facility], repeat Hgb at [facility] 6. MCV 98.4  Anemia likely 2/2 GI bleed at pt presenting with hematemesis/melena.  - s/p 2 units PRBCs with appropriate response  - maintain active t&S  - transfuse for Hgb < 7

## 2020-03-17 NOTE — PROGRESS NOTE ADULT - ASSESSMENT
59 y/o F w/
59yo F with PMHx of EtOH abuse and cirrhosis (-PSE, -EV, -ascites), recent admission 2/25-2/27/20 for hematemesis sp EGD now presents with one day of hematemesis and melena.     1. Acute blood loss anemia 2/2 Portal hypertensive gastropathy - Patient s/p EGD on 3/16 without esophageal varices, only portal hypertensive gastropathy without ulceration.   - Continue PPI once daily x8 weeks  - Continue Propanolol titrated to MAP >65, target resting HR of 60    2. Compensated Cirrhosis 2/2 EtOH - MELD Na 11 on presentation  - Ascites: Not present  - EV: Not present  - PSE: No prior history of PSE, no e/o asterixis on exam  - Encourage alcohol cessation  - MVT/Thiamine/Folate  - Monitor on CIWA while hospitalized    Recommendations discussed with primary team  Case discussed with attending physician  Please recall as needed with any gastroenterological questions  Patient has an appointment scheduled with Dr. Borrero on 3/30, please verify at [phone]
60F with PMH EtOH abuse, cirrhosis (-PSE, -EV, -ascites), recent admission 2/25-2/27/20 for hematemesis sp EGD now presents with 1d of hematemesis and melena.     #Acute blood loss anemia  Suspect from UGIB 2/2 portal hypertensive gastropathy seen in recent endoscopy.  Given nausea and vomiting, differential includes levon-painter and PUD.  Low suspicion of variceal bleeding in the setting of recent endoscopy.  HD stable and hgb responding to transfusion, lowers suspicion of active GI bleeding.  - Trend CBC, transfuse to goal >7g/dL  - Maintain active T&S and 2 large bore IV  - C/w PPI IV BID  - Continue octreotide gtt  - Continue ceftriaxone for SBP PPx given history of cirrhosis with acute GI bleeding  - Will continue to monitor BM  - Antiemetic prn  - NPO at [facility] for EGD tomorrow  - Please notify GI fellow if patient develops overt GI bleeding or change in hemodynamic     # Compensated Cirrhosis 2/2 EtOH - MELD Na 11 on presentation  - Ascites: No prior history of ascites  - EV: Last EGD 2/2020 without e/o EV  - PSE: No prior history of PSE, no e/o asterixis on exam  - Daily INR and Coag  - Encourage alcohol cessation  - MVI/Thiamine/Folate  - Monitor on CIWA    Patient seen and discussed with Dr. Gibbs
60F with PMH EtOH abuse, cirrhosis (-PSE, -EV, -ascites), recent admission 2/25-2/27/20 for hematemesis, who now presents with UGIB    #Anemia secondary to GI blood loss- pt reports history of hematemesis and melena. Hemoglobin 6 on admission. VSS. Since admission, pt has remained stable and there are no active signs of GIB at this time.   Source of bleed is likely portal hypertensive gastropathy, as was seen on EGD in feb. Less likely varices as she did not have varices on recent egd  Follow up post transfusion cbc, transfuse if hemoglobin <7.  -C/w IV PPI BID, octreotide, ceftriaxone ( given history of cirrhosis)  -GI following- will monitor for now and will scope if signs of active bleeding    #Compensated cirrhosis 2/2 ETOH use ( - EV, -Ascites,- PSE, - HCC), MELD NA 9  Ascites- none on exam  EV: no varices on EGD in Feb  PSE: no signs, A0X3  HCC: no lesions on abd US in Dev 2019  -GI following
61 y/o F w/
60F with PMH EtOH abuse, cirrhosis, anemia, prior GI bleeds, R wrist break, who presents with hematemesis/melena concerning for GI bleed, with EGD confirming portal hypertensive gastropathy.
60F with PMH EtOH abuse, cirrhosis, anemia, prior GI bleeds, R wrist break, who presents with hematemesis/melena concerning for GI bleed.

## 2020-03-17 NOTE — PROGRESS NOTE ADULT - PROBLEM SELECTOR PLAN 6
replete
repleted
F: none  E: replete electrolytes K< 4, Mg <2  N: Regular  DVT PPx:  hold in setting of active GI bleed  Code status: Full Code
F: none  E: replete electrolytes K< 4, Mg <2  N: NPO for GI procedure  DVT PPx:  hold in setting of active GI bleed  Code status: Full Code

## 2020-03-17 NOTE — PROGRESS NOTE ADULT - PROBLEM SELECTOR PROBLEM 3
Closed fracture of distal end of right radius, unspecified fracture morphology, initial encounter
Closed fracture of distal end of right radius, unspecified fracture morphology, initial encounter
Cirrhosis
Cirrhosis

## 2020-03-17 NOTE — PROGRESS NOTE ADULT - PROBLEM SELECTOR PLAN 4
cont. thiamine, folate, MVI; SBIRT SW consult; monitor for signs/sx of withdrawal (none seen)
cont. thiamine, folate, MVI; SBIRT SW consult; monitor for signs/sx of withdrawal (none seen)
Pt states her last drink was 1 week ago.   - SW for help with EtOH
Pt states her last drink was 1 week ago.   - SW for help with EtOH

## 2020-03-17 NOTE — PROGRESS NOTE ADULT - SUBJECTIVE AND OBJECTIVE BOX
GASTROENTEROLOGY PROGRESS NOTE  Patient seen and examined at bedside. Patient without any melena or hematochezia. Feels well this morning without fevers, chills, abdominal pain. Counseled again on the importance of alcohol abstinence.    PERTINENT REVIEW OF SYSTEMS:  CONSTITUTIONAL: No weakness, fevers or chills  HEENT: No visual changes; No vertigo or throat pain   GASTROINTESTINAL: No abdominal or epigastric pain. No nausea, vomiting, or hematemesis; No diarrhea or constipation. No melena or hematochezia.  NEUROLOGICAL: No numbness or weakness  SKIN: No itching, burning, rashes, or lesions     Allergies    Cipro (Hives; Urticaria)    Intolerances      MEDICATIONS:  MEDICATIONS  (STANDING):  folic acid 1 milliGRAM(s) Oral daily  influenza   Vaccine 0.5 milliLiter(s) IntraMuscular once  magnesium sulfate  IVPB 1 Gram(s) IV Intermittent once  multivitamin 1 Tablet(s) Oral daily  pantoprazole    Tablet 40 milliGRAM(s) Oral before breakfast  propranolol 20 milliGRAM(s) Oral every 12 hours  thiamine 100 milliGRAM(s) Oral daily    MEDICATIONS  (PRN):  acetaminophen   Tablet .. 650 milliGRAM(s) Oral every 6 hours PRN Mild Pain (1 - 3)  ondansetron    Tablet 4 milliGRAM(s) Oral every 8 hours PRN Nausea and/or Vomiting    Vital Signs Last 24 Hrs  T(C): 36.8 (17 Mar 2020 05:09), Max: 36.8 (16 Mar 2020 09:02)  T(F): 98.3 (17 Mar 2020 05:09), Max: 98.3 (17 Mar 2020 05:09)  HR: 72 (17 Mar 2020 05:09) (61 - 72)  BP: 113/70 (17 Mar 2020 05:09) (109/64 - 123/72)  BP(mean): --  RR: 16 (17 Mar 2020 05:09) (16 - 18)  SpO2: 97% (17 Mar 2020 05:09) (97% - 100%)    PHYSICAL EXAM:    General: Well developed; well nourished; in no acute distress  HEENT: MMM, conjunctiva and sclera clear  Skin: No obvious rash    LABS:                        9.0    4.08  )-----------( 122      ( 17 Mar 2020 07:28 )             28.5     03-17    138  |  103  |  7   ----------------------------<  114<H>  4.5   |  25  |  0.54    Ca    8.8      17 Mar 2020 07:28  Mg     1.9     03-17      PT/INR - ( 16 Mar 2020 08:18 )   PT: 13.0 sec;   INR: 1.14          PTT - ( 16 Mar 2020 08:18 )  PTT:31.0 sec                  RADIOLOGY & ADDITIONAL STUDIES:  Reviewed

## 2020-03-17 NOTE — PROGRESS NOTE ADULT - PROBLEM SELECTOR PROBLEM 2
Alcoholic cirrhosis of liver without ascites
Alcoholic cirrhosis of liver without ascites
Anemia
Anemia

## 2020-03-17 NOTE — PROGRESS NOTE ADULT - PROBLEM SELECTOR PLAN 1
likely d/t portal hypertensive gastropathy; GI following; cont. octreotide, CTX, and IV PPI BID for now, pending EGD; Hb improved s/p PRBC transfusion; monitor CBC, goal Hb > 7
likely d/t portal hypertensive gastropathy; GI following; s/p EGD; cont. PPI and beta-blocker; monitor CBC
Pt with prior hx GI bleed presenting with hematemesis/melena. Melena present on JACLYN. ICU consulted, deemed stable for RMF. Pt has not had episode of melena since admission. GI consulted at [facility], recommended octreotide, ceftriaxone.  - s/p EGD showing portal hypertensive gastropathy  - will start pantoprazole 40mg daily and propranolol 20mg bid  - s/p 2 unit PRBCs with appropriate Hg response. Hg stable this am  - maintain active t&S  - transfuse for Hgb < 7  - will f/u with GI outpatient
Pt with prior hx GI bleed presenting with hematemesis/melena. Melena present on JACLYN. ICU consulted, deemed stable for RMF. Pt has not had episode of melena since admission. GI consulted at [facility], recommended octreotide, ceftriaxone.  - c/w ceftriaxone for SBP ppx  - PPI BID  - c/w octreotide   - s/p 2 unit PRBCs with appropriate Hg response. Hg stable at 7.9 this am  - maintain active t&S  - transfuse for Hgb < 7  - GI recs appreciated, anticipate scope tomorrow

## 2020-03-23 DIAGNOSIS — F10.10 ALCOHOL ABUSE, UNCOMPLICATED: ICD-10-CM
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
## 2020-03-23 DIAGNOSIS — E87.6 HYPOKALEMIA: ICD-10-CM

## 2020-03-23 DIAGNOSIS — D62 ACUTE POSTHEMORRHAGIC ANEMIA: ICD-10-CM
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
## 2020-03-23 DIAGNOSIS — K57.90 DIVERTICULOSIS OF INTESTINE, PART UNSPECIFIED, WITHOUT PERFORATION OR ABSCESS WITHOUT BLEEDING: ICD-10-CM

## 2020-03-23 DIAGNOSIS — S52.91XA UNSPECIFIED FRACTURE OF RIGHT FOREARM, INITIAL ENCOUNTER FOR CLOSED FRACTURE: ICD-10-CM

## 2020-03-23 DIAGNOSIS — E83.42 HYPOMAGNESEMIA: ICD-10-CM

## 2020-03-23 DIAGNOSIS — D61.818 OTHER PANCYTOPENIA: ICD-10-CM

## 2020-03-23 DIAGNOSIS — K92.0 HEMATEMESIS: ICD-10-CM

## 2020-03-23 DIAGNOSIS — K76.6 PORTAL HYPERTENSION: ICD-10-CM

## 2020-03-23 DIAGNOSIS — K44.9 DIAPHRAGMATIC HERNIA WITHOUT OBSTRUCTION OR GANGRENE: ICD-10-CM

## 2020-03-23 DIAGNOSIS — K70.30 ALCOHOLIC CIRRHOSIS OF LIVER WITHOUT ASCITES: ICD-10-CM

## 2020-03-23 DIAGNOSIS — K29.80 DUODENITIS WITHOUT BLEEDING: ICD-10-CM

## 2020-03-23 DIAGNOSIS — K29.70 GASTRITIS, UNSPECIFIED, WITHOUT BLEEDING: ICD-10-CM

## 2020-03-23 DIAGNOSIS — K31.89 OTHER DISEASES OF STOMACH AND DUODENUM: ICD-10-CM

## 2020-04-22 ENCOUNTER — APPOINTMENT (OUTPATIENT)
Dept: GASTROENTEROLOGY | Facility: CLINIC | Age: 60
End: 2020-04-22
Payer: MEDICAID

## 2020-04-22 DIAGNOSIS — K29.70 GASTRITIS, UNSPECIFIED, W/OUT BLEEDING: ICD-10-CM

## 2020-04-22 PROCEDURE — 99214 OFFICE O/P EST MOD 30 MIN: CPT

## 2020-05-01 PROCEDURE — G9005: CPT

## 2020-05-04 NOTE — ED PROVIDER NOTE - PMH
Spoke with mom after speaking to Dr George Pinto and we will set up a video visit for this upcoming Friday 5/8/2020 @ 10:00am, mom will downloaded [employer] and [facility] selina.  Knows she is to log in and E check into the appointment 10min prior to appointment time Gastritis    Lyme disease    Pyelonephritis

## 2020-07-13 ENCOUNTER — TRANSCRIPTION ENCOUNTER (OUTPATIENT)
Age: 60
End: 2020-07-13

## 2020-07-20 ENCOUNTER — APPOINTMENT (OUTPATIENT)
Dept: GASTROENTEROLOGY | Facility: CLINIC | Age: 60
End: 2020-07-20
Payer: MEDICAID

## 2020-07-20 VITALS
HEART RATE: 174 BPM | HEIGHT: 64 IN | WEIGHT: 118 LBS | OXYGEN SATURATION: 98 % | TEMPERATURE: 98.5 F | SYSTOLIC BLOOD PRESSURE: 90 MMHG | DIASTOLIC BLOOD PRESSURE: 68 MMHG | BODY MASS INDEX: 20.14 KG/M2

## 2020-07-20 PROCEDURE — 99215 OFFICE O/P EST HI 40 MIN: CPT

## 2020-07-21 VITALS
WEIGHT: 117.95 LBS | SYSTOLIC BLOOD PRESSURE: 97 MMHG | HEART RATE: 120 BPM | TEMPERATURE: 103 F | HEIGHT: 64 IN | DIASTOLIC BLOOD PRESSURE: 60 MMHG | RESPIRATION RATE: 18 BRPM | OXYGEN SATURATION: 97 %

## 2020-07-21 LAB
A1AT SERPL-MCNC: 156 MG/DL
AFP-TM SERPL-MCNC: 3.7 NG/ML
ALBUMIN SERPL ELPH-MCNC: 2.9 G/DL — LOW (ref 3.4–5)
ALBUMIN SERPL ELPH-MCNC: 3.8 G/DL
ALP BLD-CCNC: 166 U/L
ALP SERPL-CCNC: 154 U/L — HIGH (ref 40–120)
ALT FLD-CCNC: 36 U/L — SIGNIFICANT CHANGE UP (ref 12–42)
ALT SERPL-CCNC: 30 U/L
ANA SER IF-ACNC: NEGATIVE
ANION GAP SERPL CALC-SCNC: 11 MMOL/L — SIGNIFICANT CHANGE UP (ref 9–16)
APTT BLD: 33.4 SEC — SIGNIFICANT CHANGE UP (ref 27.5–35.5)
AST SERPL-CCNC: 88 U/L
AST SERPL-CCNC: 88 U/L — HIGH (ref 15–37)
BASOPHILS # BLD AUTO: 0.02 K/UL — SIGNIFICANT CHANGE UP (ref 0–0.2)
BASOPHILS # BLD AUTO: 0.03 K/UL
BASOPHILS NFR BLD AUTO: 0.2 % — SIGNIFICANT CHANGE UP (ref 0–2)
BASOPHILS NFR BLD AUTO: 0.6 %
BILIRUB DIRECT SERPL-MCNC: 1.4 MG/DL
BILIRUB INDIRECT SERPL-MCNC: 0.8 MG/DL
BILIRUB SERPL-MCNC: 2.2 MG/DL
BILIRUB SERPL-MCNC: 2.7 MG/DL — HIGH (ref 0.2–1.2)
BUN SERPL-MCNC: 9 MG/DL — SIGNIFICANT CHANGE UP (ref 7–23)
CALCIUM SERPL-MCNC: 8.9 MG/DL — SIGNIFICANT CHANGE UP (ref 8.5–10.5)
CERULOPLASMIN SERPL-MCNC: 28 MG/DL
CHLORIDE SERPL-SCNC: 101 MMOL/L — SIGNIFICANT CHANGE UP (ref 96–108)
CO2 SERPL-SCNC: 24 MMOL/L — SIGNIFICANT CHANGE UP (ref 22–31)
CREAT SERPL-MCNC: 0.82 MG/DL — SIGNIFICANT CHANGE UP (ref 0.5–1.3)
EOSINOPHIL # BLD AUTO: 0 K/UL — SIGNIFICANT CHANGE UP (ref 0–0.5)
EOSINOPHIL # BLD AUTO: 0.05 K/UL
EOSINOPHIL NFR BLD AUTO: 0 % — SIGNIFICANT CHANGE UP (ref 0–6)
EOSINOPHIL NFR BLD AUTO: 0.9 %
ETHANOL SERPL-MCNC: <3 MG/DL — SIGNIFICANT CHANGE UP
FERRITIN SERPL-MCNC: 20 NG/ML
GLUCOSE SERPL-MCNC: 101 MG/DL — HIGH (ref 70–99)
HBV CORE IGG+IGM SER QL: NONREACTIVE
HBV CORE IGM SER QL: NONREACTIVE
HBV SURFACE AB SER QL: NONREACTIVE
HBV SURFACE AG SER QL: NONREACTIVE
HCT VFR BLD CALC: 24.3 % — LOW (ref 34.5–45)
HCT VFR BLD CALC: 26.6 %
HCV AB SER QL: NONREACTIVE
HCV S/CO RATIO: 0.11 S/CO
HEPATITIS A IGG ANTIBODY: REACTIVE
HGB BLD-MCNC: 8 G/DL
HGB BLD-MCNC: 8.1 G/DL — LOW (ref 11.5–15.5)
IMM GRANULOCYTES NFR BLD AUTO: 0.2 %
IMM GRANULOCYTES NFR BLD AUTO: 0.6 % — SIGNIFICANT CHANGE UP (ref 0–1.5)
INR BLD: 1.33 — HIGH (ref 0.88–1.16)
INR PPP: 1.11 RATIO
IRON SATN MFR SERPL: 12 %
IRON SERPL-MCNC: 38 UG/DL
LACTATE SERPL-SCNC: 1.9 MMOL/L — SIGNIFICANT CHANGE UP (ref 0.4–2)
LIDOCAIN IGE QN: 131 U/L — SIGNIFICANT CHANGE UP (ref 73–393)
LYMPHOCYTES # BLD AUTO: 0.75 K/UL — LOW (ref 1–3.3)
LYMPHOCYTES # BLD AUTO: 1.01 K/UL
LYMPHOCYTES # BLD AUTO: 6 % — LOW (ref 13–44)
LYMPHOCYTES NFR BLD AUTO: 19.1 %
MAN DIFF?: NORMAL
MCHC RBC-ENTMCNC: 28 PG
MCHC RBC-ENTMCNC: 28.6 PG — SIGNIFICANT CHANGE UP (ref 27–34)
MCHC RBC-ENTMCNC: 30.1 GM/DL
MCHC RBC-ENTMCNC: 33.3 GM/DL — SIGNIFICANT CHANGE UP (ref 32–36)
MCV RBC AUTO: 85.9 FL — SIGNIFICANT CHANGE UP (ref 80–100)
MCV RBC AUTO: 93 FL
MITOCHONDRIA AB SER IF-ACNC: NORMAL
MONOCYTES # BLD AUTO: 0.67 K/UL
MONOCYTES # BLD AUTO: 1.08 K/UL — HIGH (ref 0–0.9)
MONOCYTES NFR BLD AUTO: 12.6 %
MONOCYTES NFR BLD AUTO: 8.7 % — SIGNIFICANT CHANGE UP (ref 2–14)
NEUTROPHILS # BLD AUTO: 10.54 K/UL — HIGH (ref 1.8–7.4)
NEUTROPHILS # BLD AUTO: 3.53 K/UL
NEUTROPHILS NFR BLD AUTO: 66.6 %
NEUTROPHILS NFR BLD AUTO: 84.5 % — HIGH (ref 43–77)
NRBC # BLD: 0 /100 WBCS — SIGNIFICANT CHANGE UP (ref 0–0)
PLATELET # BLD AUTO: 179 K/UL — SIGNIFICANT CHANGE UP (ref 150–400)
PLATELET # BLD AUTO: 229 K/UL
POTASSIUM SERPL-MCNC: 3.9 MMOL/L — SIGNIFICANT CHANGE UP (ref 3.5–5.3)
POTASSIUM SERPL-SCNC: 3.9 MMOL/L — SIGNIFICANT CHANGE UP (ref 3.5–5.3)
PROT SERPL-MCNC: 6.2 G/DL
PROT SERPL-MCNC: 6.2 G/DL — LOW (ref 6.4–8.2)
PROTHROM AB SERPL-ACNC: 15.5 SEC — HIGH (ref 10.6–13.6)
PT BLD: 13 SEC
RBC # BLD: 2.83 M/UL — LOW (ref 3.8–5.2)
RBC # BLD: 2.86 M/UL
RBC # FLD: 17.7 % — HIGH (ref 10.3–14.5)
RBC # FLD: 18 %
SMOOTH MUSCLE AB SER QL IF: NORMAL
SODIUM SERPL-SCNC: 136 MMOL/L — SIGNIFICANT CHANGE UP (ref 132–145)
TIBC SERPL-MCNC: 314 UG/DL
TRANSFERRIN SERPL-MCNC: 247 MG/DL
UIBC SERPL-MCNC: 276 UG/DL
WBC # BLD: 12.46 K/UL — HIGH (ref 3.8–10.5)
WBC # FLD AUTO: 12.46 K/UL — HIGH (ref 3.8–10.5)
WBC # FLD AUTO: 5.3 K/UL

## 2020-07-21 PROCEDURE — 71045 X-RAY EXAM CHEST 1 VIEW: CPT | Mod: 26

## 2020-07-21 RX ORDER — KETOROLAC TROMETHAMINE 30 MG/ML
15 SYRINGE (ML) INJECTION ONCE
Refills: 0 | Status: DISCONTINUED | OUTPATIENT
Start: 2020-07-21 | End: 2020-07-21

## 2020-07-21 RX ORDER — ACETAMINOPHEN 500 MG
650 TABLET ORAL ONCE
Refills: 0 | Status: COMPLETED | OUTPATIENT
Start: 2020-07-21 | End: 2020-07-21

## 2020-07-21 RX ORDER — SODIUM CHLORIDE 9 MG/ML
1650 INJECTION INTRAMUSCULAR; INTRAVENOUS; SUBCUTANEOUS ONCE
Refills: 0 | Status: COMPLETED | OUTPATIENT
Start: 2020-07-21 | End: 2020-07-21

## 2020-07-21 RX ORDER — CEFTRIAXONE 500 MG/1
1000 INJECTION, POWDER, FOR SOLUTION INTRAMUSCULAR; INTRAVENOUS ONCE
Refills: 0 | Status: COMPLETED | OUTPATIENT
Start: 2020-07-21 | End: 2020-07-21

## 2020-07-21 RX ORDER — SODIUM CHLORIDE 9 MG/ML
1000 INJECTION INTRAMUSCULAR; INTRAVENOUS; SUBCUTANEOUS ONCE
Refills: 0 | Status: COMPLETED | OUTPATIENT
Start: 2020-07-21 | End: 2020-07-21

## 2020-07-21 RX ADMIN — Medication 15 MILLIGRAM(S): at 21:51

## 2020-07-21 RX ADMIN — SODIUM CHLORIDE 1650 MILLILITER(S): 9 INJECTION INTRAMUSCULAR; INTRAVENOUS; SUBCUTANEOUS at 20:00

## 2020-07-21 RX ADMIN — Medication 15 MILLIGRAM(S): at 21:17

## 2020-07-21 RX ADMIN — Medication 650 MILLIGRAM(S): at 21:17

## 2020-07-21 RX ADMIN — CEFTRIAXONE 1000 MILLIGRAM(S): 500 INJECTION, POWDER, FOR SOLUTION INTRAMUSCULAR; INTRAVENOUS at 21:17

## 2020-07-21 RX ADMIN — CEFTRIAXONE 100 MILLIGRAM(S): 500 INJECTION, POWDER, FOR SOLUTION INTRAMUSCULAR; INTRAVENOUS at 20:25

## 2020-07-21 RX ADMIN — SODIUM CHLORIDE 1650 MILLILITER(S): 9 INJECTION INTRAMUSCULAR; INTRAVENOUS; SUBCUTANEOUS at 21:17

## 2020-07-21 RX ADMIN — Medication 650 MILLIGRAM(S): at 20:24

## 2020-07-21 RX ADMIN — SODIUM CHLORIDE 1000 MILLILITER(S): 9 INJECTION INTRAMUSCULAR; INTRAVENOUS; SUBCUTANEOUS at 21:17

## 2020-07-21 NOTE — ED ADULT NURSE NOTE - OBJECTIVE STATEMENT
Pt w/ PMH of liver disease 2ndary to ETOH abuse presents to ED today c/o 3/10 RUQ pain w/ fever/chills and general malaise x3 days.  Pt states "I tried to take a few tylenol, but I really shouldn't with my liver issues."  Pt denies N/V/D, dysuria or cough.  Pt upgraded for sepsis protocol.  Pt on [facility], will continue to monitor.

## 2020-07-21 NOTE — ED PROVIDER NOTE - PHYSICAL EXAMINATION
Physical Exam  GEN: Awake, alert, non-toxic appearing, NCAT  EYES: full EOMI,  ENT: External inspection normal, normal voice,   HEAD: atraumatic  NECK: FROM neck, supple, no meningismus, trachea midline, no JVD  CV: rrr, no edema  RESP: cta bl, no tachypnea, no hypoxia, no resp distress,  GI: Soft, slightly distended, generalized tenderness, no rebound/guarding, bedside US noted mild ascites  MSK: FROM all 4 extremities,   SKIN: Color normal for race, warm and dry, no rash  NEURO: ao x 3, clear speech, yeager x 4, strengths equal in all extremities

## 2020-07-21 NOTE — ED PROVIDER NOTE - CLINICAL SUMMARY MEDICAL DECISION MAKING FREE TEXT BOX
abdominal distension, fever, hx of cirrhosis, no other obvious source, will need to r/o SBP, empiric abx was given.  no peritoneal sign on exam.  noted mild ascites on bedside US only, not able to perform bedside abdominal paracentesis given risk of potential bowel injury, covid swab sent out, will need admission and IR evaluation for paracentesis as needed

## 2020-07-21 NOTE — ED ADULT NURSE REASSESSMENT NOTE - NS ED NURSE REASSESS COMMENT FT1
pt able to ambulate to the restroom freely with strong steady gait. Pt not able to obtain urine specimen. Will continue to assess. Report received from CHUNG Qureshi. pt AAOX3 able to ambulate to the restroom freely with strong steady gait. Pt not able to obtain urine specimen. Will continue to assess.

## 2020-07-21 NOTE — ED ADULT TRIAGE NOTE - CHIEF COMPLAINT QUOTE
c/o "liver issues", generalized abdominal pain with bloating since yesterday afternoon, felt warm today with HA and dizziness. as per pt, had 6L of fluid removed due to her ascites 3 weeks ago. SIRS criteria met, protocol initiated and followed.

## 2020-07-21 NOTE — ED ADULT NURSE NOTE - ED CARDIAC RHYTHM
11/15/2018         RE: Patric Chakraborty  [address]  [place] [address]        Dear Colleague,    Thank you for referring your patient, Patric Chakraborty, to the [facility]. Please see a copy of my visit note below.    ASSESSMENT & PLAN    1. Right wrist pain    2. Sprain of right wrist, initial encounter      Reviewed xray - no fracture  Would limit hanging / tight  activities or extremes of motion. Otherwise she can self-regulate  Hand therapy: [facility] - [phone]  Would expect a return to full activity over the next 2 weeks / after 2 hand therapy visits    Follow-up as needed.    -----    SUBJECTIVE  Patric Chakraborty is a/an 6 year old Right handed female who is seen as a self referral for evaluation of right wrist pain. The patient is seen with their father.    Onset: 2 week(s) ago. Patient describes injury as a fall off the high bar at gymnastics. Patient landed on her right wrist with the wrist in flexion.  Location of Pain: right radial aspect of wrist  Rating of Pain at worst: 7/10  Rating of Pain Currently: 7/10  Worsened by: making a fist, pushing up from a chair, monkey bars  Better with: rest/activity avoidance  Treatments tried: rest/activity avoidance, ice and ace wrap  Associated symptoms: no distal numbness or tingling; denies swelling or warmth  Orthopedic history: NO  Relevant surgical history: NO  Patient Social History: [employer], 2 grade    Patient's past medical, surgical, social, and family histories were reviewed today and no changes are noted.    REVIEW OF SYSTEMS:  10 point ROS is negative other than symptoms noted above in HPI, Past Medical History or as stated below  Constitutional: NEGATIVE for fever, chills, change in weight  Skin: NEGATIVE for worrisome rashes, moles or lesions  GI/: NEGATIVE for bowel or bladder changes  Neuro: NEGATIVE for weakness, dizziness or paresthesias    OBJECTIVE:  Ht 4'  "4.75\" (1.34 m)  Wt 55 lb 8 oz (25.2 kg)  BMI 14.02 kg/m2   General: healthy, alert and in no distress  HEENT: no scleral icterus or conjunctival erythema  Skin: no suspicious lesions or rash. No jaundice.  CV: regular rhythm by palpation  Resp: normal respiratory effort without conversational dyspnea   Psych: normal mood and affect  Gait: normal steady gait with appropriate coordination and balance  Neuro: Normal sensory exam of bilateral hands. Normal 2 pt discrimination.   MSK:  RIGHT WRIST  Inspection:    No swelling or obvious deformity or asymmetry  Palpation:    With distraction has no pain with palpation over distal radius, growth plate, ulnar styloid or proximal carpal row.   Range of Motion:    Full and reports discomfort with end range of extension and flexion  Strength:     strength full. Normal pinch strength.    Independent visualization of the below image:  Recent Results (from the past 24 hour(s))   XR Wrist Right G/E 3 Views    Narrative    XR WRIST RT G/E 3 VW 11/15/2018 3:25 PM     HISTORY: ; Right wrist pain      Impression    IMPRESSION: Negative exam.    KELVIN SIMMONS MD     Patient's conditions were thoroughly discussed during today's visit with greater than 50% of the visit spent counseling the patient with total time spent face-to-face with the patient being 15 minutes.    Amy Adame DO [facility]        Again, thank you for allowing me to participate in the care of your patient.        Sincerely,        Amy Adame, DO    " regular

## 2020-07-21 NOTE — ED PROVIDER NOTE - OBJECTIVE STATEMENT
60 yof pw fever, and abdominal distension, and generalized weakness.  pt states fever and weakness x 2-3 days, abd distension noted today.  pt was concerned that it's going to get bigger.  hx of EtOH abuse, cirrhosis, anemia, prior UGIB.  pt was admitted to [facility] earlier this year and had abdominal paracentesis (reported 6L of fluids removed, at OSH).  denies cough/sob/cp/back pain/urinary sx/nvd/hematemesis/black stool/skin discoloration/ha/neck pain.  pt reports last drink was June 26th.

## 2020-07-21 NOTE — ED PROVIDER NOTE - PROGRESS NOTE DETAILS
I have re-evaluated the patient's fluid status and reviewed vital signs. Clinical perfusion assessment was performed. vitals improved, previous record reviewed, noted similar BP in the past.

## 2020-07-22 ENCOUNTER — APPOINTMENT (OUTPATIENT)
Dept: ULTRASOUND IMAGING | Facility: CLINIC | Age: 60
End: 2020-07-22

## 2020-07-22 ENCOUNTER — INPATIENT (INPATIENT)
Facility: HOSPITAL | Age: 60
LOS: 1 days | Discharge: HOME | DRG: 871 | End: 2020-07-24
Attending: STUDENT IN AN ORGANIZED HEALTH CARE EDUCATION/TRAINING PROGRAM | Admitting: STUDENT IN AN ORGANIZED HEALTH CARE EDUCATION/TRAINING PROGRAM
Payer: COMMERCIAL

## 2020-07-22 DIAGNOSIS — R18.8 OTHER ASCITES: ICD-10-CM

## 2020-07-22 DIAGNOSIS — R94.31 ABNORMAL ELECTROCARDIOGRAM [ECG] [EKG]: ICD-10-CM

## 2020-07-22 DIAGNOSIS — R63.8 OTHER SYMPTOMS AND SIGNS CONCERNING FOOD AND FLUID INTAKE: ICD-10-CM

## 2020-07-22 DIAGNOSIS — D64.9 ANEMIA, UNSPECIFIED: ICD-10-CM

## 2020-07-22 DIAGNOSIS — Z72.89 OTHER PROBLEMS RELATED TO LIFESTYLE: ICD-10-CM

## 2020-07-22 DIAGNOSIS — K70.30 ALCOHOLIC CIRRHOSIS OF LIVER WITHOUT ASCITES: ICD-10-CM

## 2020-07-22 DIAGNOSIS — A41.9 SEPSIS, UNSPECIFIED ORGANISM: ICD-10-CM

## 2020-07-22 DIAGNOSIS — Z87.19 PERSONAL HISTORY OF OTHER DISEASES OF THE DIGESTIVE SYSTEM: ICD-10-CM

## 2020-07-22 LAB
ALBUMIN FLD-MCNC: 0.6 G/DL — SIGNIFICANT CHANGE UP
ALBUMIN SERPL ELPH-MCNC: 2.9 G/DL — LOW (ref 3.3–5)
ALP SERPL-CCNC: 123 U/L — HIGH (ref 40–120)
ALT FLD-CCNC: 22 U/L — SIGNIFICANT CHANGE UP (ref 10–45)
ANION GAP SERPL CALC-SCNC: 10 MMOL/L — SIGNIFICANT CHANGE UP (ref 5–17)
APPEARANCE UR: CLEAR — SIGNIFICANT CHANGE UP
APTT BLD: 32.9 SEC — SIGNIFICANT CHANGE UP (ref 27.5–35.5)
AST SERPL-CCNC: 55 U/L — HIGH (ref 10–40)
B PERT IGG+IGM PNL SER: SIGNIFICANT CHANGE UP
BILIRUB SERPL-MCNC: 2.9 MG/DL — HIGH (ref 0.2–1.2)
BILIRUB UR-MCNC: NEGATIVE — SIGNIFICANT CHANGE UP
BLD GP AB SCN SERPL QL: NEGATIVE — SIGNIFICANT CHANGE UP
BUN SERPL-MCNC: 9 MG/DL — SIGNIFICANT CHANGE UP (ref 7–23)
CALCIUM SERPL-MCNC: 8.2 MG/DL — LOW (ref 8.4–10.5)
CHLORIDE SERPL-SCNC: 106 MMOL/L — SIGNIFICANT CHANGE UP (ref 96–108)
CO2 SERPL-SCNC: 21 MMOL/L — LOW (ref 22–31)
COLOR FLD: YELLOW — SIGNIFICANT CHANGE UP
COLOR SPEC: YELLOW — SIGNIFICANT CHANGE UP
CREAT SERPL-MCNC: 0.68 MG/DL — SIGNIFICANT CHANGE UP (ref 0.5–1.3)
DIFF PNL FLD: ABNORMAL
FLUID INTAKE SUBSTANCE CLASS: SIGNIFICANT CHANGE UP
FLUID SEGMENTED GRANULOCYTES: 87 % — SIGNIFICANT CHANGE UP
GLUCOSE SERPL-MCNC: 87 MG/DL — SIGNIFICANT CHANGE UP (ref 70–99)
GLUCOSE UR QL: NEGATIVE — SIGNIFICANT CHANGE UP
GRAM STN FLD: SIGNIFICANT CHANGE UP
HCT VFR BLD CALC: 23.7 % — LOW (ref 34.5–45)
HGB BLD-MCNC: 7.5 G/DL — LOW (ref 11.5–15.5)
INR BLD: 1.32 — HIGH (ref 0.88–1.16)
KETONES UR-MCNC: NEGATIVE — SIGNIFICANT CHANGE UP
LDH SERPL L TO P-CCNC: 76 U/L — SIGNIFICANT CHANGE UP
LEUKOCYTE ESTERASE UR-ACNC: NEGATIVE — SIGNIFICANT CHANGE UP
LYMPHOCYTES # FLD: 4 % — SIGNIFICANT CHANGE UP
MCHC RBC-ENTMCNC: 29 PG — SIGNIFICANT CHANGE UP (ref 27–34)
MCHC RBC-ENTMCNC: 31.6 GM/DL — LOW (ref 32–36)
MCV RBC AUTO: 91.5 FL — SIGNIFICANT CHANGE UP (ref 80–100)
MESOTHL CELL # FLD: 3 % — SIGNIFICANT CHANGE UP
MONOS+MACROS # FLD: 6 % — SIGNIFICANT CHANGE UP
NITRITE UR-MCNC: NEGATIVE — SIGNIFICANT CHANGE UP
NRBC # BLD: 0 /100 WBCS — SIGNIFICANT CHANGE UP (ref 0–0)
PH UR: 6 — SIGNIFICANT CHANGE UP (ref 5–8)
PLATELET # BLD AUTO: 141 K/UL — LOW (ref 150–400)
POTASSIUM SERPL-MCNC: 3.9 MMOL/L — SIGNIFICANT CHANGE UP (ref 3.5–5.3)
POTASSIUM SERPL-SCNC: 3.9 MMOL/L — SIGNIFICANT CHANGE UP (ref 3.5–5.3)
PROT FLD-MCNC: 0.9 G/DL — SIGNIFICANT CHANGE UP
PROT SERPL-MCNC: 5.1 G/DL — LOW (ref 6–8.3)
PROT UR-MCNC: NEGATIVE MG/DL — SIGNIFICANT CHANGE UP
PROTHROM AB SERPL-ACNC: 15.6 SEC — HIGH (ref 10.6–13.6)
RBC # BLD: 2.59 M/UL — LOW (ref 3.8–5.2)
RBC # FLD: 17.5 % — HIGH (ref 10.3–14.5)
RCV VOL RI: 1000 /UL — HIGH (ref 0–0)
RH IG SCN BLD-IMP: NEGATIVE — SIGNIFICANT CHANGE UP
SARS-COV-2 RNA SPEC QL NAA+PROBE: SIGNIFICANT CHANGE UP
SODIUM SERPL-SCNC: 137 MMOL/L — SIGNIFICANT CHANGE UP (ref 135–145)
SP GR SPEC: 1.02 — SIGNIFICANT CHANGE UP (ref 1–1.03)
SPECIMEN SOURCE FLD: SIGNIFICANT CHANGE UP
SPECIMEN SOURCE: SIGNIFICANT CHANGE UP
TOTAL NUCLEATED CELL COUNT, BODY FLUID: 6817 /UL — SIGNIFICANT CHANGE UP
TUBE TYPE: SIGNIFICANT CHANGE UP
UROBILINOGEN FLD QL: 1 E.U./DL — SIGNIFICANT CHANGE UP
WBC # BLD: 7.17 K/UL — SIGNIFICANT CHANGE UP (ref 3.8–10.5)
WBC # FLD AUTO: 7.17 K/UL — SIGNIFICANT CHANGE UP (ref 3.8–10.5)

## 2020-07-22 PROCEDURE — 99233 SBSQ HOSP IP/OBS HIGH 50: CPT

## 2020-07-22 PROCEDURE — 49083 ABD PARACENTESIS W/IMAGING: CPT

## 2020-07-22 PROCEDURE — 93010 ELECTROCARDIOGRAM REPORT: CPT

## 2020-07-22 PROCEDURE — 76705 ECHO EXAM OF ABDOMEN: CPT | Mod: 26

## 2020-07-22 PROCEDURE — 99223 1ST HOSP IP/OBS HIGH 75: CPT | Mod: GC

## 2020-07-22 PROCEDURE — 99291 CRITICAL CARE FIRST HOUR: CPT

## 2020-07-22 RX ORDER — ACETAMINOPHEN 500 MG
650 TABLET ORAL EVERY 6 HOURS
Refills: 0 | Status: DISCONTINUED | OUTPATIENT
Start: 2020-07-22 | End: 2020-07-23

## 2020-07-22 RX ORDER — THIAMINE MONONITRATE (VIT B1) 100 MG
100 TABLET ORAL DAILY
Refills: 0 | Status: DISCONTINUED | OUTPATIENT
Start: 2020-07-22 | End: 2020-07-24

## 2020-07-22 RX ORDER — CEFTRIAXONE 500 MG/1
1000 INJECTION, POWDER, FOR SOLUTION INTRAMUSCULAR; INTRAVENOUS ONCE
Refills: 0 | Status: COMPLETED | OUTPATIENT
Start: 2020-07-22 | End: 2020-07-22

## 2020-07-22 RX ORDER — PANTOPRAZOLE SODIUM 20 MG/1
40 TABLET, DELAYED RELEASE ORAL
Refills: 0 | Status: DISCONTINUED | OUTPATIENT
Start: 2020-07-22 | End: 2020-07-24

## 2020-07-22 RX ORDER — CEFTRIAXONE 500 MG/1
2000 INJECTION, POWDER, FOR SOLUTION INTRAMUSCULAR; INTRAVENOUS EVERY 24 HOURS
Refills: 0 | Status: DISCONTINUED | OUTPATIENT
Start: 2020-07-23 | End: 2020-07-24

## 2020-07-22 RX ORDER — FOLIC ACID 0.8 MG
1 TABLET ORAL DAILY
Refills: 0 | Status: DISCONTINUED | OUTPATIENT
Start: 2020-07-22 | End: 2020-07-24

## 2020-07-22 RX ADMIN — Medication 5 MILLIGRAM(S): at 23:32

## 2020-07-22 RX ADMIN — PANTOPRAZOLE SODIUM 40 MILLIGRAM(S): 20 TABLET, DELAYED RELEASE ORAL at 06:32

## 2020-07-22 RX ADMIN — Medication 100 MILLIGRAM(S): at 12:06

## 2020-07-22 RX ADMIN — Medication 1 MILLIGRAM(S): at 12:06

## 2020-07-22 RX ADMIN — Medication 1 TABLET(S): at 12:06

## 2020-07-22 RX ADMIN — CEFTRIAXONE 100 MILLIGRAM(S): 500 INJECTION, POWDER, FOR SOLUTION INTRAMUSCULAR; INTRAVENOUS at 05:51

## 2020-07-22 NOTE — H&P ADULT - NSHPSOCIALHISTORY_GEN_ALL_CORE
Patient lives with her friend. Used to drink regularly, has stopped drinking alcohol (last drink 6/24/2020). Denies any tobacco, or other illicit drug use.

## 2020-07-22 NOTE — H&P ADULT - PROBLEM SELECTOR PLAN 8
Plan:   F: none  E: replete K<4, Mg<2  N: NPO for now pending IR procedure  VTE Prophylaxis: SCDs for now while DVT ppx held for IR procedure  C: Full Code  D: GAMALIEL Hx of alcohol use. Last drink 6/24/2020.  -c/w home thiamine, folic acid and multivitamin daily

## 2020-07-22 NOTE — H&P ADULT - PROBLEM SELECTOR PLAN 1
Pt with hx of alcoholic cirrhosis, s/p paracentesis about 3 weeks ago at OSH ([facility] in [facility]). She presents with fever 103.1F, leukocytosis, tachycardia , meeting SIRS criteria. She received total 2.65L NS bolus in the ED. Lactate 1.9. She also was given IV CTX 1g for SBP ppx. Given another 1g IV CTX while here at [facility] and ordered for IV CTX 2g qd for SBP.  - c/w IV CTX 2g qd for SBP coverage  - f/u BCx Pt with hx of alcoholic cirrhosis, s/p paracentesis about 3 weeks ago at OSH ([facility] in [facility]). She presents with fever 103.1F, leukocytosis, tachycardia , meeting SIRS criteria. She received total 2.65L NS bolus in the ED. Lactate 1.9. She also was given IV CTX 1g for SBP ppx. Given another 1g IV CTX while here at [facility] and ordered for IV CTX 2g qd for SBP.  - c/w IV CTX 2g qd for SBP coverage  - pending IR paracentesis in the AM  - f/u BCx Pt with hx of alcoholic cirrhosis, s/p paracentesis about 3 weeks ago at OSH ([facility] in [facility]). She presents with fever 103.1F, leukocytosis, tachycardia , meeting SIRS criteria. sBP ranges from  (previously around sBP 100s during last hospitalization). She received total 2.65L NS bolus in the ED. Lactate 1.9. She also was given IV CTX 1g for SBP ppx. Given another 1g IV CTX while here at [facility] and ordered for IV CTX 2g qd for SBP.  - c/w IV CTX 2g qd for SBP coverage  - pending IR paracentesis in the AM  - f/u BCx Pt with hx of alcoholic cirrhosis, s/p paracentesis about 3 weeks ago at OSH ([facility] in [facility]). She presents with fever 103.1F, leukocytosis, tachycardia , meeting SIRS criteria. sBP ranges from  (previously around sBP 100s during last hospitalization). She received total 2.65L NS bolus in the ED. Lactate 1.9. She also was given IV CTX 1g for SBP ppx. Given another 1g IV CTX while here at [facility] and ordered for IV CTX 2g qd for SBP.  - c/w IV CTX 2g qd for SBP coverage  - holding home propranolol given increased mortality if pt with SBP  - pending IR paracentesis in the AM  - AM GI consult  - f/u BCx

## 2020-07-22 NOTE — H&P ADULT - PROBLEM SELECTOR PLAN 4
After Visit Summary   5/16/2017    Jorge Lou    MRN: [id number]           Patient Information     Date Of Birth          1951        Visit Information        Provider Department      5/16/2017 10:30 AM Imani Garrison APRN CNP [facility] Colon and Rectal Surgery        Today's Diagnoses     Anal pain    -  1      Care Instructions    1. Diltiazem ointment 2% to be applied 3 times daily for 4 weeks  2.Tylenol, ibuprofen, and warm tub baths/sitz baths for any pain  3. Follow up in 4 weeks if still symptomatic. Would consider seeing urology if pain persists and would consider a flexible sigmoidoscopy.  4. Avoid constipation and straining        Follow-ups after your visit        Your next 10 appointments already scheduled     Jun 13, 2017 10:30 AM CDT   (Arrive by 10:15 AM)   Return Visit with HÉCTOR Mendoza CNP [facility] Colon and Rectal Surgery ([facility])    [address] [place] [id number] [phone]              Who to contact     Please call your clinic at [phone] to:    Ask questions about your health    Make or cancel appointments    Discuss your medicines    Learn about your test results    Speak to your doctor   If you have compliments or concerns about an experience at your clinic, or if you wish to file a complaint, please contact [facility] Patient Relations at [phone] or email us at [email or web address]         Additional Information About Your Visit        Read[employer] Information     [employer] gives you secure access to your electronic health record. If you see a primary care provider, you can also send messages to your care team and make appointments. If you have questions, please call your primary care clinic.  If you do not have a primary care provider, please call [phone] and they will assist you.      [employer] is an electronic gateway that  "provides easy, online access to your medical records. With [employer] Service Management, you can request a clinic appointment, read your test results, renew a prescription or communicate with your care team.     To access your existing account, please contact your [facility] or call [phone] for assistance.        Care EveryWhere ID     This is your Care EveryWhere ID. This could be used by other organizations to access your [facility] medical records  [id number]        Your Vitals Were     Pulse Temperature Height Pulse Oximetry BMI (Body Mass Index)       85 98.3  F (36.8  C) (Oral) 5' 9\" 96% 31.03 kg/m2        Blood Pressure from Last 3 Encounters:   05/16/17 120/72   04/18/17 122/81   04/12/17 110/82    Weight from Last 3 Encounters:   05/16/17 210 lb 1.6 oz   04/18/17 207 lb 11.2 oz   04/12/17 206 lb 4.8 oz              Today, you had the following     No orders found for display         Today's Medication Changes          These changes are accurate as of: 5/16/17 10:54 AM.  If you have any questions, ask your nurse or doctor.               Start taking these medicines.        Dose/Directions    diltiazem 2% in PLO cream (FV COMPOUNDED) 2% Gel   Used for:  Anal pain   Started by:  Imani Garrison APRN CNP        To anal opening three times daily.  Use a pea-sized amount.  Store at room temperature.   Quantity:  60 g   Refills:  0            Where to get your medicines      These medications were sent to [facility] [address] [place] [address] [address] [address]    Hours:  TRANSPLANT PHONE NUMBER [phone] Phone:  [phone]     diltiazem 2% in PLO cream (FV COMPOUNDED) 2% Gel                Primary Care Provider Office Phone # Fax #    Jorge Teresa [phone]       [facility] [address] [facility] [address]        Thank you!     Thank you for choosing [facility] " AND RECTAL SURGERY  for your care. Our goal is always to provide you with excellent care. Hearing back from our patients is one way we can continue to improve our services. Please take a few minutes to complete the written survey that you may receive in the mail after your visit with us. Thank you!             Your Updated Medication List - Protect others around you: Learn how to safely use, store and throw away your medicines at www.disposemymeds.org.          This list is accurate as of: 5/16/17 10:54 AM.  Always use your most recent med list.                   Brand Name Dispense Instructions for use    Chlordiazepoxide-Amitriptyline 5-12.5 MG Tabs     30 tablet    Take 1-2 tablets by mouth daily as needed       cyanocobalamin 1000 MCG tablet    vitamin  B-12     Take 5,000 mcg by mouth daily       cyclobenzaprine 10 MG tablet    FLEXERIL    42 tablet    Take 1 tablet (10 mg) by mouth 3 times daily as needed for muscle spasms       DHEA 50 MG Tabs      1 daily       diltiazem 2% in PLO cream (FV COMPOUNDED) 2% Gel     60 g    To anal opening three times daily.  Use a pea-sized amount.  Store at room temperature.       Fiber Powd      3 tsp daily       LOTEMAX OP      Apply 1 drop to eye 2 times daily Reported on 4/12/2017       multivitamin per tablet     100    Reported on 4/12/2017       RESTASIS 0.05 % ophthalmic emulsion   Generic drug:  cycloSPORINE      Place 1 drop into both eyes 2 times daily       SAW PALMETTO COMPLEX PO      1 cap 160mg       SYSTANE ULTRA OP      eye drops daily-PRN       thiamine 100 MG tablet      1 daily       TRIPLE OMEGA-3-6-9 Caps      1 cap       VITAMIN D (CHOLECALCIFEROL) PO      Take 5,000 Units by mouth daily          Hx of alcoholic cirrhosis with prior ascites. Follows Dr. Gissell sargent and is prescribed lasix 20mg qd, aldactone 50mg qd  - MELD-Na score of 15 (6% 3-month mortality)  - c/w lasix 20 mg, aldactone 50 mg daily  - f/u daily MELD labs (CMP, coags) Hx of alcoholic cirrhosis with prior ascites. Follows Dr. Gissell lay and is prescribed lasix 20mg qd, aldactone 50mg qd  - MELD-Na score of 15 (6% 3-month mortality)  - c/w lasix 20 mg, aldactone 50 mg daily  - f/u daily MELD labs (CMP, coags) Hx of alcoholic cirrhosis with prior ascites. Follows Dr. Gissell lay and is prescribed lasix 20mg qd, aldactone 50mg qd  - MELD-Na score of 15 (6% 3-month mortality)  - on lasix 20 mg, aldactone 50 mg daily at home  - currently holding lasix and aldactone given low sBP  - f/u daily MELD labs (CMP, coags) Hx of alcoholic cirrhosis with prior ascites. Follows Dr. Borrero outpt and is prescribed lasix 20mg qd, aldactone 50mg qd  - MELD-Na score of 15 (6% 3-month mortality)  - on lasix 20 mg, aldactone 50 mg daily at home  - currently holding lasix and aldactone given low sBP  - f/u daily MELD labs (CMP, coags)  - AM GI consult

## 2020-07-22 NOTE — SBIRT NOTE ADULT - NSSBIRTALCPOSREINDET_GEN_A_CORE
Pt. states she has been sober for 29 days. She was hospitalized after a fall in her home [facility] and was told that she "would die if (she did) not stop drinking." Pt. has not had a drink since then. She has had difficulty starting treatment because of COVID 19 and medical problems. Pt. stated she tried AA but did not like it because she found the program to be too [employer]. She plans to pursue treatment remotely and declined additional resources from .

## 2020-07-22 NOTE — H&P ADULT - ASSESSMENT
61 y/o woman with PMH ETOH abuse, alcoholic cirrhosis s/p abdominal paracentesis at OSH 3 weeks ago, anemia, prior upper GI bleeds, who presents with fevers at home and increasing abdominal distention associated with pain, and generalized weakness for the past 3 days, concerning for SBP.

## 2020-07-22 NOTE — H&P ADULT - NSHPLABSRESULTS_GEN_ALL_CORE
8.1    12.46 )-----------( 179      ( 21 Jul 2020 19:47 )             24.3     07-21    136  |  101  |  9   ----------------------------<  101<H>  3.9   |  24  |  0.82    Ca    8.9      21 Jul 2020 19:47    TPro  6.2<L>  /  Alb  2.9<L>  /  TBili  2.7<H>  /  DBili  x   /  AST  88<H>  /  ALT  36  /  AlkPhos  154<H>  07-21    LIVER FUNCTIONS - ( 21 Jul 2020 19:47 )  Alb: 2.9 g/dL / Pro: 6.2 g/dL / ALK PHOS: 154 U/L / ALT: 36 U/L / AST: 88 U/L / GGT: x           PT/INR - ( 21 Jul 2020 19:47 )   PT: 15.5 sec;   INR: 1.33          PTT - ( 21 Jul 2020 19:47 )  PTT:33.4 sec          Radiology and other tests: Reviewed.

## 2020-07-22 NOTE — H&P ADULT - PROBLEM SELECTOR PLAN 3
Pt presented with 2-3 days of increasing abdominal pain with distention. Bedside ultrasound of abdomen performed at [facility] ED showed mild ascites.  - plan for IR-guided paracentesis Hx of alcoholic cirrhosis with prior ascites. Pt presented with 2-3 days of increasing abdominal pain with distention. Bedside ultrasound of abdomen performed at [facility] ED showed mild ascites. Differential dx includes 2/2 SBP vs alcoholic cirrhosis.  - plan for IR-guided paracentesis in the morning

## 2020-07-22 NOTE — PATIENT PROFILE ADULT - NSPRONUTRITIONRISK_GEN_A_NUR
Pt was informed to take 3tabs of nifedipine 30mg on 1/2/20 per provider. [facility] stated pt needs new order for nifedipine 90mg sent to pharmacy because they have a limit on this medication and the order metoprolol 50mg  2tabs qd to be sent to pharmacy because pharmacy still has the old order metoprolol 50mg 1tab qd in system. Message routed to provider     No indicators present

## 2020-07-22 NOTE — CONSULT NOTE ADULT - SUBJECTIVE AND OBJECTIVE BOX
GASTROENTEROLOGY CONSULT NOTE  HPI:  Ms. Avery is a 61 y/o woman with PMH ETOH abuse, alcoholic cirrhosis s/p abdominal paracentesis at OSH 3 weeks ago, anemia, prior upper GI bleeds, who presents with fevers at home and increasing abdominal distention associated with pain, and generalized weakness for the past 3 days. Pt reports her last alcoholic drink was June 24th and has not drank any alcoholic beverages recently. She was started on furosemide and spironolactone after her recent hospitalization at OSH where she had her last abdominal paracentesis s/p 6L fluid removal. Pt reports medication compliance. Denies any SOB, CP, cough, dysuria, hematemesis, dark stools.     [facility] ED Vitals: T 103.1F, BP 97/60, , RR 18, SpO2 97% RA  [facility] ED Labs: WBC 12.46, Hb 8.1, Cr 0.82, BUN 9, Tbili 2.7 (1.7 in March), , AST 88/ALT 36, lactate 1.9. Lipase 131. Alcohol level <3  [facility] ED Imaging: Bedside U/S of abdomen showing mild ascites. No paracentesis done.  [facility] ED Management: IV CTX 1g x1, NS bolus 2.65L total, IV ketorolac 15mg x1, acetaminophen 650mg x1 (22 Jul 2020 04:04)    GI consulted for concern for SBP. Patient was started on abx therapy prior to obtaining a paracentesis. EGD done at OSH in [facility], patient reports varices.     Allergies    Cipro (Hives; Urticaria)    Intolerances      Home Medications:  Lasix 20 mg oral tablet: 1 tab(s) orally once a day (22 Jul 2020 04:31)  spironolactone 50 mg oral tablet: 1 tab(s) orally once a day (22 Jul 2020 04:32)    MEDICATIONS:  MEDICATIONS  (STANDING):  folic acid 1 milliGRAM(s) Oral daily  multivitamin 1 Tablet(s) Oral daily  pantoprazole    Tablet 40 milliGRAM(s) Oral before breakfast  thiamine 100 milliGRAM(s) Oral daily    MEDICATIONS  (PRN):    PAST MEDICAL & SURGICAL HISTORY:  Cirrhosis  Pyelonephritis  Gastritis  Lyme disease  No significant past surgical history    FAMILY HISTORY:  No pertinent family history in first degree relatives    SOCIAL HISTORY:  [place]: [ ] Current, [ ] Former, [ ] Never; Pack Years:  Alcohol:  Illicit Drugs:    REVIEW OF SYSTEMS:  CONSTITUTIONAL: No weakness, fevers or chills  HEENT: No visual changes; No vertigo or throat pain   NECK: No pain or stiffness  RESPIRATORY: No cough, wheezing, hemoptysis; No shortness of breath  CARDIOVASCULAR: No chest pain or palpitations  GASTROINTESTINAL: As above.  GENITOURINARY: No dysuria, frequency or hematuria  NEUROLOGICAL: No numbness or weakness  SKIN: No itching, burning, rashes, or lesions   All other 10 review of systems is negative unless indicated above.    Vital Signs Last 24 Hrs  T(C): 38 (22 Jul 2020 16:49), Max: 38 (22 Jul 2020 16:49)  T(F): 100.4 (22 Jul 2020 16:49), Max: 100.4 (22 Jul 2020 16:49)  HR: 98 (22 Jul 2020 16:49) (88 - 108)  BP: 112/73 (22 Jul 2020 16:49) (93/56 - 112/73)  BP(mean): --  RR: 18 (22 Jul 2020 16:49) (16 - 19)  SpO2: 98% (22 Jul 2020 16:49) (96% - 100%)      PHYSICAL EXAM:    General: Well developed; well nourished; in no acute distress  Eyes: Anicteric sclerae, moist conjunctivae  HENT: Moist mucous membranes  Neck: Trachea midline, supple  Lungs: Normal respiratory effort, no intercostal retractions  Cardiovascular: RRR  Abdomen: Soft, non-tender; mildly distended; Normal bowel sounds; No rebound or guarding  Extremities: Normal range of motion, No clubbing, cyanosis or edema  Neurological: Alert and oriented x3  Skin: Warm and dry. No obvious rash    LABS:                        7.5    7.17  )-----------( 141      ( 22 Jul 2020 11:25 )             23.7     07-22    137  |  106  |  9   ----------------------------<  87  3.9   |  21<L>  |  0.68    Ca    8.2<L>      22 Jul 2020 11:25    TPro  5.1<L>  /  Alb  2.9<L>  /  TBili  2.9<H>  /  DBili  x   /  AST  55<H>  /  ALT  22  /  AlkPhos  123<H>  07-22        PT/INR - ( 22 Jul 2020 11:25 )   PT: 15.6 sec;   INR: 1.32          PTT - ( 22 Jul 2020 11:25 )  PTT:32.9 sec    Culture - Body Fluid with Gram Stain (collected 22 Jul 2020 16:54)  Source: Peritoneal peritoneal fluid  Gram Stain (22 Jul 2020 18:47):    No organisms seen    Few WBC's      RADIOLOGY & ADDITIONAL STUDIES:     Reviewed

## 2020-07-22 NOTE — H&P ADULT - ATTENDING COMMENTS
patient seen and examined overnight  a/f sepsis r/o SBP  reviewed pertinent data, h&p  PE as above, pt in NAD, speaking full sentences, reports abdominal discomfort on palpation of all abdominal quadrants, +moderate abdominal distension    1. sepsis r/o SBP: pending IR diagnostic paracentesis, followup studies and cultures, c/w ceftriaxone; holding Beta blocker and diuretics; consult GI     rest of plan as above

## 2020-07-22 NOTE — CONSULT NOTE ADULT - ASSESSMENT
60F w/ a PMH significant for EtOH abuse, alcoholic cirrhosis, anemia, prior upper GI bleeds, who presents with fevers and increasing abdominal distention associated with pain and generalized weakness x3 days.    #Cirrhosis - MELD-Na: 16  PSE: none appreciated  Ascites: moderate, s/p paracentesis w/ 2L removed  HCC: none seen on US  Varices: seen on EGD at OSH in 6/2020  - please obtain records from OSH in [facility]  - continue ceftriaxone for a total of 5 days  - based on AASLD guidelines, would consider 1.5 g/kg albumin now and then 1 g/kg after 48 hours as it has shown some benefit  - low salt diet  - hold diuretics for now, will re-consider initiation tomorrow  - f/u ascites fluid studies, though would be cautious in their interpretation as patient received abx prior to paracentesis  - will require lifelong abx SBP ppx with bactrim one DS tablet daily given cipro allergy  - daily MELD labs: CMP, INR    Clyde Sparks MD  PGY-4, Gastroenterology Fellow  pager: [phone] 60F w/ a PMH significant for EtOH abuse, alcoholic cirrhosis, anemia, prior upper GI bleeds, who presents with fevers and increasing abdominal distention associated with pain and generalized weakness x3 days.    #Cirrhosis w/ presumed SBP - MELD-Na: 16  PSE: none appreciated  Ascites: moderate, s/p paracentesis w/ 2L removed  HCC: none seen on US  Varices: seen on EGD at OSH in 6/2020  - please obtain records from OSH in [facility]  - continue ceftriaxone for a total of 5 days  - based on AASLD guidelines, would consider 1.5 g/kg albumin now and then 1 g/kg after 48 hours as it has shown some benefit  - low salt diet  - hold diuretics for now, will re-consider initiation tomorrow  - f/u ascites fluid studies, though would be cautious in their interpretation as patient received abx prior to paracentesis  - will require lifelong abx SBP ppx with bactrim one DS tablet daily given cipro allergy  - daily MELD labs: CMP, INR    Clyde Sparks MD  PGY-4, Gastroenterology Fellow  pager: [phone] 60F w/ a PMH significant for EtOH abuse, alcoholic cirrhosis, anemia, prior upper GI bleeds, who presents with fevers and increasing abdominal distention associated with pain and generalized weakness x3 days.    #Cirrhosis w/ presumed SBP - MELD-Na: 16  PSE: none appreciated  Ascites: moderate, s/p paracentesis w/ 2L removed  HCC: none seen on US  Varices: seen on EGD at OSH in 6/2020  - please obtain records from OSH in [facility]  - continue ceftriaxone for a total of 5 days  - based on AASLD guidelines, would consider 1.5 g/kg albumin now and then 1 g/kg after 48 hours as it has shown some benefit  - low salt diet  - hold diuretics for now, will re-consider initiation tomorrow  - f/u ascites fluid studies, though would be cautious in their interpretation as patient received abx prior to paracentesis  - will require lifelong abx SBP ppx with bactrim one DS tablet daily given cipro allergy  - daily MELD labs: CMP, INR  - please add-on to ascites fluid studies: cell counts, culture, albumin and total protein, LDH, cytology    Clyde Sparks MD  PGY-4, Gastroenterology Fellow  pager: [phone]

## 2020-07-22 NOTE — H&P ADULT - PROBLEM SELECTOR PLAN 6
See above Hb 8.1 on admission (baseline 8s-9s during prior hospitalization). Normocytic. Currently no signs of active bleed  - continue to monitor Hb daily  - transfuse for Hb<7  - maintain active T/S ADDENDUM: seen on initial EKG, however poor quality, will repeat, followup QTc

## 2020-07-22 NOTE — H&P ADULT - NSHPREVIEWOFSYSTEMS_GEN_ALL_CORE
REVIEW OF SYSTEMS:    CONSTITUTIONAL: No weakness, fevers or chills  EYES/ENT: No visual changes;  No vertigo or throat pain . No jaundice  NECK: No pain or stiffness  RESPIRATORY: No cough, wheezing, hemoptysis; No shortness of breath  CARDIOVASCULAR: No chest pain or palpitations  GASTROINTESTINAL: +abdominal pain. No nausea, vomiting, or hematemesis; No diarrhea or constipation. No melena or hematochezia.  GENITOURINARY: No dysuria, frequency or hematuria  NEUROLOGICAL: No numbness or weakness  SKIN: No itching, burning, rashes, or lesions   All other review of systems is negative unless indicated above.

## 2020-07-22 NOTE — H&P ADULT - PROBLEM SELECTOR PLAN 9
Plan:   F: none  E: replete K<4, Mg<2  N: NPO for now pending IR procedure  VTE Prophylaxis: SCDs for now while DVT ppx held for IR procedure  C: Full Code  D: GAMALIEL

## 2020-07-22 NOTE — H&P ADULT - PROBLEM SELECTOR PLAN 5
Pt with hx of GI bleed with melena and hematemesis, including prior admission in March 2020 and last episode in June 2020. Currently without any hematemesis or melena. Hb 8.1 on admission (baseline 8s-9s during prior hospitalization).  - c/w protonix 40mg po qd  - continue to monitor with daily CBC and signs of bleeding  - keep active T/S

## 2020-07-22 NOTE — H&P ADULT - NSHPPHYSICALEXAM_GEN_ALL_CORE
GENERAL: In NAD. Lying in bed comfortably.  HEENT: NC/AT. PERRL. EOMI. Neck supple. No JVD. No thyromegaly. No jaundice  CV: RRR. +S1/S2. No murmurs, rubs or gallops  LUNGS: Clear to auscultation b/l. No wheezing, rales or rhonchi.  ABDOMEN: Soft, moderately distended, and tender to palpation in all 4 quadrants. +BS. No guarding or rebound tenderness.  EXT: WWP. No edema in b/l extremities  VASCULAR: 2+ radial, DP bilaterally  NEURO: AAOx3. No asterixis

## 2020-07-22 NOTE — H&P ADULT - PROBLEM SELECTOR PLAN 7
Plan:   F: none  E: replete K<4, Mg<2  N: NPO for now pending IR procedure  VTE Prophylaxis: SCDs for now while DVT ppx held for IR procedure  C: Full Code  D: GAMALIEL Hx of alcohol use. Last drink 6/24/2020.  -c/w home thiamine, folic acid and multivitamin daily Hb 8.1 on admission (baseline 8s-9s during prior hospitalization). Normocytic. Currently no signs of active bleed  - continue to monitor Hb daily  - transfuse for Hb<7  - maintain active T/S

## 2020-07-22 NOTE — H&P ADULT - HISTORY OF PRESENT ILLNESS
60F with PMH ETOH abuse, alcoholic cirrhosis s/p abdominal paracentesis at OSH 3 weeks ago, anemia, prior upper GI bleeds, who presents with fevers at home and increasing abdominal distention associated with pain, and generalized weakness for the past 3 days. Pt reports her last alcoholic drink was June 24th and has not drank any alcoholic beverages recently. She was started on furosemide and spironolactone after her recent hospitalization at OSH where she had her last abdominal paracentesis s/p 6L fluid removal. Pt reports medication compliance. Denies any SOB, CP, cough, dysuria, hematemesis, dark stools.     [facility] ED Vitals: T 103.1F, BP 97/60, , RR 18, SpO2 97% RA  [facility] ED Labs: WBC 12.46, Hb 8.1, Cr 0.82, BUN 9, Tbili 2.7 (1.7 in March), , AST 88/ALT 36, lactate 1.9. Alcohol level <3  [facility] ED Imaging: Bedside U/S of abdomen showing mild ascites. No paracentesis done.  [facility] ED Management: IV CTX 1g x1, NS bolus 2.65L total, IV ketorolac 15mg x1, acetaminophen 650mg x1 Ms. Avery is a 61 y/o woman with PMH ETOH abuse, alcoholic cirrhosis s/p abdominal paracentesis at OSH 3 weeks ago, anemia, prior upper GI bleeds, who presents with fevers at home and increasing abdominal distention associated with pain, and generalized weakness for the past 3 days. Pt reports her last alcoholic drink was June 24th and has not drank any alcoholic beverages recently. She was started on furosemide and spironolactone after her recent hospitalization at OSH where she had her last abdominal paracentesis s/p 6L fluid removal. Pt reports medication compliance. Denies any SOB, CP, cough, dysuria, hematemesis, dark stools.     [facility] ED Vitals: T 103.1F, BP 97/60, , RR 18, SpO2 97% RA  [facility] ED Labs: WBC 12.46, Hb 8.1, Cr 0.82, BUN 9, Tbili 2.7 (1.7 in March), , AST 88/ALT 36, lactate 1.9. Alcohol level <3  [facility] ED Imaging: Bedside U/S of abdomen showing mild ascites. No paracentesis done.  [facility] ED Management: IV CTX 1g x1, NS bolus 2.65L total, IV ketorolac 15mg x1, acetaminophen 650mg x1 Ms. Avrey is a 61 y/o woman with PMH ETOH abuse, alcoholic cirrhosis s/p abdominal paracentesis at OSH 3 weeks ago, anemia, prior upper GI bleeds, who presents with fevers at home and increasing abdominal distention associated with pain, and generalized weakness for the past 3 days. Pt reports her last alcoholic drink was June 24th and has not drank any alcoholic beverages recently. She was started on furosemide and spironolactone after her recent hospitalization at OSH where she had her last abdominal paracentesis s/p 6L fluid removal. Pt reports medication compliance. Denies any SOB, CP, cough, dysuria, hematemesis, dark stools.     [facility] ED Vitals: T 103.1F, BP 97/60, , RR 18, SpO2 97% RA  [facility] ED Labs: WBC 12.46, Hb 8.1, Cr 0.82, BUN 9, Tbili 2.7 (1.7 in March), , AST 88/ALT 36, lactate 1.9. Lipase 131. Alcohol level <3  [facility] ED Imaging: Bedside U/S of abdomen showing mild ascites. No paracentesis done.  [facility]: IV CTX 1g x1, NS bolus 2.65L total, IV ketorolac 15mg x1, acetaminophen 650mg x1

## 2020-07-23 ENCOUNTER — RESULT REVIEW (OUTPATIENT)
Age: 60
End: 2020-07-23

## 2020-07-23 LAB
ALBUMIN SERPL ELPH-MCNC: 2.6 G/DL — LOW (ref 3.3–5)
ALP SERPL-CCNC: 122 U/L — HIGH (ref 40–120)
ALT FLD-CCNC: 20 U/L — SIGNIFICANT CHANGE UP (ref 10–45)
ANION GAP SERPL CALC-SCNC: 10 MMOL/L — SIGNIFICANT CHANGE UP (ref 5–17)
AST SERPL-CCNC: 45 U/L — HIGH (ref 10–40)
BASOPHILS # BLD AUTO: 0.02 K/UL — SIGNIFICANT CHANGE UP (ref 0–0.2)
BASOPHILS NFR BLD AUTO: 0.4 % — SIGNIFICANT CHANGE UP (ref 0–2)
BILIRUB SERPL-MCNC: 2.2 MG/DL — HIGH (ref 0.2–1.2)
BUN SERPL-MCNC: 11 MG/DL — SIGNIFICANT CHANGE UP (ref 7–23)
CALCIUM SERPL-MCNC: 8.4 MG/DL — SIGNIFICANT CHANGE UP (ref 8.4–10.5)
CHLORIDE SERPL-SCNC: 108 MMOL/L — SIGNIFICANT CHANGE UP (ref 96–108)
CO2 SERPL-SCNC: 21 MMOL/L — LOW (ref 22–31)
CREAT SERPL-MCNC: 0.62 MG/DL — SIGNIFICANT CHANGE UP (ref 0.5–1.3)
EOSINOPHIL # BLD AUTO: 0.08 K/UL — SIGNIFICANT CHANGE UP (ref 0–0.5)
EOSINOPHIL NFR BLD AUTO: 1.6 % — SIGNIFICANT CHANGE UP (ref 0–6)
FIBROSIS SCORE: 0.87
FIBROSIS STAGE: NORMAL
FIBROSURE ALPHA 2 MACROGLOBULINS: 236 MG/DL
FIBROSURE ALT (SGPT): 31 IU/L
FIBROSURE APOLIPOPROTEIN A1: 113 MG/DL
FIBROSURE COMMENT 2: NORMAL
FIBROSURE GGT: 189 IU/L
FIBROSURE HAPTOGLOBIN: 50 MG/DL
FIBROSURE INTERPRETATIONS: NORMAL
FIBROSURE LIMITATIONS: NORMAL
FIBROSURE NECROINFLAMM ACTIVITY SCORING: NORMAL
FIBROSURE NECROINFLAMMAT ACTIVITY GRPFIBROSURE NECROINFLAMMA: NORMAL
FIBROSURE NECROINFLAMMAT ACTIVITY SCORE: 0.3
FIBROSURE SCORING: NORMAL
FIBROSURE TOTAL BILIRUBIN: 2.1 MG/DL
GLUCOSE SERPL-MCNC: 84 MG/DL — SIGNIFICANT CHANGE UP (ref 70–99)
HBV E AB SER QL: NEGATIVE
HBV E AG SER QL: NEGATIVE
HCT VFR BLD CALC: 23 % — LOW (ref 34.5–45)
HGB BLD-MCNC: 7.2 G/DL — LOW (ref 11.5–15.5)
IMM GRANULOCYTES NFR BLD AUTO: 0.4 % — SIGNIFICANT CHANGE UP (ref 0–1.5)
LYMPHOCYTES # BLD AUTO: 1.17 K/UL — SIGNIFICANT CHANGE UP (ref 1–3.3)
LYMPHOCYTES # BLD AUTO: 23.1 % — SIGNIFICANT CHANGE UP (ref 13–44)
MAGNESIUM SERPL-MCNC: 1.6 MG/DL — SIGNIFICANT CHANGE UP (ref 1.6–2.6)
MCHC RBC-ENTMCNC: 27.6 PG — SIGNIFICANT CHANGE UP (ref 27–34)
MCHC RBC-ENTMCNC: 31.3 GM/DL — LOW (ref 32–36)
MCV RBC AUTO: 88.1 FL — SIGNIFICANT CHANGE UP (ref 80–100)
MONOCYTES # BLD AUTO: 0.67 K/UL — SIGNIFICANT CHANGE UP (ref 0–0.9)
MONOCYTES NFR BLD AUTO: 13.2 % — SIGNIFICANT CHANGE UP (ref 2–14)
NEUTROPHILS # BLD AUTO: 3.11 K/UL — SIGNIFICANT CHANGE UP (ref 1.8–7.4)
NEUTROPHILS NFR BLD AUTO: 61.3 % — SIGNIFICANT CHANGE UP (ref 43–77)
NRBC # BLD: 0 /100 WBCS — SIGNIFICANT CHANGE UP (ref 0–0)
PHOSPHATE SERPL-MCNC: 3 MG/DL — SIGNIFICANT CHANGE UP (ref 2.5–4.5)
PLATELET # BLD AUTO: 125 K/UL — LOW (ref 150–400)
POTASSIUM SERPL-MCNC: 3.6 MMOL/L — SIGNIFICANT CHANGE UP (ref 3.5–5.3)
POTASSIUM SERPL-SCNC: 3.6 MMOL/L — SIGNIFICANT CHANGE UP (ref 3.5–5.3)
PROT SERPL-MCNC: 4.9 G/DL — LOW (ref 6–8.3)
RBC # BLD: 2.61 M/UL — LOW (ref 3.8–5.2)
RBC # FLD: 17.5 % — HIGH (ref 10.3–14.5)
SODIUM SERPL-SCNC: 139 MMOL/L — SIGNIFICANT CHANGE UP (ref 135–145)
WBC # BLD: 5.07 K/UL — SIGNIFICANT CHANGE UP (ref 3.8–10.5)
WBC # FLD AUTO: 5.07 K/UL — SIGNIFICANT CHANGE UP (ref 3.8–10.5)

## 2020-07-23 PROCEDURE — 88305 TISSUE EXAM BY PATHOLOGIST: CPT | Mod: 26

## 2020-07-23 PROCEDURE — 99233 SBSQ HOSP IP/OBS HIGH 50: CPT | Mod: GC

## 2020-07-23 PROCEDURE — 88112 CYTOPATH CELL ENHANCE TECH: CPT | Mod: 26

## 2020-07-23 RX ORDER — OXYCODONE AND ACETAMINOPHEN 5; 325 MG/1; MG/1
1 TABLET ORAL ONCE
Refills: 0 | Status: DISCONTINUED | OUTPATIENT
Start: 2020-07-23 | End: 2020-07-23

## 2020-07-23 RX ORDER — LANOLIN ALCOHOL/MO/W.PET/CERES
3 CREAM (GRAM) TOPICAL AT BEDTIME
Refills: 0 | Status: DISCONTINUED | OUTPATIENT
Start: 2020-07-23 | End: 2020-07-24

## 2020-07-23 RX ORDER — SENNA PLUS 8.6 MG/1
2 TABLET ORAL AT BEDTIME
Refills: 0 | Status: DISCONTINUED | OUTPATIENT
Start: 2020-07-23 | End: 2020-07-24

## 2020-07-23 RX ORDER — ALBUMIN HUMAN 25 %
75 VIAL (ML) INTRAVENOUS ONCE
Refills: 0 | Status: COMPLETED | OUTPATIENT
Start: 2020-07-23 | End: 2020-07-23

## 2020-07-23 RX ORDER — ACETAMINOPHEN 500 MG
650 TABLET ORAL EVERY 6 HOURS
Refills: 0 | Status: DISCONTINUED | OUTPATIENT
Start: 2020-07-23 | End: 2020-07-24

## 2020-07-23 RX ORDER — POLYETHYLENE GLYCOL 3350 17 G/17G
17 POWDER, FOR SOLUTION ORAL DAILY
Refills: 0 | Status: DISCONTINUED | OUTPATIENT
Start: 2020-07-23 | End: 2020-07-24

## 2020-07-23 RX ORDER — KETOROLAC TROMETHAMINE 30 MG/ML
15 SYRINGE (ML) INJECTION ONCE
Refills: 0 | Status: DISCONTINUED | OUTPATIENT
Start: 2020-07-23 | End: 2020-07-23

## 2020-07-23 RX ADMIN — Medication 1 MILLIGRAM(S): at 12:18

## 2020-07-23 RX ADMIN — Medication 15 MILLIGRAM(S): at 00:54

## 2020-07-23 RX ADMIN — PANTOPRAZOLE SODIUM 40 MILLIGRAM(S): 20 TABLET, DELAYED RELEASE ORAL at 06:46

## 2020-07-23 RX ADMIN — OXYCODONE AND ACETAMINOPHEN 1 TABLET(S): 5; 325 TABLET ORAL at 22:18

## 2020-07-23 RX ADMIN — Medication 100 MILLIGRAM(S): at 12:18

## 2020-07-23 RX ADMIN — Medication 1 TABLET(S): at 12:18

## 2020-07-23 RX ADMIN — Medication 50 GRAM(S): at 12:53

## 2020-07-23 RX ADMIN — CEFTRIAXONE 100 MILLIGRAM(S): 500 INJECTION, POWDER, FOR SOLUTION INTRAMUSCULAR; INTRAVENOUS at 06:46

## 2020-07-23 RX ADMIN — OXYCODONE AND ACETAMINOPHEN 1 TABLET(S): 5; 325 TABLET ORAL at 22:45

## 2020-07-23 RX ADMIN — SENNA PLUS 2 TABLET(S): 8.6 TABLET ORAL at 22:18

## 2020-07-23 RX ADMIN — Medication 3 MILLIGRAM(S): at 22:18

## 2020-07-23 NOTE — PROGRESS NOTE ADULT - PROBLEM SELECTOR PLAN 5
Pt with hx of GI bleed with melena and hematemesis, including prior admission in March 2020 and last episode in June 2020. Currently without any hematemesis or melena. Hb 8.1 on admission (baseline 8s-9s during prior hospitalization).  - c/w protonix 40mg po qd  - continue to monitor with daily CBC and signs of bleeding  - keep active T/S. Hb 8.1 on admission (baseline 8s-9s during prior hospitalization). Normocytic. Currently no signs of active bleed  - continue to monitor Hb daily  - transfuse for Hb<7  - maintain active T/S.

## 2020-07-23 NOTE — PROGRESS NOTE ADULT - SUBJECTIVE AND OBJECTIVE BOX
INTERVAL HPI/OVERNIGHT EVENTS:    SUBJECTIVE:   Patient seen and examined at bedside.    OBJECTIVE:    VITAL SIGNS:  ICU Vital Signs Last 24 Hrs  T(C): 36.8 (2020 05:57), Max: 38 (2020 16:49)  T(F): 98.2 (2020 05:57), Max: 100.4 (2020 16:49)  HR: 90 (2020 05:57) (90 - 111)  BP: 100/65 (2020 05:57) (92/61 - 112/73)  BP(mean): --  ABP: --  ABP(mean): --  RR: 19 (2020 05:57) (18 - 19)  SpO2: 97% (2020 05:57) (95% - 98%)        CAPILLARY BLOOD GLUCOSE          PHYSICAL EXAM:    General: NAD  HEENT: NC/AT; PERRL, clear conjunctiva  Neck: Supple.  Respiratory: CTA b/l. No wheezes, rhonchi, rales.  Cardiovascular: +S1/S2; RRR  Abdomen: soft, NT/ND; +BS x4  Extremities: WWP, 2+ peripheral pulses b/l; no LE edema  Skin: normal color and turgor; no rash  Neurological:     MEDICATIONS:  MEDICATIONS  (STANDING):  cefTRIAXone   IVPB 2000 milliGRAM(s) IV Intermittent every 24 hours  folic acid 1 milliGRAM(s) Oral daily  multivitamin 1 Tablet(s) Oral daily  pantoprazole    Tablet 40 milliGRAM(s) Oral before breakfast  thiamine 100 milliGRAM(s) Oral daily    MEDICATIONS  (PRN):  acetaminophen   Tablet .. 650 milliGRAM(s) Oral every 6 hours PRN Mild Pain (1 - 3), Moderate Pain (4 - 6)  bisacodyl 5 milliGRAM(s) Oral every 12 hours PRN Constipation  oxycodone    5 mG/acetaminophen 325 mG 1 Tablet(s) Oral once PRN Severe Pain (7 - 10)      ALLERGIES:  Allergies    Cipro (Hives; Urticaria)    Intolerances        LABS:                        7.5    7.17  )-----------( 141      ( 2020 11:25 )             23.7     07-22    137  |  106  |  9   ----------------------------<  87  3.9   |  21<L>  |  0.68    Ca    8.2<L>      2020 11:25    TPro  5.1<L>  /  Alb  2.9<L>  /  TBili  2.9<H>  /  DBili  x   /  AST  55<H>  /  ALT  22  /  AlkPhos  123<H>  07-22    PT/INR - ( 2020 11:25 )   PT: 15.6 sec;   INR: 1.32          PTT - ( 2020 11:25 )  PTT:32.9 sec  Urinalysis Basic - ( 2020 23:39 )    Color: Yellow / Appearance: Clear / S.025 / pH: x  Gluc: x / Ketone: NEGATIVE  / Bili: Negative / Urobili: 1.0 E.U./dL   Blood: x / Protein: NEGATIVE mg/dL / Nitrite: NEGATIVE   Leuk Esterase: NEGATIVE / RBC: < 5 /HPF / WBC < 5 /HPF   Sq Epi: x / Non Sq Epi: 0-5 /HPF / Bacteria: Present /HPF        RADIOLOGY & ADDITIONAL TESTS: Reviewed. INTERVAL HPI/OVERNIGHT EVENTS:  No acute overnight events.    SUBJECTIVE:   Patient seen and examined at bedside this morning. No new complaints at this time. Patient states feeling much better after paracentesis. She states that his abdominal pain has improved and she has more of an appetite at this time. She otherwise denies any fevers, chills, SOB, cough, chest pain, abd pain, dysuria, changes in BM.     OBJECTIVE:    VITAL SIGNS:  ICU Vital Signs Last 24 Hrs  T(C): 36.8 (2020 05:57), Max: 38 (2020 16:49)  T(F): 98.2 (2020 05:57), Max: 100.4 (2020 16:49)  HR: 90 (2020 05:57) (90 - 111)  BP: 100/65 (2020 05:57) (92/61 - 112/73)  RR: 19 (2020 05:57) (18 - 19)  SpO2: 97% (2020 05:57) (95% - 98%)        CAPILLARY BLOOD GLUCOSE          PHYSICAL EXAM:    GENERAL: In NAD. Lying in bed comfortably.  HEENT: NC/AT. PERRL. EOMI. Neck supple. No JVD. No thyromegaly. No jaundice  CV: RRR. +S1/S2. No murmurs, rubs or gallops  LUNGS: Clear to auscultation b/l. No wheezing, rales or rhonchi.  ABDOMEN: Soft, mildly distended (improved from day prior), and mildly tender to palpation in all 4 quadrants. +BS. No guarding or rebound tenderness.  EXT: WWP. No edema in b/l extremities  VASCULAR: 2+ radial, DP bilaterally  NEURO: AAOx3. No asterixis    MEDICATIONS:  MEDICATIONS  (STANDING):  cefTRIAXone   IVPB 2000 milliGRAM(s) IV Intermittent every 24 hours  folic acid 1 milliGRAM(s) Oral daily  multivitamin 1 Tablet(s) Oral daily  pantoprazole    Tablet 40 milliGRAM(s) Oral before breakfast  thiamine 100 milliGRAM(s) Oral daily    MEDICATIONS  (PRN):  acetaminophen   Tablet .. 650 milliGRAM(s) Oral every 6 hours PRN Mild Pain (1 - 3), Moderate Pain (4 - 6)  bisacodyl 5 milliGRAM(s) Oral every 12 hours PRN Constipation  oxycodone    5 mG/acetaminophen 325 mG 1 Tablet(s) Oral once PRN Severe Pain (7 - 10)      ALLERGIES:  Allergies    Cipro (Hives; Urticaria)    Intolerances        LABS:                        7.5    7.17  )-----------( 141      ( 2020 11:25 )             23.7     07    137  |  106  |  9   ----------------------------<  87  3.9   |  21<L>  |  0.68    Ca    8.2<L>      2020 11:25    TPro  5.1<L>  /  Alb  2.9<L>  /  TBili  2.9<H>  /  DBili  x   /  AST  55<H>  /  ALT  22  /  AlkPhos  123<H>      PT/INR - ( 2020 11:25 )   PT: 15.6 sec;   INR: 1.32          PTT - ( 2020 11:25 )  PTT:32.9 sec  Urinalysis Basic - ( 2020 23:39 )    Color: Yellow / Appearance: Clear / S.025 / pH: x  Gluc: x / Ketone: NEGATIVE  / Bili: Negative / Urobili: 1.0 E.U./dL   Blood: x / Protein: NEGATIVE mg/dL / Nitrite: NEGATIVE   Leuk Esterase: NEGATIVE / RBC: < 5 /HPF / WBC < 5 /HPF   Sq Epi: x / Non Sq Epi: 0-5 /HPF / Bacteria: Present /HPF        RADIOLOGY & ADDITIONAL TESTS: Reviewed.

## 2020-07-23 NOTE — PROGRESS NOTE ADULT - SUBJECTIVE AND OBJECTIVE BOX
GASTROENTEROLOGY PROGRESS NOTE  Patient seen and examined at bedside. No acute events o/n. Patient w/ mild abd tenderness, distension improved.     PERTINENT REVIEW OF SYSTEMS:  CONSTITUTIONAL: No weakness, fevers or chills  HEENT: No visual changes; No vertigo or throat pain   GASTROINTESTINAL: As above.  NEUROLOGICAL: No numbness or weakness  SKIN: No itching, burning, rashes, or lesions     Allergies    Cipro (Hives; Urticaria)    Intolerances      MEDICATIONS:  MEDICATIONS  (STANDING):  cefTRIAXone   IVPB 2000 milliGRAM(s) IV Intermittent every 24 hours  folic acid 1 milliGRAM(s) Oral daily  multivitamin 1 Tablet(s) Oral daily  pantoprazole    Tablet 40 milliGRAM(s) Oral before breakfast  thiamine 100 milliGRAM(s) Oral daily    MEDICATIONS  (PRN):  acetaminophen   Tablet .. 650 milliGRAM(s) Oral every 6 hours PRN Mild Pain (1 - 3), Moderate Pain (4 - 6)  bisacodyl 5 milliGRAM(s) Oral every 12 hours PRN Constipation  oxycodone    5 mG/acetaminophen 325 mG 1 Tablet(s) Oral once PRN Severe Pain (7 - 10)    Vital Signs Last 24 Hrs  T(C): 36.9 (2020 09:16), Max: 38 (2020 16:49)  T(F): 98.5 (2020 09:16), Max: 100.4 (2020 16:49)  HR: 89 (2020 09:16) (89 - 111)  BP: 103/66 (2020 09:16) (92/61 - 112/73)  BP(mean): --  RR: 18 (2020 09:16) (18 - 19)  SpO2: 98% (2020 09:16) (95% - 98%)    PHYSICAL EXAM:    General: Well developed; well nourished; in no acute distress  HEENT: MMM, conjunctiva and sclera clear  Gastrointestinal: Soft mildly tender, minimally distended; Normal bowel sounds; No hepatosplenomegaly. No rebound or guarding  Skin: Warm and dry. No obvious rash    LABS:                        7.2    5.07  )-----------( 125      ( 2020 09:05 )             23.0     07-22    137  |  106  |  9   ----------------------------<  87  3.9   |  21<L>  |  0.68    Ca    8.2<L>      2020 11:25    TPro  5.1<L>  /  Alb  2.9<L>  /  TBili  2.9<H>  /  DBili  x   /  AST  55<H>  /  ALT  22  /  AlkPhos  123<H>  07-22    PT/INR - ( 2020 11:25 )   PT: 15.6 sec;   INR: 1.32          PTT - ( 2020 11:25 )  PTT:32.9 sec      Urinalysis Basic - ( 2020 23:39 )    Color: Yellow / Appearance: Clear / S.025 / pH: x  Gluc: x / Ketone: NEGATIVE  / Bili: Negative / Urobili: 1.0 E.U./dL   Blood: x / Protein: NEGATIVE mg/dL / Nitrite: NEGATIVE   Leuk Esterase: NEGATIVE / RBC: < 5 /HPF / WBC < 5 /HPF   Sq Epi: x / Non Sq Epi: 0-5 /HPF / Bacteria: Present /HPF                Culture - Body Fluid with Gram Stain (collected 2020 16:54)  Source: Peritoneal peritoneal fluid  Gram Stain (2020 18:47):    No organisms seen    Few WBC's    Culture - Blood (collected 2020 23:50)  Source: .Blood Blood-Peripheral  Preliminary Report (2020 01:01):    No growth to date.    Culture - Blood (collected 2020 23:50)  Source: .Blood Blood-Peripheral  Preliminary Report (2020 01:01):    No growth to date.      RADIOLOGY & ADDITIONAL STUDIES:  Reviewed

## 2020-07-23 NOTE — PROGRESS NOTE ADULT - PROBLEM SELECTOR PLAN 1
Pt with hx of alcoholic cirrhosis, s/p paracentesis about 3 weeks ago at OSH ([facility] in [facility]). She presents with fever 103.1F, leukocytosis, tachycardia , meeting SIRS criteria. sBP ranges from  (previously around sBP 100s during last hospitalization). She received total 2.65L NS bolus in the ED. Lactate 1.9. She also was given IV CTX 1g for SBP ppx. Given another 1g IV CTX while here at [facility] and ordered for IV CTX 2g qd for SBP.  - c/w IV CTX 2g qd for SBP coverage  - holding home propranolol given increased mortality if pt with SBP  - pending IR paracentesis in the AM  - AM GI consult  - f/u BCx. Pt with hx of alcoholic cirrhosis, s/p paracentesis about 3 weeks ago at OSH ([facility] in [facility]). She presents with fever 103.1F, leukocytosis, tachycardia , meeting SIRS criteria. sBP ranges from  (previously around sBP 100s during last hospitalization). She received total 2.65L NS bolus in the ED. Lactate 1.9. She also was given IV CTX 1g for SBP ppx. Given another 1g IV CTX while here at [facility] and ordered for IV CTX 2g qd for SBP.  - c/w IV CTX 2g qd for SBP coverage to complete 5 day course, then switch to Bactrim DS QD  - holding home propranolol given increased mortality if pt with SBP  - s/p IR-guided paracentesis (7/23) with SAAG >1.1  - per GI recs, patient switched to DASH diet and added albumin 1.5g/kg, switch to 1g/kg after 48 hrs   - f/u BCx

## 2020-07-23 NOTE — PROGRESS NOTE ADULT - PROBLEM SELECTOR PLAN 9
F: none  E: replete K<4, Mg<2  N: NPO for now pending IR procedure  VTE Prophylaxis: SCDs for now while DVT ppx held for IR procedure  C: Full Code  D: [facility].

## 2020-07-23 NOTE — PROGRESS NOTE ADULT - PROBLEM SELECTOR PLAN 7
Hb 8.1 on admission (baseline 8s-9s during prior hospitalization). Normocytic. Currently no signs of active bleed  - continue to monitor Hb daily  - transfuse for Hb<7  - maintain active T/S. F: none  E: replete K<4, Mg<2  N: NPO for now pending IR procedure  VTE Prophylaxis: SCDs for now while DVT ppx held for IR procedure  C: Full Code  D: [facility].

## 2020-07-23 NOTE — PROGRESS NOTE ADULT - PROBLEM SELECTOR PLAN 3
Hx of alcoholic cirrhosis with prior ascites. Pt presented with 2-3 days of increasing abdominal pain with distention. Bedside ultrasound of abdomen performed at [facility] ED showed mild ascites. Differential dx includes 2/2 SBP vs alcoholic cirrhosis.  - plan for IR-guided paracentesis in the morning. Hx of alcoholic cirrhosis with prior ascites. Follows Dr. Gissell lay and is prescribed lasix 20mg qd, aldactone 50mg qd. Abd US performed 7/22 showing hepatic cirrhosis with mild ascites in RLQ pocket. Liver size 17.6 cm.  - MELD-Na score of 15 (6% 3-month mortality)  - on lasix 20 mg, aldactone 50 mg daily at home  - currently holding lasix and aldactone given low sBP  - f/u daily MELD labs (CMP, coags)

## 2020-07-23 NOTE — PROGRESS NOTE ADULT - PROBLEM SELECTOR PLAN 4
Hx of alcoholic cirrhosis with prior ascites. Follows Dr. Gissell lay and is prescribed lasix 20mg qd, aldactone 50mg qd  - MELD-Na score of 15 (6% 3-month mortality)  - on lasix 20 mg, aldactone 50 mg daily at home  - currently holding lasix and aldactone given low sBP  - f/u daily MELD labs (CMP, coags)  - AM GI consult. Pt with hx of GI bleed with melena and hematemesis, including prior admission in March 2020 and last episode in June 2020. Currently without any hematemesis or melena. Hb 8.1 on admission (baseline 8s-9s during prior hospitalization).  - c/w protonix 40mg po qd  - continue to monitor with daily CBC and signs of bleeding  - keep active T/S

## 2020-07-23 NOTE — PROGRESS NOTE ADULT - ASSESSMENT
60F w/ a PMH significant for EtOH abuse, alcoholic cirrhosis, anemia, prior upper GI bleeds, who presents with fevers and increasing abdominal distention associated with pain and generalized weakness x3 days.    #Cirrhosis w/ presumed SBP - MELD-Na: 16  PSE: none appreciated  Ascites: moderate, s/p paracentesis w/ 2L removed  HCC: none seen on US  Varices: seen on EGD at OSH in 6/2020  - please obtain records from OSH in [facility]  - continue ceftriaxone for a total of 5 days  - based on AASLD guidelines, would consider 1.5 g/kg albumin now and then 1 g/kg after 48 hours as it has shown some benefit  - low salt diet  - hold diuretics for now, will re-consider pending today's CMP  - ascites fluid studies consistent w/ SBP: SAAG 2.3, nucleated cells 6817 w/ 87% PMNs, and only 1000 RBCs  - will require lifelong abx SBP ppx with bactrim one DS tablet daily given cipro allergy  - daily MELD labs: CMP, INR    Clyde Sparks MD  PGY-4, Gastroenterology Fellow  pager: [phone]

## 2020-07-23 NOTE — PROGRESS NOTE ADULT - ATTENDING COMMENTS
60F w/ a PMH significant for EtOH abuse, alcoholic cirrhosis, anemia, prior upper GI bleeds, who presents with fevers and increasing abdominal distention. Admitted for r/o SBP.    PE: improvement of abdominal distention, still with mild diffuse abdominal tenderness however improved from prior. Otherwise, patient awake alert NAD. No other remarkable findings.   -SBP: Patient with elevated PMNs in ascitic fluid, consistent with SBP. Currently on CTX 2g, will continue for a total of five days. Blood cx with NGTD  -Cirrhosis: Saag >2.2, consistent with cirrhosis. GI following, now s/p paracentesis. Will give albumin per their recs. Continue to hold diuretics and will restart as tolerated.

## 2020-07-23 NOTE — PROGRESS NOTE ADULT - PROBLEM SELECTOR PLAN 6
seen on initial EKG, however poor quality, will repeat, followup QTc. Hx of alcohol use. Last drink 6/24/2020.  -c/w home thiamine, folic acid and multivitamin daily.

## 2020-07-24 ENCOUNTER — TRANSCRIPTION ENCOUNTER (OUTPATIENT)
Age: 60
End: 2020-07-24

## 2020-07-24 VITALS
RESPIRATION RATE: 16 BRPM | OXYGEN SATURATION: 96 % | SYSTOLIC BLOOD PRESSURE: 111 MMHG | DIASTOLIC BLOOD PRESSURE: 75 MMHG | TEMPERATURE: 98 F | HEART RATE: 104 BPM

## 2020-07-24 PROBLEM — K74.60 UNSPECIFIED CIRRHOSIS OF LIVER: Chronic | Status: ACTIVE | Noted: 2020-07-21

## 2020-07-24 LAB
ALBUMIN SERPL ELPH-MCNC: 3.3 G/DL — SIGNIFICANT CHANGE UP (ref 3.3–5)
ALP SERPL-CCNC: 110 U/L — SIGNIFICANT CHANGE UP (ref 40–120)
ALT FLD-CCNC: 16 U/L — SIGNIFICANT CHANGE UP (ref 10–45)
ANION GAP SERPL CALC-SCNC: 11 MMOL/L — SIGNIFICANT CHANGE UP (ref 5–17)
APTT BLD: 34.6 SEC — SIGNIFICANT CHANGE UP (ref 27.5–35.5)
AST SERPL-CCNC: 39 U/L — SIGNIFICANT CHANGE UP (ref 10–40)
BILIRUB SERPL-MCNC: 1.7 MG/DL — HIGH (ref 0.2–1.2)
BUN SERPL-MCNC: 9 MG/DL — SIGNIFICANT CHANGE UP (ref 7–23)
CALCIUM SERPL-MCNC: 8.7 MG/DL — SIGNIFICANT CHANGE UP (ref 8.4–10.5)
CHLORIDE SERPL-SCNC: 109 MMOL/L — HIGH (ref 96–108)
CO2 SERPL-SCNC: 21 MMOL/L — LOW (ref 22–31)
CREAT SERPL-MCNC: 0.59 MG/DL — SIGNIFICANT CHANGE UP (ref 0.5–1.3)
GLUCOSE SERPL-MCNC: 84 MG/DL — SIGNIFICANT CHANGE UP (ref 70–99)
HCT VFR BLD CALC: 23.9 % — LOW (ref 34.5–45)
HGB BLD-MCNC: 7.3 G/DL — LOW (ref 11.5–15.5)
INR BLD: 1.31 — HIGH (ref 0.88–1.16)
MAGNESIUM SERPL-MCNC: 1.5 MG/DL — LOW (ref 1.6–2.6)
MCHC RBC-ENTMCNC: 27.8 PG — SIGNIFICANT CHANGE UP (ref 27–34)
MCHC RBC-ENTMCNC: 30.5 GM/DL — LOW (ref 32–36)
MCV RBC AUTO: 90.9 FL — SIGNIFICANT CHANGE UP (ref 80–100)
NRBC # BLD: 0 /100 WBCS — SIGNIFICANT CHANGE UP (ref 0–0)
PHOSPHATE SERPL-MCNC: 3.3 MG/DL — SIGNIFICANT CHANGE UP (ref 2.5–4.5)
PLATELET # BLD AUTO: 137 K/UL — LOW (ref 150–400)
POTASSIUM SERPL-MCNC: 3.7 MMOL/L — SIGNIFICANT CHANGE UP (ref 3.5–5.3)
POTASSIUM SERPL-SCNC: 3.7 MMOL/L — SIGNIFICANT CHANGE UP (ref 3.5–5.3)
PROT SERPL-MCNC: 5.2 G/DL — LOW (ref 6–8.3)
PROTHROM AB SERPL-ACNC: 15.5 SEC — HIGH (ref 10.6–13.6)
RBC # BLD: 2.63 M/UL — LOW (ref 3.8–5.2)
RBC # FLD: 17.3 % — HIGH (ref 10.3–14.5)
SODIUM SERPL-SCNC: 141 MMOL/L — SIGNIFICANT CHANGE UP (ref 135–145)
WBC # BLD: 4.41 K/UL — SIGNIFICANT CHANGE UP (ref 3.8–10.5)
WBC # FLD AUTO: 4.41 K/UL — SIGNIFICANT CHANGE UP (ref 3.8–10.5)

## 2020-07-24 PROCEDURE — 85610 PROTHROMBIN TIME: CPT

## 2020-07-24 PROCEDURE — 86901 BLOOD TYPING SEROLOGIC RH(D): CPT

## 2020-07-24 PROCEDURE — 87040 BLOOD CULTURE FOR BACTERIA: CPT

## 2020-07-24 PROCEDURE — 84100 ASSAY OF PHOSPHORUS: CPT

## 2020-07-24 PROCEDURE — 89051 BODY FLUID CELL COUNT: CPT

## 2020-07-24 PROCEDURE — 96365 THER/PROPH/DIAG IV INF INIT: CPT

## 2020-07-24 PROCEDURE — P9047: CPT

## 2020-07-24 PROCEDURE — 81001 URINALYSIS AUTO W/SCOPE: CPT

## 2020-07-24 PROCEDURE — 49083 ABD PARACENTESIS W/IMAGING: CPT

## 2020-07-24 PROCEDURE — C1769: CPT

## 2020-07-24 PROCEDURE — 83605 ASSAY OF LACTIC ACID: CPT

## 2020-07-24 PROCEDURE — 83615 LACTATE (LD) (LDH) ENZYME: CPT

## 2020-07-24 PROCEDURE — 85730 THROMBOPLASTIN TIME PARTIAL: CPT

## 2020-07-24 PROCEDURE — 93005 ELECTROCARDIOGRAM TRACING: CPT

## 2020-07-24 PROCEDURE — 84157 ASSAY OF PROTEIN OTHER: CPT

## 2020-07-24 PROCEDURE — 80307 DRUG TEST PRSMV CHEM ANLYZR: CPT

## 2020-07-24 PROCEDURE — 36415 COLL VENOUS BLD VENIPUNCTURE: CPT

## 2020-07-24 PROCEDURE — 83735 ASSAY OF MAGNESIUM: CPT

## 2020-07-24 PROCEDURE — 88305 TISSUE EXAM BY PATHOLOGIST: CPT

## 2020-07-24 PROCEDURE — 86850 RBC ANTIBODY SCREEN: CPT

## 2020-07-24 PROCEDURE — 85027 COMPLETE CBC AUTOMATED: CPT

## 2020-07-24 PROCEDURE — C1729: CPT

## 2020-07-24 PROCEDURE — 87205 SMEAR GRAM STAIN: CPT

## 2020-07-24 PROCEDURE — 96375 TX/PRO/DX INJ NEW DRUG ADDON: CPT

## 2020-07-24 PROCEDURE — 88112 CYTOPATH CELL ENHANCE TECH: CPT

## 2020-07-24 PROCEDURE — 87075 CULTR BACTERIA EXCEPT BLOOD: CPT

## 2020-07-24 PROCEDURE — 80053 COMPREHEN METABOLIC PANEL: CPT

## 2020-07-24 PROCEDURE — U0003: CPT

## 2020-07-24 PROCEDURE — 82042 OTHER SOURCE ALBUMIN QUAN EA: CPT

## 2020-07-24 PROCEDURE — 71045 X-RAY EXAM CHEST 1 VIEW: CPT

## 2020-07-24 PROCEDURE — 76705 ECHO EXAM OF ABDOMEN: CPT

## 2020-07-24 PROCEDURE — 83690 ASSAY OF LIPASE: CPT

## 2020-07-24 PROCEDURE — 99232 SBSQ HOSP IP/OBS MODERATE 35: CPT

## 2020-07-24 PROCEDURE — 87070 CULTURE OTHR SPECIMN AEROBIC: CPT

## 2020-07-24 PROCEDURE — 99285 EMERGENCY DEPT VISIT HI MDM: CPT | Mod: 25

## 2020-07-24 PROCEDURE — 99239 HOSP IP/OBS DSCHRG MGMT >30: CPT | Mod: GC

## 2020-07-24 PROCEDURE — 85025 COMPLETE CBC W/AUTO DIFF WBC: CPT

## 2020-07-24 RX ORDER — AZTREONAM 2 G
1 VIAL (EA) INJECTION
Qty: 30 | Refills: 0
Start: 2020-07-24 | End: 2020-08-22

## 2020-07-24 RX ORDER — SPIRONOLACTONE 25 MG/1
50 TABLET, FILM COATED ORAL DAILY
Refills: 0 | Status: DISCONTINUED | OUTPATIENT
Start: 2020-07-24 | End: 2020-07-24

## 2020-07-24 RX ORDER — CEFTRIAXONE 500 MG/1
2000 INJECTION, POWDER, FOR SOLUTION INTRAMUSCULAR; INTRAVENOUS EVERY 24 HOURS
Refills: 0 | Status: DISCONTINUED | OUTPATIENT
Start: 2020-07-25 | End: 2020-07-24

## 2020-07-24 RX ORDER — MAGNESIUM SULFATE 500 MG/ML
1 VIAL (ML) INJECTION ONCE
Refills: 0 | Status: DISCONTINUED | OUTPATIENT
Start: 2020-07-24 | End: 2020-07-24

## 2020-07-24 RX ORDER — CEFPODOXIME PROXETIL 100 MG
1 TABLET ORAL
Qty: 4 | Refills: 0
Start: 2020-07-24 | End: 2020-07-25

## 2020-07-24 RX ORDER — POTASSIUM CHLORIDE 20 MEQ
20 PACKET (EA) ORAL ONCE
Refills: 0 | Status: COMPLETED | OUTPATIENT
Start: 2020-07-24 | End: 2020-07-24

## 2020-07-24 RX ORDER — MAGNESIUM SULFATE 500 MG/ML
2 VIAL (ML) INJECTION ONCE
Refills: 0 | Status: COMPLETED | OUTPATIENT
Start: 2020-07-24 | End: 2020-07-24

## 2020-07-24 RX ORDER — FUROSEMIDE 40 MG
20 TABLET ORAL DAILY
Refills: 0 | Status: DISCONTINUED | OUTPATIENT
Start: 2020-07-24 | End: 2020-07-24

## 2020-07-24 RX ADMIN — SPIRONOLACTONE 50 MILLIGRAM(S): 25 TABLET, FILM COATED ORAL at 12:52

## 2020-07-24 RX ADMIN — Medication 20 MILLIEQUIVALENT(S): at 10:05

## 2020-07-24 RX ADMIN — PANTOPRAZOLE SODIUM 40 MILLIGRAM(S): 20 TABLET, DELAYED RELEASE ORAL at 07:12

## 2020-07-24 RX ADMIN — Medication 50 GRAM(S): at 10:05

## 2020-07-24 RX ADMIN — Medication 100 MILLIGRAM(S): at 12:51

## 2020-07-24 RX ADMIN — Medication 20 MILLIGRAM(S): at 12:52

## 2020-07-24 RX ADMIN — Medication 20 MILLIEQUIVALENT(S): at 12:58

## 2020-07-24 RX ADMIN — Medication 1 MILLIGRAM(S): at 12:52

## 2020-07-24 RX ADMIN — CEFTRIAXONE 100 MILLIGRAM(S): 500 INJECTION, POWDER, FOR SOLUTION INTRAMUSCULAR; INTRAVENOUS at 04:30

## 2020-07-24 RX ADMIN — Medication 1 TABLET(S): at 12:52

## 2020-07-24 RX ADMIN — POLYETHYLENE GLYCOL 3350 17 GRAM(S): 17 POWDER, FOR SOLUTION ORAL at 12:53

## 2020-07-24 NOTE — PROGRESS NOTE ADULT - PROBLEM SELECTOR PLAN 7
F: none  E: replete K<4, Mg<2  N: NPO for now pending IR procedure  VTE Prophylaxis: SCDs for now while DVT ppx held for IR procedure  C: Full Code  D: [facility]. F: none  E: replete K<4, Mg<2  N: DASH/TLC Diet   VTE Prophylaxis: None, pt ambulating   C: Full Code  D: [facility].

## 2020-07-24 NOTE — PROGRESS NOTE ADULT - PROBLEM SELECTOR PLAN 1
Pt with hx of alcoholic cirrhosis, s/p paracentesis about 3 weeks ago at OSH ([facility] in [facility]). She presents with fever 103.1F, leukocytosis, tachycardia , meeting SIRS criteria. sBP ranges from  (previously around sBP 100s during last hospitalization). She received total 2.65L NS bolus in the ED. Lactate 1.9. She also was given IV CTX 1g for SBP ppx. Given another 1g IV CTX while here at [facility] and ordered for IV CTX 2g qd for SBP.  - c/w IV CTX 2g qd for SBP coverage to complete 5 day course, then switch to Bactrim DS QD  - holding home propranolol given increased mortality if pt with SBP  - s/p IR-guided paracentesis (7/23) with SAAG >2.2  - per GI recs, patient switched to DASH diet and added albumin 1.5g/kg, switch to 1g/kg after 48 hrs   - f/u BCx Pt with hx of alcoholic cirrhosis, s/p paracentesis about 3 weeks ago at OSH ([facility] in [facility]). She presents with fever 103.1F, leukocytosis, tachycardia , meeting SIRS criteria. sBP ranges from  (previously around sBP 100s during last hospitalization). She received total 2.65L NS bolus in the ED. Lactate 1.9. She also was given IV CTX 1g for SBP ppx. Given another 1g IV CTX while here at [facility] and ordered for IV CTX 2g qd for SBP.  - c/w IV CTX 2g qd for SBP coverage to complete 5 day course (day 5/5 on Saturday 7/26), then switch to Bactrim DS QD  - holding home propranolol given increased mortality if pt with SBP  - s/p IR-guided paracentesis (7/23) with SAAG >2.2  - per GI recs, patient switched to DASH diet and added albumin 1.5g/kg, switch to 1g/kg after 48 hrs (7/24)  - f/u BCx Pt with hx of alcoholic cirrhosis, s/p paracentesis about 3 weeks ago at OSH ([facility] in [facility]). She presents with fever 103.1F, leukocytosis, tachycardia , meeting SIRS criteria. sBP ranges from  (previously around sBP 100s during last hospitalization). She received total 2.65L NS bolus in the ED. Lactate 1.9. She also was given IV CTX 1g for SBP ppx. Given another 1g IV CTX while here at [facility] and ordered for IV CTX 2g qd for SBP.  - c/w IV CTX 2g qd for SBP coverage to complete 5 day course (currently day 3/5)  - holding home propranolol given increased mortality if pt with SBP  - s/p IR-guided paracentesis (7/23) with SAAG >2.2  - per GI recs, patient switched to DASH diet and added albumin 1.5g/kg, switch to 1g/kg after 48 hrs (7/24)  - f/u BCx

## 2020-07-24 NOTE — PROGRESS NOTE ADULT - SUBJECTIVE AND OBJECTIVE BOX
GASTROENTEROLOGY PROGRESS NOTE  Patient seen and examined at bedside. No acute events o/n. Patient unhappy that neighbor is loud o/n and she cannot sleep.     PERTINENT REVIEW OF SYSTEMS:  CONSTITUTIONAL: No weakness, fevers or chills  HEENT: No visual changes; No vertigo or throat pain   GASTROINTESTINAL: No abdominal or epigastric pain. No nausea, vomiting, or hematemesis; No diarrhea or constipation. No melena or hematochezia.  NEUROLOGICAL: No numbness or weakness  SKIN: No itching, burning, rashes, or lesions     Allergies    Cipro (Hives; Urticaria)    Intolerances      MEDICATIONS:  MEDICATIONS  (STANDING):  cefTRIAXone   IVPB 2000 milliGRAM(s) IV Intermittent every 24 hours  folic acid 1 milliGRAM(s) Oral daily  magnesium sulfate  IVPB 2 Gram(s) IV Intermittent once  melatonin 3 milliGRAM(s) Oral at bedtime  multivitamin 1 Tablet(s) Oral daily  pantoprazole    Tablet 40 milliGRAM(s) Oral before breakfast  polyethylene glycol 3350 17 Gram(s) Oral daily  potassium chloride    Tablet ER 20 milliEquivalent(s) Oral once  potassium chloride    Tablet ER 20 milliEquivalent(s) Oral once  senna 2 Tablet(s) Oral at bedtime  thiamine 100 milliGRAM(s) Oral daily    MEDICATIONS  (PRN):  acetaminophen   Tablet .. 650 milliGRAM(s) Oral every 6 hours PRN Mild Pain (1 - 3), Moderate Pain (4 - 6)  bisacodyl 5 milliGRAM(s) Oral every 12 hours PRN Constipation    Vital Signs Last 24 Hrs  T(C): 36.8 (2020 04:33), Max: 37.1 (2020 21:55)  T(F): 98.3 (2020 04:33), Max: 98.8 (2020 21:55)  HR: 81 (2020 04:33) (81 - 108)  BP: 123/67 (2020 04:33) (100/68 - 123/67)  BP(mean): --  RR: 18 (2020 04:33) (18 - 18)  SpO2: 100% (2020 04:33) (97% - 100%)    PHYSICAL EXAM:    General: Well developed; well nourished; in no acute distress  HEENT: MMM, conjunctiva and sclera clear  Gastrointestinal: Soft non-tender non-distended; Normal bowel sounds; No hepatosplenomegaly. No rebound or guarding  Skin: Warm and dry. No obvious rash    LABS:                        7.3    4.41  )-----------( 137      ( 2020 07:11 )             23.9     07-24    141  |  109<H>  |  9   ----------------------------<  84  3.7   |  21<L>  |  0.59    Ca    8.7      2020 07:11  Phos  3.3     07-24  Mg     1.5     07-24    TPro  5.2<L>  /  Alb  3.3  /  TBili  1.7<H>  /  DBili  x   /  AST  39  /  ALT  16  /  AlkPhos  110  07-24    PT/INR - ( 2020 07:11 )   PT: 15.5 sec;   INR: 1.31          PTT - ( 2020 07:11 )  PTT:34.6 sec      Urinalysis Basic - ( 2020 23:39 )    Color: Yellow / Appearance: Clear / S.025 / pH: x  Gluc: x / Ketone: NEGATIVE  / Bili: Negative / Urobili: 1.0 E.U./dL   Blood: x / Protein: NEGATIVE mg/dL / Nitrite: NEGATIVE   Leuk Esterase: NEGATIVE / RBC: < 5 /HPF / WBC < 5 /HPF   Sq Epi: x / Non Sq Epi: 0-5 /HPF / Bacteria: Present /HPF                Culture - Body Fluid with Gram Stain (collected 2020 16:54)  Source: Peritoneal peritoneal fluid  Gram Stain (2020 18:47):    No organisms seen    Few WBC's  Preliminary Report (2020 12:23):    No growth to date.    Culture - Blood (collected 2020 23:50)  Source: .Blood Blood-Peripheral  Preliminary Report (2020 01:01):    No growth to date.    Culture - Blood (collected 2020 23:50)  Source: .Blood Blood-Peripheral  Preliminary Report (2020 01:01):    No growth to date.      RADIOLOGY & ADDITIONAL STUDIES:  Reviewed

## 2020-07-24 NOTE — DISCHARGE NOTE NURSING/CASE MANAGEMENT/SOCIAL WORK - PATIENT PORTAL LINK FT
You can access the FollowMyHealth Patient Portal offered by [facility] by registering at the following website: [email or web address]. By joining [facility]’s FollowMyHealth portal, you will also be able to view your health information using other applications (apps) compatible with our system.

## 2020-07-24 NOTE — DISCHARGE NOTE PROVIDER - HOSPITAL COURSE
#Discharge: do not delete        60F [facility] EtOh abuse, alcoholic cirrhosis s/p abdominal paracentesis 3 weeks prior to admission, anemia, prior UGIB    Presented with fever and increasing abdominal distention, found to have SBP    Problem List/Main Diagnoses (system-based):     1. Spontaneous bacterial peritonitis (SBP) - Following admission, patient found to have SBP. GI consulted and patient started on 5 day course of ceftriaxone. IR contacted and therapeutic paracentesis performed. Pt given albumen after procedure. Discharged on DS bactrim for SBP ppx.        2. Alcoholic cirrhosis - Patient reports history of alcoholic cirrhosis. Follows with Dr. Wheeler as an outpatient and takes lasix 20 mg PO and aldactone 50 mg PO Qd at home. Meld score tracked and low during hospitalization. No changes to management and patient discharged to follow up with Dr. Borrero as an outpatient.         Inpatient treatment course:     New medications: Bactrim DS PO Qd for SBP ppx    Labs to be followed outpatient: none    Exam to be followed outpatient: none 60F PMH EtOh abuse, alcoholic cirrhosis s/p abdominal paracentesis 3 weeks prior to admission, anemia, prior UGIB    Presented with fever and increasing abdominal distention, found to have SBP        Problem List/Main Diagnoses (system-based):         1. Spontaneous bacterial peritonitis (SBP) - Following admission, patient found to have SBP. GI consulted and patient started on 5 day course of ceftriaxone. IR contacted and therapeutic paracentesis performed. Pt given albumin after procedure. Discharged on Cefpedoxime x1 day, DS bactrim for SBP ppx. Will f/u with GI as outpatient.         2. Alcoholic cirrhosis - Patient reports history of alcoholic cirrhosis. Follows with Dr. Wheeler as an outpatient and takes lasix 20 mg PO and aldactone 50 mg PO Qd at home. Meld score tracked and low during hospitalization. No changes to management and patient discharged to follow up with Dr. Borrero as an outpatient.         Inpatient treatment course: Ceftriaxone 2g IV, Albumin 1.5g/kg    New medications: Cefpedoxime 400 BID x1 day, Bactrim DS PO Qd for SBP ppx    Labs to be followed outpatient: none    Exam to be followed outpatient: none 60F PMH EtOh abuse, alcoholic cirrhosis s/p abdominal paracentesis 3 weeks prior to admission, anemia, prior UGIB    Presented with fever and increasing abdominal distention, found to have SBP        Problem List/Main Diagnoses (system-based):         1. Spontaneous bacterial peritonitis (SBP) - Following admission, patient found to have SBP. GI consulted and patient started on 5 day course of ceftriaxone. IR contacted and therapeutic paracentesis performed. Pt given albumin after procedure. Discharged on Cefpedoxime x1 day, DS bactrim for SBP ppx. Will f/u with GI as outpatient.         2. Alcoholic cirrhosis - Patient reports history of alcoholic cirrhosis. Follows with Dr. Wheeler as an outpatient and takes lasix 20 mg PO and aldactone 50 mg PO Qd at home. Meld score tracked and low during hospitalization. No changes to management and patient discharged to follow up with Dr. Borrero as an outpatient.         Inpatient treatment course: Ceftriaxone 2g IV, Albumin 1.5g/kg    New medications: Cefpedoxime 400 BID x2 days, Bactrim DS PO Qd for SBP ppx    Labs to be followed outpatient: none    Exam to be followed outpatient: none 60F PMH EtOh abuse, alcoholic cirrhosis s/p abdominal paracentesis 3 weeks prior to admission, anemia, prior UGIB    Presented with fever and increasing abdominal distention, found to have SBP        Problem List/Main Diagnoses (system-based):         1. Spontaneous bacterial peritonitis (SBP) - Following admission, patient found to have SBP. GI consulted and patient started on 5 day course of ceftriaxone. IR contacted and therapeutic paracentesis performed. Pt given albumin after procedure. Discharged on Cefpedoxime x1 day, DS bactrim for SBP ppx. Will f/u with GI as outpatient.         2. Alcoholic cirrhosis - Patient reports history of alcoholic cirrhosis. Follows with Dr. Wheeler as an outpatient and takes lasix 20 mg PO and aldactone 50 mg PO Qd at home. Meld score tracked and low during hospitalization. No changes to management and patient discharged to follow up with Dr. Borrero as an outpatient.         Inpatient treatment course: Ceftriaxone 2g IV, Albumin 1.5g/kg    New medications: Cefpedoxime 200 BID x2 days, Bactrim DS PO Qd for SBP ppx    Labs to be followed outpatient: none    Exam to be followed outpatient: none

## 2020-07-24 NOTE — PROGRESS NOTE ADULT - PROBLEM SELECTOR PLAN 2
Hx of alcoholic cirrhosis with prior ascites. Pt presented with 2-3 days of increasing abdominal pain with distention. Bedside ultrasound of abdomen performed at [facility] ED showed mild ascites. Differential dx includes 2/2 SBP vs alcoholic cirrhosis.  - s/p IR-guided paracentesis on 7/22 with removal of approximately 2 L fluid  - SAAG >2.2   - Mgmt otherwise as stated above
Hx of alcoholic cirrhosis with prior ascites. Pt presented with 2-3 days of increasing abdominal pain with distention. Bedside ultrasound of abdomen performed at [facility] ED showed mild ascites. Differential dx includes 2/2 SBP vs alcoholic cirrhosis.  - s/p IR-guided paracentesis on 7/22 with removal of approximately 2 L fluid

## 2020-07-24 NOTE — DISCHARGE NOTE PROVIDER - NSDCCPCAREPLAN_GEN_ALL_CORE_FT
PRINCIPAL DISCHARGE DIAGNOSIS  Diagnosis: SBP (spontaneous bacterial peritonitis)  Assessment and Plan of Treatment:       SECONDARY DISCHARGE DIAGNOSES  Diagnosis: Ascites due to alcoholic cirrhosis  Assessment and Plan of Treatment: PRINCIPAL DISCHARGE DIAGNOSIS  Diagnosis: SBP (spontaneous bacterial peritonitis)  Assessment and Plan of Treatment: Following your admission to the hospital you were found to have spontaneous bacterial peritonitis or SBP. In this condition, fluid in your abdomen becomes infected, which resulted in the symptoms you experienced prior to admission. Your healthcare problems regarding your liver predisposed you to this condition. This was treated with IV antibiotics, and a drainage of the fluid. Following your discharge, it is essential you continue to take the prescribed antibiotic, called bactrim, to prevent this condition from returning. In addition, an appointment has been made for you with Dr. Gibbs, the gastroenterologist who saw you in the hospital. Please keep this appointment for ongoing evaluation and treatment of this condition.  If following your discharge, you begin to have a fever, or develop worsening collections of fluid in your abdomen, please call 911 or return to the emergency room, as this may indicate a return of the infection. As mentioned above, please keep your appointment with Dr. Gibbs and take all medications as prescribed. If you have any questions, please call the number provided at the end of this document to be connected with a healthcare profesional.

## 2020-07-24 NOTE — DISCHARGE NOTE PROVIDER - CARE PROVIDERS DIRECT ADDRESSES
[email or web address]

## 2020-07-24 NOTE — PROGRESS NOTE ADULT - PROBLEM SELECTOR PLAN 5
Hb 8.1 on admission (baseline 8s-9s during prior hospitalization). Normocytic. Currently no signs of active bleed  - continue to monitor Hb daily  - transfuse for Hb<7  - maintain active T/S.

## 2020-07-24 NOTE — DISCHARGE NOTE PROVIDER - NSDCMRMEDTOKEN_GEN_ALL_CORE_FT
folic acid 1 mg oral tablet: 1 tab(s) orally once a day   Lasix 20 mg oral tablet: 1 tab(s) orally once a day  Multiple Vitamins oral tablet: 1 tab(s) orally once a day   pantoprazole 40 mg oral delayed release tablet: 1 tab(s) orally once a day  propranolol 20 mg oral tablet: 1 tab(s) orally 2 times a day   spironolactone 50 mg oral tablet: 1 tab(s) orally once a day  thiamine 100 mg oral tablet: 1 tab(s) orally once a day cefpodoxime 200 mg oral tablet: 1 tab(s) orally 2 times a day   folic acid 1 mg oral tablet: 1 tab(s) orally once a day   Lasix 20 mg oral tablet: 1 tab(s) orally once a day  Multiple Vitamins oral tablet: 1 tab(s) orally once a day   pantoprazole 40 mg oral delayed release tablet: 1 tab(s) orally once a day  spironolactone 50 mg oral tablet: 1 tab(s) orally once a day  thiamine 100 mg oral tablet: 1 tab(s) orally once a day Bactrim  mg-160 mg oral tablet: 1 tab(s) orally once a day   cefpodoxime 200 mg oral tablet: 1 tab(s) orally 2 times a day   folic acid 1 mg oral tablet: 1 tab(s) orally once a day   Lasix 20 mg oral tablet: 1 tab(s) orally once a day  Multiple Vitamins oral tablet: 1 tab(s) orally once a day   pantoprazole 40 mg oral delayed release tablet: 1 tab(s) orally once a day  spironolactone 50 mg oral tablet: 1 tab(s) orally once a day  thiamine 100 mg oral tablet: 1 tab(s) orally once a day

## 2020-07-24 NOTE — DISCHARGE NOTE PROVIDER - NSDCFUSCHEDAPPT_GEN_ALL_CORE_FT
TERE CUEVAS ; 08/25/2020 ; [facility] [address] TERE CUEVAS ; 08/25/2020 ; [facility] [address] TERE CUEVAS ; 08/25/2020 ; [facility] [address] TERE CUEVAS ; 08/25/2020 ; [facility] [address] TERE CUEVAS ; 08/25/2020 ; [facility] [address]

## 2020-07-24 NOTE — DISCHARGE NOTE PROVIDER - NSDCFUADDAPPT_GEN_ALL_CORE_FT
In addition to Dr. Gibbs, please follow up with Dr. Borrero with whom you follow regarding your liver.

## 2020-07-24 NOTE — PROGRESS NOTE ADULT - PROBLEM SELECTOR PLAN 3
Hx of alcoholic cirrhosis with prior ascites. Follows Dr. Gissell lay and is prescribed lasix 20mg qd, aldactone 50mg qd. Abd US performed 7/22 showing hepatic cirrhosis with mild ascites in RLQ pocket. Liver size 17.6 cm.  - MELD-Na score of 15 (6% 3-month mortality)  - on lasix 20 mg, aldactone 50 mg daily at home  - currently holding lasix and aldactone given low sBP  - f/u daily MELD labs (CMP, coags) Hx of alcoholic cirrhosis with prior ascites. Follows Dr. Gissell lay and is prescribed lasix 20mg qd, aldactone 50mg qd. Abd US performed 7/22 showing hepatic cirrhosis with mild ascites in RLQ pocket. Liver size 17.6 cm.  - MELD-Na score of 15 (6% 3-month mortality)  - on lasix 20 mg, aldactone 50 mg daily at home  - Resume lasix, spironolactone per GI recs   - f/u daily MELD labs (CMP, coags)

## 2020-07-24 NOTE — PROGRESS NOTE ADULT - ASSESSMENT
60F w/ a PMH significant for EtOH abuse, alcoholic cirrhosis, anemia, prior upper GI bleeds, who presents with fevers and increasing abdominal distention associated with pain and generalized weakness x3 days.    #Cirrhosis w/ presumed SBP - MELD-Na: 16  PSE: none appreciated  Ascites: moderate, s/p paracentesis w/ 2L removed  HCC: none seen on US  Varices: seen on EGD at OSH in 6/2020  - please obtain records from OSH in [facility]  - continue ceftriaxone for a total of 5 days  - based on AASLD guidelines, s/p 1.5 g/kg albumin and would give 1 g/kg tomorrow at noon  - low salt diet  - can restart diuretics at home dose  - ascites fluid studies consistent w/ SBP: SAAG 2.3, nucleated cells 6817 w/ 87% PMNs, and only 1000 RBCs  - will require lifelong abx SBP ppx with bactrim one DS tablet daily given cipro allergy  - daily MELD labs: CMP, INR    Thank you for allowing us to participate in the care of this patient.  GI will sign off. Please call back with any questions or concerns.     Clyde Sparks MD  PGY-4, Gastroenterology Fellow  pager: [phone] 60F w/ a PMH significant for EtOH abuse, alcoholic cirrhosis, anemia, prior upper GI bleeds, who presents with fevers and increasing abdominal distention associated with pain and generalized weakness x3 days.    #Cirrhosis w/ presumed SBP - MELD-Na: 11  PSE: none appreciated  Ascites: moderate, s/p paracentesis w/ 2L removed  HCC: none seen on US  Varices: seen on EGD at OSH in 6/2020  - please obtain records from OSH in [facility]  - continue ceftriaxone for a total of 5 days  - based on AASLD guidelines, s/p 1.5 g/kg albumin and would give 1 g/kg tomorrow at noon  - low salt diet  - can restart diuretics at home dose  - ascites fluid studies consistent w/ SBP: SAAG 2.3, nucleated cells 6817 w/ 87% PMNs, and only 1000 RBCs  - will require lifelong abx SBP ppx with bactrim one DS tablet daily given cipro allergy  - daily MELD labs: CMP, INR    Thank you for allowing us to participate in the care of this patient.  GI will sign off. Please call back with any questions or concerns.     Clyde Sparks MD  PGY-4, Gastroenterology Fellow  pager: [phone] 60F w/ a PMH significant for EtOH abuse, alcoholic cirrhosis, anemia, prior upper GI bleeds, who presents with fevers and increasing abdominal distention associated with pain and generalized weakness x3 days.    #Cirrhosis w/ presumed SBP - MELD-Na: 11  PSE: none appreciated  Ascites: moderate, s/p paracentesis w/ 2L removed  HCC: none seen on US  Varices: seen on EGD at OSH in 6/2020  - please obtain records from OSH in [facility]  - continue ceftriaxone for a total of 5 days  - based on AASLD guidelines, s/p 1.5 g/kg albumin and would give 1 g/kg tomorrow at noon  - low salt diet  - can restart diuretics at home dose  - ascites fluid studies consistent w/ SBP: SAAG 2.3, nucleated cells 6817 w/ 87% PMNs, and only 1000 RBCs  - will require lifelong abx SBP ppx with bactrim one DS tablet daily given cipro allergy  - daily MELD labs: CMP, INR  - counseled patient extensively on EtOH cessation    Thank you for allowing us to participate in the care of this patient.  GI will sign off. Please call back with any questions or concerns.     Clyde Sparks MD  PGY-4, Gastroenterology Fellow  pager: [phone]

## 2020-07-24 NOTE — DISCHARGE NOTE PROVIDER - CARE PROVIDER_API CALL
Kevin Gibbs  GASTROENTEROLOGY  [address] [address]  Phone: [phone]  Fax: [phone]  Scheduled Appointment: 08/25/2020 02:00 PM

## 2020-07-24 NOTE — PROGRESS NOTE ADULT - ATTENDING COMMENTS
60F w/ a PMH significant for EtOH abuse, alcoholic cirrhosis, anemia, prior upper GI bleeds, who presents with fevers and increasing abdominal distention. Admitted for r/o SBP.    PE: improvement of abdominal distention, with minimal abdominal tenderness. Otherwise, patient awake alert NAD. No other remarkable findings.   -SBP: Patient with elevated PMNs in ascitic fluid, consistent with SBP. Currently on CTX 2g, will continue for a total of five days (day 3/5). Blood cx with NGTD  -Cirrhosis: Saag >2.2, consistent with cirrhosis. GI following, now s/p paracentesis. Will give albumin per their recs. Continue to hold diuretics and will restart as tolerated.   Dispo: To received albumin 1mg/kg tomorrow, then okay to DC on oral abx home 60F w/ a PMH significant for EtOH abuse, alcoholic cirrhosis, anemia, prior upper GI bleeds, who presents with fevers and increasing abdominal distention. Admitted for r/o SBP.    PE: improvement of abdominal distention, with minimal abdominal tenderness. Otherwise, patient awake alert NAD. No other remarkable findings.   -SBP: Patient with elevated PMNs in ascitic fluid, consistent with SBP. Currently on CTX 2g, will continue for a total of five days (day 3/5). Blood cx with NGTD  -Cirrhosis: Saag >2.2, consistent with cirrhosis. GI following, now s/p paracentesis. Will give albumin per their recs. Continue to hold diuretics and will restart as tolerated.   Dispo: To received albumin 1mg/kg tomorrow, then okay to DC on oral abx home with on day of cipro and then bactrim ppx 60F w/ a PMH significant for EtOH abuse, alcoholic cirrhosis, anemia, prior upper GI bleeds, who presents with fevers and increasing abdominal distention. Admitted for r/o SBP.    PE: improvement of abdominal distention, with minimal abdominal tenderness. Otherwise, patient awake alert NAD. No other remarkable findings.   -SBP: Patient with elevated PMNs in ascitic fluid, consistent with SBP. Currently on CTX 2g, will continue for a total of five days (day 3/5). Blood cx with NGTD  -Cirrhosis: Saag >2.2, consistent with cirrhosis. GI following, now s/p paracentesis. Will give albumin per their recs. Continue to hold diuretics and will restart as tolerated.   Dispo: To received albumin 1mg/kg tomorrow, then okay to DC on oral ppx

## 2020-07-24 NOTE — PROGRESS NOTE ADULT - SUBJECTIVE AND OBJECTIVE BOX
INTERVAL HPI/OVERNIGHT EVENTS:  No acute overnight events.    SUBJECTIVE:   Patient seen and examined at bedside this morning. Patient states poor quality of sleep overnight due to patient in next bed making noise at night. She also endorses being unable to eat over the past day because she does not like the food that is served and does not like the DASH diet options.     No other new complaints at this time. She states that his abdominal pain has improved. She otherwise denies any fevers, chills, SOB, cough, chest pain, abd pain, dysuria, changes in BM.     OBJECTIVE:    VITAL SIGNS:  ICU Vital Signs Last 24 Hrs  T(C): 36.8 (2020 05:57), Max: 38 (2020 16:49)  T(F): 98.2 (2020 05:57), Max: 100.4 (2020 16:49)  HR: 90 (2020 05:57) (90 - 111)  BP: 100/65 (2020 05:57) (92/61 - 112/73)  RR: 19 (2020 05:57) (18 - 19)  SpO2: 97% (2020 05:57) (95% - 98%)        CAPILLARY BLOOD GLUCOSE          PHYSICAL EXAM:    GENERAL: In NAD. Lying in bed comfortably.  HEENT: NC/AT. PERRL. EOMI. Neck supple. No JVD. No thyromegaly. No jaundice  CV: RRR. +S1/S2. No murmurs, rubs or gallops  LUNGS: Clear to auscultation b/l. No wheezing, rales or rhonchi.  ABDOMEN: Soft, mildly distended (improved from day prior), and mildly tender to palpation in all 4 quadrants. +BS. No guarding or rebound tenderness.  EXT: WWP. No edema in b/l extremities  VASCULAR: 2+ radial, DP bilaterally  NEURO: AAOx3. No asterixis    MEDICATIONS:  MEDICATIONS  (STANDING):  cefTRIAXone   IVPB 2000 milliGRAM(s) IV Intermittent every 24 hours  folic acid 1 milliGRAM(s) Oral daily  multivitamin 1 Tablet(s) Oral daily  pantoprazole    Tablet 40 milliGRAM(s) Oral before breakfast  thiamine 100 milliGRAM(s) Oral daily    MEDICATIONS  (PRN):  acetaminophen   Tablet .. 650 milliGRAM(s) Oral every 6 hours PRN Mild Pain (1 - 3), Moderate Pain (4 - 6)  bisacodyl 5 milliGRAM(s) Oral every 12 hours PRN Constipation  oxycodone    5 mG/acetaminophen 325 mG 1 Tablet(s) Oral once PRN Severe Pain (7 - 10)      ALLERGIES:  Allergies    Cipro (Hives; Urticaria)    Intolerances        LABS:                        7.5    7.17  )-----------( 141      ( 2020 11:25 )             23.7     07-    137  |  106  |  9   ----------------------------<  87  3.9   |  21<L>  |  0.68    Ca    8.2<L>      2020 11:25    TPro  5.1<L>  /  Alb  2.9<L>  /  TBili  2.9<H>  /  DBili  x   /  AST  55<H>  /  ALT  22  /  AlkPhos  123<H>  0722    PT/INR - ( 2020 11:25 )   PT: 15.6 sec;   INR: 1.32          PTT - ( 2020 11:25 )  PTT:32.9 sec  Urinalysis Basic - ( 2020 23:39 )    Color: Yellow / Appearance: Clear / S.025 / pH: x  Gluc: x / Ketone: NEGATIVE  / Bili: Negative / Urobili: 1.0 E.U./dL   Blood: x / Protein: NEGATIVE mg/dL / Nitrite: NEGATIVE   Leuk Esterase: NEGATIVE / RBC: < 5 /HPF / WBC < 5 /HPF   Sq Epi: x / Non Sq Epi: 0-5 /HPF / Bacteria: Present /HPF        RADIOLOGY & ADDITIONAL TESTS: Reviewed. INTERVAL HPI/OVERNIGHT EVENTS:  No acute overnight events.    SUBJECTIVE:   Patient seen and examined at bedside this morning. Patient states poor quality of sleep overnight due to patient in next bed making noise at night. She also endorses being unable to eat over the past day because she does not like the food that is served and does not like the DASH diet options.     No other new complaints at this time. She states that his abdominal pain has improved. She otherwise denies any fevers, chills, SOB, cough, chest pain, abd pain, dysuria, changes in BM.     OBJECTIVE:    VITAL SIGNS:  ICU Vital Signs Last 24 Hrs  T(C): 36.8 (2020 05:57), Max: 38 (2020 16:49)  T(F): 98.2 (2020 05:57), Max: 100.4 (2020 16:49)  HR: 90 (2020 05:57) (90 - 111)  BP: 100/65 (2020 05:57) (92/61 - 112/73)  RR: 19 (2020 05:57) (18 - 19)  SpO2: 97% (2020 05:57) (95% - 98%)        CAPILLARY BLOOD GLUCOSE          PHYSICAL EXAM:    GENERAL: In NAD. Lying in bed comfortably.  HEENT: NC/AT. PERRL. EOMI. Neck supple. No JVD. No thyromegaly. No jaundice  CV: RRR. +S1/S2. No murmurs, rubs or gallops  LUNGS: Clear to auscultation b/l. No wheezing, rales or rhonchi.  ABDOMEN: Soft, mildly distended, and mildly tender to palpation in all 4 quadrants. +BS. No guarding or rebound tenderness.  EXT: WWP. No edema in b/l extremities  VASCULAR: 2+ radial, DP bilaterally  NEURO: AAOx3. No asterixis    MEDICATIONS:  MEDICATIONS  (STANDING):  cefTRIAXone   IVPB 2000 milliGRAM(s) IV Intermittent every 24 hours  folic acid 1 milliGRAM(s) Oral daily  multivitamin 1 Tablet(s) Oral daily  pantoprazole    Tablet 40 milliGRAM(s) Oral before breakfast  thiamine 100 milliGRAM(s) Oral daily    MEDICATIONS  (PRN):  acetaminophen   Tablet .. 650 milliGRAM(s) Oral every 6 hours PRN Mild Pain (1 - 3), Moderate Pain (4 - 6)  bisacodyl 5 milliGRAM(s) Oral every 12 hours PRN Constipation  oxycodone    5 mG/acetaminophen 325 mG 1 Tablet(s) Oral once PRN Severe Pain (7 - 10)      ALLERGIES:  Allergies    Cipro (Hives; Urticaria)    Intolerances        LABS:                        7.5    7.17  )-----------( 141      ( 2020 11:25 )             23.7     -    137  |  106  |  9   ----------------------------<  87  3.9   |  21<L>  |  0.68    Ca    8.2<L>      2020 11:25    TPro  5.1<L>  /  Alb  2.9<L>  /  TBili  2.9<H>  /  DBili  x   /  AST  55<H>  /  ALT  22  /  AlkPhos  123<H>  07    PT/INR - ( 2020 11:25 )   PT: 15.6 sec;   INR: 1.32          PTT - ( 2020 11:25 )  PTT:32.9 sec  Urinalysis Basic - ( 2020 23:39 )    Color: Yellow / Appearance: Clear / S.025 / pH: x  Gluc: x / Ketone: NEGATIVE  / Bili: Negative / Urobili: 1.0 E.U./dL   Blood: x / Protein: NEGATIVE mg/dL / Nitrite: NEGATIVE   Leuk Esterase: NEGATIVE / RBC: < 5 /HPF / WBC < 5 /HPF   Sq Epi: x / Non Sq Epi: 0-5 /HPF / Bacteria: Present /HPF        RADIOLOGY & ADDITIONAL TESTS: Reviewed.

## 2020-07-26 ENCOUNTER — EMERGENCY (EMERGENCY)
Facility: HOSPITAL | Age: 60
LOS: 1 days | Discharge: HOME | End: 2020-07-26
Attending: EMERGENCY MEDICINE | Admitting: EMERGENCY MEDICINE
Payer: MEDICAID

## 2020-07-26 VITALS
HEIGHT: 64 IN | SYSTOLIC BLOOD PRESSURE: 107 MMHG | DIASTOLIC BLOOD PRESSURE: 73 MMHG | HEART RATE: 118 BPM | OXYGEN SATURATION: 98 % | RESPIRATION RATE: 18 BRPM | TEMPERATURE: 99 F | WEIGHT: 115.08 LBS

## 2020-07-26 VITALS — HEART RATE: 77 BPM | OXYGEN SATURATION: 97 % | RESPIRATION RATE: 16 BRPM | TEMPERATURE: 98 F

## 2020-07-26 DIAGNOSIS — K74.60 UNSPECIFIED CIRRHOSIS OF LIVER: ICD-10-CM

## 2020-07-26 DIAGNOSIS — Z88.1 ALLERGY STATUS TO OTHER ANTIBIOTIC AGENTS STATUS: ICD-10-CM

## 2020-07-26 DIAGNOSIS — R60.0 LOCALIZED EDEMA: ICD-10-CM

## 2020-07-26 DIAGNOSIS — R10.9 UNSPECIFIED ABDOMINAL PAIN: ICD-10-CM

## 2020-07-26 LAB
ALBUMIN SERPL ELPH-MCNC: 3.1 G/DL — LOW (ref 3.4–5)
ALP SERPL-CCNC: 151 U/L — HIGH (ref 40–120)
ALT FLD-CCNC: 28 U/L — SIGNIFICANT CHANGE UP (ref 12–42)
ANION GAP SERPL CALC-SCNC: 8 MMOL/L — LOW (ref 9–16)
APTT BLD: 36.9 SEC — HIGH (ref 27.5–35.5)
AST SERPL-CCNC: 54 U/L — HIGH (ref 15–37)
BASOPHILS # BLD AUTO: 0.02 K/UL — SIGNIFICANT CHANGE UP (ref 0–0.2)
BASOPHILS NFR BLD AUTO: 0.5 % — SIGNIFICANT CHANGE UP (ref 0–2)
BILIRUB SERPL-MCNC: 1.6 MG/DL — HIGH (ref 0.2–1.2)
BUN SERPL-MCNC: 7 MG/DL — SIGNIFICANT CHANGE UP (ref 7–23)
CALCIUM SERPL-MCNC: 9.1 MG/DL — SIGNIFICANT CHANGE UP (ref 8.5–10.5)
CHLORIDE SERPL-SCNC: 105 MMOL/L — SIGNIFICANT CHANGE UP (ref 96–108)
CO2 SERPL-SCNC: 27 MMOL/L — SIGNIFICANT CHANGE UP (ref 22–31)
CREAT SERPL-MCNC: 0.64 MG/DL — SIGNIFICANT CHANGE UP (ref 0.5–1.3)
CULTURE RESULTS: NO GROWTH — SIGNIFICANT CHANGE UP
EOSINOPHIL # BLD AUTO: 0.11 K/UL — SIGNIFICANT CHANGE UP (ref 0–0.5)
EOSINOPHIL NFR BLD AUTO: 2.5 % — SIGNIFICANT CHANGE UP (ref 0–6)
GLUCOSE SERPL-MCNC: 101 MG/DL — HIGH (ref 70–99)
HCT VFR BLD CALC: 24.9 % — LOW (ref 34.5–45)
HGB BLD-MCNC: 8.1 G/DL — LOW (ref 11.5–15.5)
IMM GRANULOCYTES NFR BLD AUTO: 0.2 % — SIGNIFICANT CHANGE UP (ref 0–1.5)
INR BLD: 1.28 — HIGH (ref 0.88–1.16)
LYMPHOCYTES # BLD AUTO: 1.21 K/UL — SIGNIFICANT CHANGE UP (ref 1–3.3)
LYMPHOCYTES # BLD AUTO: 27.3 % — SIGNIFICANT CHANGE UP (ref 13–44)
MAGNESIUM SERPL-MCNC: 1.5 MG/DL — LOW (ref 1.6–2.6)
MCHC RBC-ENTMCNC: 28 PG — SIGNIFICANT CHANGE UP (ref 27–34)
MCHC RBC-ENTMCNC: 32.5 GM/DL — SIGNIFICANT CHANGE UP (ref 32–36)
MCV RBC AUTO: 86.2 FL — SIGNIFICANT CHANGE UP (ref 80–100)
MONOCYTES # BLD AUTO: 0.61 K/UL — SIGNIFICANT CHANGE UP (ref 0–0.9)
MONOCYTES NFR BLD AUTO: 13.7 % — SIGNIFICANT CHANGE UP (ref 2–14)
NEUTROPHILS # BLD AUTO: 2.48 K/UL — SIGNIFICANT CHANGE UP (ref 1.8–7.4)
NEUTROPHILS NFR BLD AUTO: 55.8 % — SIGNIFICANT CHANGE UP (ref 43–77)
NON-GYNECOLOGICAL CYTOLOGY STUDY: SIGNIFICANT CHANGE UP
NRBC # BLD: 0 /100 WBCS — SIGNIFICANT CHANGE UP (ref 0–0)
PLATELET # BLD AUTO: 166 K/UL — SIGNIFICANT CHANGE UP (ref 150–400)
POTASSIUM SERPL-MCNC: 3.8 MMOL/L — SIGNIFICANT CHANGE UP (ref 3.5–5.3)
POTASSIUM SERPL-SCNC: 3.8 MMOL/L — SIGNIFICANT CHANGE UP (ref 3.5–5.3)
PROT SERPL-MCNC: 6.2 G/DL — LOW (ref 6.4–8.2)
PROTHROM AB SERPL-ACNC: 15.2 SEC — HIGH (ref 10.6–13.6)
RBC # BLD: 2.89 M/UL — LOW (ref 3.8–5.2)
RBC # FLD: 17.8 % — HIGH (ref 10.3–14.5)
SODIUM SERPL-SCNC: 140 MMOL/L — SIGNIFICANT CHANGE UP (ref 132–145)
SPECIMEN SOURCE: SIGNIFICANT CHANGE UP
WBC # BLD: 4.44 K/UL — SIGNIFICANT CHANGE UP (ref 3.8–10.5)
WBC # FLD AUTO: 4.44 K/UL — SIGNIFICANT CHANGE UP (ref 3.8–10.5)

## 2020-07-26 PROCEDURE — 74176 CT ABD & PELVIS W/O CONTRAST: CPT | Mod: 26

## 2020-07-26 PROCEDURE — 99285 EMERGENCY DEPT VISIT HI MDM: CPT

## 2020-07-26 RX ORDER — MAGNESIUM SULFATE 500 MG/ML
1 VIAL (ML) INJECTION ONCE
Refills: 0 | Status: COMPLETED | OUTPATIENT
Start: 2020-07-26 | End: 2020-07-26

## 2020-07-26 RX ORDER — CEFTRIAXONE 500 MG/1
2000 INJECTION, POWDER, FOR SOLUTION INTRAMUSCULAR; INTRAVENOUS ONCE
Refills: 0 | Status: COMPLETED | OUTPATIENT
Start: 2020-07-26 | End: 2020-07-26

## 2020-07-26 RX ADMIN — CEFTRIAXONE 100 MILLIGRAM(S): 500 INJECTION, POWDER, FOR SOLUTION INTRAMUSCULAR; INTRAVENOUS at 03:11

## 2020-07-26 RX ADMIN — Medication 100 GRAM(S): at 06:09

## 2020-07-26 NOTE — ED PROVIDER NOTE - PROVIDER TOKENS
PROVIDER:[TOKEN:[[id number]]],PROVIDER:[TOKEN:[[id number]]],PROVIDER:[TOKEN:[[id number]]]

## 2020-07-26 NOTE — ED PROVIDER NOTE - CARE PROVIDERS DIRECT ADDRESSES
,[email or web address]

## 2020-07-26 NOTE — ED PROVIDER NOTE - OBJECTIVE STATEMENT
60 F presenting with abdominal wall edema that started on the R side and then progressed to the pubic mons. She can't tell if it feels like her ascites is reaccumulating. She is also upset that she was told to stop her Lasix and Spironolactone and have a low salt diet. Her d/c papers however said to continue the meds. She dn want to fu with her old GI MD. No f/c. She is taking her PO abx.     Initially confrontational, then calmed down.  He has also been sober for 31 days.

## 2020-07-26 NOTE — ED ADULT TRIAGE NOTE - CHIEF COMPLAINT QUOTE
Pt arrived ambulatory, complaining of worsening abdominal pain. Pt refused tell further, appears agitated. Recently admitted to [facility] for ascites. When asked, pt states "I don't need to tell anything else. You can pull everything in the system." Pt offered rectal temp on arrival, but refused. States "get me somebody that can help me." Pt directed to room.

## 2020-07-26 NOTE — ED ADULT NURSE REASSESSMENT NOTE - COMFORT CARE
assisted to bathroom/treatment delay explained/warm blanket provided/side rails down/wait time explained/darkened lights/plan of care explained/repositioned

## 2020-07-26 NOTE — ED PROVIDER NOTE - NSFOLLOWUPINSTRUCTIONS_ED_ALL_ED_FT
Your abdominal wall and groin region edema is likely a benign complication fo your recent paracentesis.    Return to the ER for increased abdominal fullness, ascites, fever, chills or pain.     Please follow up with one of our GI MDs.

## 2020-07-26 NOTE — ED PROVIDER NOTE - CLINICAL SUMMARY MEDICAL DECISION MAKING FREE TEXT BOX
Patient presenting with abdominal wall edema and feeling of fullness. No f/c. Recently d/c'd for SBP- on abx and is following a low salt diet. Will check labs and CTAP.

## 2020-07-26 NOTE — ED PROVIDER NOTE - PATIENT PORTAL LINK FT
You can access the FollowMyHealth Patient Portal offered by [facility] by registering at the following website: [email or web address]. By joining [employer]’s FollowMyHealth portal, you will also be able to view your health information using other applications (apps) compatible with our system.

## 2020-07-26 NOTE — ED PROVIDER NOTE - PROGRESS NOTE DETAILS
Labs wnl, CTAp wnl- skin edema seems 2/2 recent procedure (fluid tracing thru needle fistula. No signs of active infection. Dosed with CTX. Patient will continue her diuretics. Will give other names for GI fu.

## 2020-07-26 NOTE — ED ADULT NURSE NOTE - CHIEF COMPLAINT QUOTE
Pt arrived ambulatory, complaining of worsening abdominal pain. Pt refused tell further, appears agitated. Recently admitted to [facility] for ascites. When asked, pt states "I don't need to tell anything else. You can pull everything in the system." Pt offered rectal temp on arrival, but refused. States "get me somebody that can help me." Pt directed to room.

## 2020-07-26 NOTE — ED PROVIDER NOTE - PHYSICAL EXAMINATION
VITAL SIGNS: I have reviewed nursing notes and confirm.  CONSTITUTIONAL: Well-developed; well-nourished; in no acute distress.  SKIN: Skin is warm and dry, no acute rash. + R sided abdominal wall that is contiguous with pubic mons edema, no redness, no crepitus, no skin tenderness  HEAD: Normocephalic; atraumatic.  EYES: Pupils round bilaterally, 3mm; conjunctiva and sclera clear.  ENT: No nasal discharge; airway clear, MMM.  NECK: Supple; non tender.  CARD: S1, S2 normal; no murmurs, gallops, or rubs. Regular rate and rhythm.  RESP: No wheezes, rales or rhonchi.  : No CVAT tenderness bilaterally   ABD: Soft; non-distended; non-tender; no rebound or guarding. No ascites clinically.   EXT: Normal ROM. No clubbing, cyanosis or edema.  NEURO: Alert, oriented. Grossly unremarkable. MARTIN, normal tone, no gross motor or sensory changes. Fluent speech.   PSYCH: Cooperative, appropriate. Mood and affect wnl.

## 2020-07-29 DIAGNOSIS — D64.9 ANEMIA, UNSPECIFIED: ICD-10-CM

## 2020-07-29 DIAGNOSIS — F10.19 ALCOHOL ABUSE WITH UNSPECIFIED ALCOHOL-INDUCED DISORDER: ICD-10-CM

## 2020-07-29 DIAGNOSIS — R63.8 OTHER SYMPTOMS AND SIGNS CONCERNING FOOD AND FLUID INTAKE: ICD-10-CM

## 2020-07-29 DIAGNOSIS — R94.31 ABNORMAL ELECTROCARDIOGRAM [ECG] [EKG]: ICD-10-CM

## 2020-07-29 DIAGNOSIS — R18.8 OTHER ASCITES: ICD-10-CM

## 2020-07-29 DIAGNOSIS — K65.2 SPONTANEOUS BACTERIAL PERITONITIS: ICD-10-CM

## 2020-07-29 DIAGNOSIS — R50.9 FEVER, UNSPECIFIED: ICD-10-CM

## 2020-07-29 DIAGNOSIS — K70.31 ALCOHOLIC CIRRHOSIS OF LIVER WITH ASCITES: ICD-10-CM

## 2020-07-29 DIAGNOSIS — A41.9 SEPSIS, UNSPECIFIED ORGANISM: ICD-10-CM

## 2020-07-31 LAB
CULTURE RESULTS: SIGNIFICANT CHANGE UP
SPECIMEN SOURCE: SIGNIFICANT CHANGE UP

## 2020-08-25 ENCOUNTER — APPOINTMENT (OUTPATIENT)
Age: 60
End: 2020-08-25
Payer: MEDICAID

## 2020-08-25 VITALS
BODY MASS INDEX: 20.32 KG/M2 | RESPIRATION RATE: 12 BRPM | HEART RATE: 94 BPM | DIASTOLIC BLOOD PRESSURE: 60 MMHG | HEIGHT: 64 IN | TEMPERATURE: 98.9 F | SYSTOLIC BLOOD PRESSURE: 100 MMHG | WEIGHT: 119 LBS | OXYGEN SATURATION: 95 %

## 2020-08-25 PROCEDURE — 99214 OFFICE O/P EST MOD 30 MIN: CPT

## 2020-08-25 RX ORDER — PANTOPRAZOLE 40 MG/1
40 TABLET, DELAYED RELEASE ORAL
Qty: 30 | Refills: 5 | Status: ACTIVE | COMMUNITY
Start: 2020-07-21 | End: 1900-01-01

## 2020-08-25 RX ORDER — FUROSEMIDE 20 MG/1
20 TABLET ORAL DAILY
Qty: 30 | Refills: 5 | Status: ACTIVE | COMMUNITY
Start: 2020-08-25 | End: 1900-01-01

## 2020-08-25 RX ORDER — CIPROFLOXACIN HYDROCHLORIDE 500 MG/1
500 TABLET, FILM COATED ORAL TWICE DAILY
Qty: 14 | Refills: 1 | Status: COMPLETED | COMMUNITY
Start: 2019-02-26 | End: 2020-08-25

## 2020-08-25 NOTE — ED PROVIDER NOTE - TEMPLATE
other (specify)/obese/No overt signs of muscle wasting/fat loss Height: 5'8", Weight: (8/25) 275.5lbs, BMI 42.1  IBW: 154lbs +/- 10%, 179% IBW Abdominal Pain, N/V/D

## 2020-09-01 PROBLEM — F41.9 ANXIETY: Status: ACTIVE | Noted: 2019-02-26

## 2020-09-01 NOTE — ASSESSMENT
[FreeTextEntry1] : 60yF presenting for follow-up of upper GI bleed and decompensated cirrhosis\par \par 1. Upper GI bleed - Secondary to portal hypertensive gastropathy\par - Management of cirrhosis as below\par \par 2. Decompensated cirrhosis (+ascites; -VH; -HE) - Secondary to EtOH; autoimmune, infiltrative, and infectious w/u negative for alternative etiology. History of SBP diagnosed at OSH.\par - Bactrim DS PO QD for lifelong SBP PPx\par - PPI 40mg QD for hx of anemia and upper GI hemorrhage 2/2 portal hypertensive gastropathy; no varices\par - Spironolactone 50mg QD; Lasix 20mg QD for ascites\par - Patient has scheduled follow-up with Hepatologist at [facility] on 9/1\par \par RTC as needed\par Patient seen and discussed with attending physician

## 2020-09-01 NOTE — PHYSICAL EXAM
[General Appearance - Alert] : alert [General Appearance - Well-Appearing] : healthy appearing [General Appearance - Well Nourished] : well nourished [General Appearance - In No Acute Distress] : in no acute distress [Hearing Threshold Finger Rub Not Cook] : hearing was normal [Extraocular Movements] : extraocular movements were intact [PERRL With Normal Accommodation] : pupils were equal in size, round, and reactive to light [Sclera] : the sclera and conjunctiva were normal [Examination Of The Oral Cavity] : the lips and gums were normal [Jugular Venous Distention Increased] : there was no jugular-venous distention [Exaggerated Use Of Accessory Muscles For Inspiration] : no accessory muscle use [] : no respiratory distress [Respiration, Rhythm And Depth] : normal respiratory rhythm and effort [Heart Rate And Rhythm] : heart rate was normal and rhythm regular [Involuntary Movements] : no involuntary movements were seen [Abnormal Walk] : normal gait [Skin Color & Pigmentation] : normal skin color and pigmentation [Oriented To Time, Place, And Person] : oriented to person, place, and time [Skin Turgor] : normal skin turgor [Impaired Insight] : insight and judgment were intact [FreeTextEntry1] : Soft, mildly distended, dull to tympany

## 2020-09-01 NOTE — HISTORY OF PRESENT ILLNESS
[de-identified] : Patient is a 61yo F with decompensated EtOH-induced cirrhosis (+ascites/SBP, -VH, -HE) who is presenting for inpatient follow-up of upper GI hemorrhage 2/2 portal hypertensive gastropathy in March at [facility]. Patient with a repeat hospitalization in June [facility] at [facility] with reported Hgb of 3 and repeat EGD - also portal hypertensive gastropathy after episode of drinking. Patient has remained abstinent since that time (now 2 months) with good social support, has seen a psychiatrist and is maintaining follow-up. Has felt well, but has run out of all medications and is feeling a little abdominal distention and LE edema due to being out of diuretics. [place] removed from temptation [facility] and has good social support. Patient has follow-up set with transplant hepatologist at [facility] in September.

## 2020-09-02 ENCOUNTER — APPOINTMENT (OUTPATIENT)
Dept: TRANSPLANT | Facility: CLINIC | Age: 60
End: 2020-09-02
Payer: MEDICAID

## 2020-09-02 VITALS
OXYGEN SATURATION: 99 % | HEART RATE: 108 BPM | DIASTOLIC BLOOD PRESSURE: 64 MMHG | SYSTOLIC BLOOD PRESSURE: 99 MMHG | RESPIRATION RATE: 18 BRPM | TEMPERATURE: 98 F | BODY MASS INDEX: 20.32 KG/M2 | HEIGHT: 64 IN | WEIGHT: 119 LBS

## 2020-09-02 DIAGNOSIS — R63.4 ABNORMAL WEIGHT LOSS: ICD-10-CM

## 2020-09-02 DIAGNOSIS — Z86.19 PERSONAL HISTORY OF OTHER INFECTIOUS AND PARASITIC DISEASES: ICD-10-CM

## 2020-09-02 DIAGNOSIS — F41.9 ANXIETY DISORDER, UNSPECIFIED: ICD-10-CM

## 2020-09-02 PROCEDURE — 99204 OFFICE O/P NEW MOD 45 MIN: CPT

## 2020-09-04 ENCOUNTER — LABORATORY RESULT (OUTPATIENT)
Age: 60
End: 2020-09-04

## 2020-09-11 LAB
ABO + RH PNL BLD: NORMAL
ALBUMIN SERPL ELPH-MCNC: 4.3 G/DL
ALP BLD-CCNC: 134 U/L
ALT SERPL-CCNC: 26 U/L
ANION GAP SERPL CALC-SCNC: 12 MMOL/L
AST SERPL-CCNC: 51 U/L
BASOPHILS # BLD AUTO: 0.02 K/UL
BASOPHILS NFR BLD AUTO: 0.8 %
BILIRUB SERPL-MCNC: 1.2 MG/DL
BUN SERPL-MCNC: 10 MG/DL
CALCIUM SERPL-MCNC: 9.9 MG/DL
CHLORIDE SERPL-SCNC: 103 MMOL/L
CMV IGG SERPL QL: >10 U/ML
CMV IGG SERPL-IMP: POSITIVE
CO2 SERPL-SCNC: 25 MMOL/L
CREAT SERPL-MCNC: 0.67 MG/DL
EBV EA AB SER IA-ACNC: 134 U/ML
EBV EA AB TITR SER IF: NEGATIVE
EBV EA IGG SER QL IA: <3 U/ML
EBV EA IGG SER-ACNC: POSITIVE
EBV EA IGM SER IA-ACNC: NEGATIVE
EBV PATRN SPEC IB-IMP: NORMAL
EBV VCA IGG SER IA-ACNC: >750 U/ML
EBV VCA IGM SER QL IA: 15.5 U/ML
EOSINOPHIL # BLD AUTO: 0.03 K/UL
EOSINOPHIL NFR BLD AUTO: 1.2 %
EPSTEIN-BARR VIRUS CAPSID ANTIGEN IGG: POSITIVE
ESTIMATED AVERAGE GLUCOSE: 105 MG/DL
ETHANOL BLD-MCNC: <10 MG/DL
GLUCOSE SERPL-MCNC: 132 MG/DL
HBA1C MFR BLD HPLC: 5.3 %
HCT VFR BLD CALC: 30.3 %
HEPATITIS A IGG ANTIBODY: REACTIVE
HGB BLD-MCNC: 8.7 G/DL
HIV1+2 AB SPEC QL IA.RAPID: NONREACTIVE
HSV 1+2 IGG SER IA-IMP: NEGATIVE
HSV 1+2 IGG SER IA-IMP: POSITIVE
HSV1 IGG SER QL: 35.7 INDEX
HSV2 IGG SER QL: 0.15 INDEX
IMM GRANULOCYTES NFR BLD AUTO: 0 %
INR PPP: 1.17 RATIO
LYMPHOCYTES # BLD AUTO: 0.68 K/UL
LYMPHOCYTES NFR BLD AUTO: 28.1 %
M TB IFN-G BLD-IMP: NEGATIVE
MAN DIFF?: NORMAL
MCHC RBC-ENTMCNC: 26.4 PG
MCHC RBC-ENTMCNC: 28.7 GM/DL
MCV RBC AUTO: 91.8 FL
MONOCYTES # BLD AUTO: 0.33 K/UL
MONOCYTES NFR BLD AUTO: 13.6 %
NEUTROPHILS # BLD AUTO: 1.36 K/UL
NEUTROPHILS NFR BLD AUTO: 56.3 %
PLATELET # BLD AUTO: 191 K/UL
POTASSIUM SERPL-SCNC: 3.6 MMOL/L
PROT SERPL-MCNC: 6.8 G/DL
PT BLD: 13.7 SEC
QUANTIFERON TB PLUS MITOGEN MINUS NIL: 1.82 IU/ML
QUANTIFERON TB PLUS NIL: 0.01 IU/ML
QUANTIFERON TB PLUS TB1 MINUS NIL: 0 IU/ML
QUANTIFERON TB PLUS TB2 MINUS NIL: 0 IU/ML
RBC # BLD: 3.3 M/UL
RBC # FLD: 16.6 %
RUBV IGG FLD-ACNC: 6.6 INDEX
RUBV IGG SER-IMP: POSITIVE
SARS-COV-2 IGG SERPL IA-ACNC: 0.08 INDEX
SARS-COV-2 IGG SERPL QL IA: NEGATIVE
SODIUM SERPL-SCNC: 139 MMOL/L
T GONDII AB SER-IMP: NEGATIVE
T GONDII IGG SER QL: <3 IU/ML
T PALLIDUM AB SER QL IA: NEGATIVE
VZV AB TITR SER: POSITIVE
VZV IGG SER IF-ACNC: 3000 INDEX
WBC # FLD AUTO: 2.42 K/UL

## 2020-09-14 LAB — PHOSPHATIDYETHANOL (PETH), WHOLE BLOOD: NEGATIVE NG/ML

## 2020-10-02 ENCOUNTER — APPOINTMENT (OUTPATIENT)
Dept: MRI IMAGING | Facility: CLINIC | Age: 60
End: 2020-10-02

## 2020-12-01 ENCOUNTER — APPOINTMENT (OUTPATIENT)
Age: 60
End: 2020-12-01
Payer: MEDICAID

## 2020-12-01 VITALS
SYSTOLIC BLOOD PRESSURE: 100 MMHG | BODY MASS INDEX: 20.83 KG/M2 | WEIGHT: 122 LBS | DIASTOLIC BLOOD PRESSURE: 60 MMHG | OXYGEN SATURATION: 98 % | HEART RATE: 67 BPM | RESPIRATION RATE: 15 BRPM | HEIGHT: 64 IN | TEMPERATURE: 97.3 F

## 2020-12-01 DIAGNOSIS — K92.0 HEMATEMESIS: ICD-10-CM

## 2020-12-01 DIAGNOSIS — Z87.19 PERSONAL HISTORY OF OTHER DISEASES OF THE DIGESTIVE SYSTEM: ICD-10-CM

## 2020-12-01 DIAGNOSIS — K74.60 UNSPECIFIED CIRRHOSIS OF LIVER: ICD-10-CM

## 2020-12-01 DIAGNOSIS — R18.8 OTHER ASCITES: ICD-10-CM

## 2020-12-01 PROCEDURE — 99072 ADDL SUPL MATRL&STAF TM PHE: CPT

## 2020-12-01 PROCEDURE — 99214 OFFICE O/P EST MOD 30 MIN: CPT

## 2020-12-02 ENCOUNTER — APPOINTMENT (OUTPATIENT)
Dept: HEART AND VASCULAR | Facility: CLINIC | Age: 60
End: 2020-12-02
Payer: MEDICAID

## 2020-12-02 ENCOUNTER — NON-APPOINTMENT (OUTPATIENT)
Age: 60
End: 2020-12-02

## 2020-12-02 ENCOUNTER — LABORATORY RESULT (OUTPATIENT)
Age: 60
End: 2020-12-02

## 2020-12-02 VITALS
WEIGHT: 121 LBS | HEIGHT: 64 IN | HEART RATE: 65 BPM | TEMPERATURE: 97.6 F | DIASTOLIC BLOOD PRESSURE: 71 MMHG | BODY MASS INDEX: 20.66 KG/M2 | SYSTOLIC BLOOD PRESSURE: 101 MMHG

## 2020-12-02 DIAGNOSIS — I47.1 SUPRAVENTRICULAR TACHYCARDIA: ICD-10-CM

## 2020-12-02 DIAGNOSIS — Z87.898 PERSONAL HISTORY OF OTHER SPECIFIED CONDITIONS: ICD-10-CM

## 2020-12-02 PROBLEM — Z87.19 H/O: GI BLEED: Status: ACTIVE | Noted: 2020-07-20

## 2020-12-02 PROBLEM — K74.60 CIRRHOSIS: Status: ACTIVE | Noted: 2020-04-22

## 2020-12-02 PROBLEM — K92.0 HEMATEMESIS: Status: ACTIVE | Noted: 2019-12-20

## 2020-12-02 PROBLEM — R18.8 ASCITES: Status: ACTIVE | Noted: 2020-08-25

## 2020-12-02 PROCEDURE — 93000 ELECTROCARDIOGRAM COMPLETE: CPT

## 2020-12-02 PROCEDURE — 99072 ADDL SUPL MATRL&STAF TM PHE: CPT

## 2020-12-02 PROCEDURE — 99205 OFFICE O/P NEW HI 60 MIN: CPT | Mod: 25

## 2020-12-02 RX ORDER — SPIRONOLACTONE 50 MG/1
50 TABLET ORAL DAILY
Qty: 30 | Refills: 5 | Status: DISCONTINUED | COMMUNITY
Start: 2020-08-25 | End: 2020-12-02

## 2020-12-02 RX ORDER — ONDANSETRON 8 MG/1
8 TABLET, ORALLY DISINTEGRATING ORAL
Qty: 60 | Refills: 0 | Status: DISCONTINUED | COMMUNITY
Start: 2018-10-02 | End: 2020-12-02

## 2020-12-02 RX ORDER — RANITIDINE 300 MG/1
300 TABLET ORAL
Qty: 60 | Refills: 0 | Status: DISCONTINUED | COMMUNITY
Start: 2018-10-02 | End: 2020-12-02

## 2020-12-02 RX ORDER — SULFAMETHOXAZOLE AND TRIMETHOPRIM 800; 160 MG/1; MG/1
800-160 TABLET ORAL DAILY
Qty: 30 | Refills: 5 | Status: DISCONTINUED | COMMUNITY
Start: 2020-08-25 | End: 2020-12-02

## 2020-12-02 NOTE — PHYSICAL EXAM
[General Appearance - Alert] : alert [General Appearance - In No Acute Distress] : in no acute distress [Sclera] : the sclera and conjunctiva were normal [Outer Ear] : the ears and nose were normal in appearance [Neck Appearance] : the appearance of the neck was normal [] : no respiratory distress [Normal] : the heart rate was normal [Abdomen Soft] : soft [Abdomen Tenderness] : non-tender [Abnormal Walk] : normal gait [Skin Color & Pigmentation] : normal skin color and pigmentation [Oriented To Time, Place, And Person] : oriented to person, place, and time [FreeTextEntry1] : no distension

## 2020-12-02 NOTE — DISCUSSION/SUMMARY
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  We discussed the normal conduction system of the heart and what SVT is.  As per her primary EP she had an SVT and was offered an ablation.  While we do not have strips - I will contact her doctor to obtain an EKG.  We discussed treatment options for sVT including rate control medications, antiarrhythmic medications, observation or an ablation.  She would like to have an ablation as soon as possible.  Risks of ablation including, but not limited to, infection, vascular injury, cardiac perforation, injury to intrinsic conduction system necessitating PPM placement were among those discussed. She would like to proceed with EPS/ablation this Friday.  We will get a CBC and COVID swab today.  She was given pre procedure instructions and knows to call with any questions or concerns.

## 2020-12-02 NOTE — HISTORY OF PRESENT ILLNESS
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  She started drinking more heavily and eventually was diagnosed with cirrhosis.  She has esophageal varices that have been banded.  She has been doing well for the past 6+ months from a bleeding perspective. \par \par She states her whole life she has experienced palpitations but thought they were panic attacks.  These episodes have become more frequent recently.  THey have woke her up at night.  She denies any precipitating factors or alleviating factors.  Usually the episodes go away on their own but she has required 2 hospitalization over the past week and a half for sustained palpitations with near syncope.  She states the first time she was cardioverted and the second time it broke with adenosine.  One of these episodes was associated with a hemoglobin of 7 and she got one unit PRBC with Hgb of 11.  She saw an EP doctor [facility] who recommended an ablation (he had strips and hospital records to review) but she wants to have it done in [place] and he referred her to our office.  No history of CAD/stents.  No chest pain, syncope, SOB, orthopnea or PND.  Her Metoprolol has been increased with worsening fatigue and no significant change in her symptoms. \par  She notes a recent echo with a normal EF and some MR.\par

## 2020-12-02 NOTE — PHYSICAL EXAM
[General Appearance - Well Developed] : well developed [Normal Appearance] : normal appearance [Well Groomed] : well groomed [General Appearance - Well Nourished] : well nourished [No Deformities] : no deformities [General Appearance - In No Acute Distress] : no acute distress [Normal Conjunctiva] : the conjunctiva exhibited no abnormalities [Normal Oral Mucosa] : normal oral mucosa [Normal Jugular Venous V Waves Present] : normal jugular venous V waves present [5th Left ICS - MCL] : palpated at the 5th LICS in the midclavicular line [Normal] : normal [No Precordial Heave] : no precordial heave was noted [Apical Thrill] : no thrill palpable at the apex [Normal Rate] : normal [Rhythm Regular] : regular [Normal S1] : normal S1 [Normal S2] : normal S2 [Click] : no click [Distant] : the heart sounds were ~L not distant [Pericardial Rub] : no pericardial rub [No Pitting Edema] : no pitting edema present [] : no respiratory distress [Respiration, Rhythm And Depth] : normal respiratory rhythm and effort [Exaggerated Use Of Accessory Muscles For Inspiration] : no accessory muscle use [Auscultation Breath Sounds / Voice Sounds] : lungs were clear to auscultation bilaterally [Abdomen Soft] : soft [Abnormal Walk] : normal gait [Nail Clubbing] : no clubbing of the fingernails [Skin Color & Pigmentation] : normal skin color and pigmentation [Oriented To Time, Place, And Person] : oriented to person, place, and time [Impaired Insight] : insight and judgment were intact [Affect] : the affect was normal [Mood] : the mood was normal [No Anxiety] : not feeling anxious

## 2020-12-02 NOTE — HISTORY OF PRESENT ILLNESS
[de-identified] : Patient is a 61yo F with decompensated EtOH-induced cirrhosis (+ascites/SBP, -VH, -HE) who is presenting for a follow-up. Patient was seen by Dr Arambula from transplant hepatology in 9/2020. Reports she has been hospitalized thrice since 9/2020 at [facility] with c/o palpitations, is following up with a cardiologist with the plan to follow up with them for an ablation in the near future. Pt brought paper records with her from OSH but those only included the most recent one day admission on 11/29/2020 for palpitations, hb at that time was 11.7.\par Patient denies any recent vomiting/hematemesis/pain abdomen/distension/leg swelling/confusion/tremors/black stool. Reports no change in appetite, has been gaining some weight as she is optimizing her nutrition. Patient reports she has undergone EGD multiple times at OSH since her last EGD in 3/20 at [facility] and was found to have bleeding esophageal varices? and underwent therapy for the same.

## 2020-12-02 NOTE — REVIEW OF SYSTEMS
[Fever] : no fever [Chills] : no chills [Feeling Fatigued] : feeling fatigued [Nausea] : no nausea [Change in Appetite] : no change in appetite [Anxiety] : anxiety [see HPI] : see HPI [Negative] : Neurological

## 2020-12-02 NOTE — ASSESSMENT
[FreeTextEntry1] : Patient is a 59yo F with decompensated EtOH-induced cirrhosis (+ascites/SBP, -VH, -HE) who is presenting for a follow-up.\par \par Decompensated cirrhosis (+ascites; -VH; -HE) - Secondary to EtOH; autoimmune, infiltrative, and infectious w/u negative for alternative etiology. History of SBP diagnosed at OSH.\par -no ascites at present, no e/o UGIB, hb 11.7\par - on Bactrim DS PO QD for  SBP PPx\par - Spironolactone 50mg QD; Lasix 20mg QD for ascites\par - Patient has scheduled follow-up with Hepatologist at [facility] Dr Dale Watkins in 2/2021\par -pt following up with transplant hepatology, Dr Arambula\par -discussed with pt to obtain recent medical records from OSH including EGD reports to clarify if she has esophageal varices and if she required banding or if she needs further treatment sessions if not completely eradicated\par \par \par \par

## 2020-12-03 LAB
ALBUMIN SERPL ELPH-MCNC: 3.8 G/DL
ALP BLD-CCNC: 121 U/L
ALT SERPL-CCNC: 16 U/L
ANION GAP SERPL CALC-SCNC: 9 MMOL/L
AST SERPL-CCNC: 29 U/L
BASOPHILS # BLD AUTO: 0.01 K/UL
BASOPHILS NFR BLD AUTO: 0.3 %
BILIRUB SERPL-MCNC: 0.7 MG/DL
BUN SERPL-MCNC: 8 MG/DL
CALCIUM SERPL-MCNC: 9.4 MG/DL
CHLORIDE SERPL-SCNC: 103 MMOL/L
CO2 SERPL-SCNC: 28 MMOL/L
CREAT SERPL-MCNC: 0.62 MG/DL
EOSINOPHIL # BLD AUTO: 0.11 K/UL
EOSINOPHIL NFR BLD AUTO: 3 %
GLUCOSE SERPL-MCNC: 96 MG/DL
HCT VFR BLD CALC: 34.2 %
HGB BLD-MCNC: 10.6 G/DL
IMM GRANULOCYTES NFR BLD AUTO: 0.3 %
LYMPHOCYTES # BLD AUTO: 1.13 K/UL
LYMPHOCYTES NFR BLD AUTO: 30.7 %
MAN DIFF?: NORMAL
MCHC RBC-ENTMCNC: 28.8 PG
MCHC RBC-ENTMCNC: 31 GM/DL
MCV RBC AUTO: 92.9 FL
MONOCYTES # BLD AUTO: 0.52 K/UL
MONOCYTES NFR BLD AUTO: 14.1 %
NEUTROPHILS # BLD AUTO: 1.9 K/UL
NEUTROPHILS NFR BLD AUTO: 51.6 %
PLATELET # BLD AUTO: 136 K/UL
POTASSIUM SERPL-SCNC: 3.9 MMOL/L
PROT SERPL-MCNC: 6.4 G/DL
RBC # BLD: 3.68 M/UL
RBC # FLD: 20.7 %
SARS-COV-2 N GENE NPH QL NAA+PROBE: NOT DETECTED
SODIUM SERPL-SCNC: 141 MMOL/L
WBC # FLD AUTO: 3.68 K/UL

## 2020-12-04 ENCOUNTER — INPATIENT (INPATIENT)
Facility: HOSPITAL | Age: 60
LOS: 0 days | Discharge: HOME | DRG: 274 | End: 2020-12-05
Attending: INTERNAL MEDICINE | Admitting: INTERNAL MEDICINE
Payer: COMMERCIAL

## 2020-12-04 ENCOUNTER — TRANSCRIPTION ENCOUNTER (OUTPATIENT)
Age: 60
End: 2020-12-04

## 2020-12-04 VITALS
RESPIRATION RATE: 18 BRPM | SYSTOLIC BLOOD PRESSURE: 101 MMHG | HEART RATE: 67 BPM | OXYGEN SATURATION: 97 % | DIASTOLIC BLOOD PRESSURE: 62 MMHG | TEMPERATURE: 98 F

## 2020-12-04 PROCEDURE — 93621 COMP EP EVL L PAC&REC C SINS: CPT | Mod: 26

## 2020-12-04 PROCEDURE — 93613 INTRACARDIAC EPHYS 3D MAPG: CPT

## 2020-12-04 PROCEDURE — 93655 ICAR CATH ABLTJ DSCRT ARRHYT: CPT

## 2020-12-04 PROCEDURE — 99223 1ST HOSP IP/OBS HIGH 75: CPT

## 2020-12-04 PROCEDURE — 93653 COMPRE EP EVAL TX SVT: CPT | Mod: 59

## 2020-12-04 RX ORDER — PANTOPRAZOLE SODIUM 20 MG/1
1 TABLET, DELAYED RELEASE ORAL
Qty: 0 | Refills: 0 | DISCHARGE

## 2020-12-04 RX ORDER — SPIRONOLACTONE 25 MG/1
1 TABLET, FILM COATED ORAL
Qty: 0 | Refills: 0 | DISCHARGE

## 2020-12-04 RX ORDER — METOPROLOL TARTRATE 50 MG
25 TABLET ORAL
Qty: 0 | Refills: 0 | DISCHARGE

## 2020-12-04 RX ORDER — FUROSEMIDE 40 MG
1 TABLET ORAL
Qty: 0 | Refills: 0 | DISCHARGE

## 2020-12-04 RX ORDER — PANTOPRAZOLE SODIUM 20 MG/1
40 TABLET, DELAYED RELEASE ORAL
Refills: 0 | Status: DISCONTINUED | OUTPATIENT
Start: 2020-12-04 | End: 2020-12-05

## 2020-12-04 RX ORDER — FUROSEMIDE 40 MG
20 TABLET ORAL DAILY
Refills: 0 | Status: DISCONTINUED | OUTPATIENT
Start: 2020-12-04 | End: 2020-12-05

## 2020-12-04 RX ORDER — LANOLIN ALCOHOL/MO/W.PET/CERES
3 CREAM (GRAM) TOPICAL ONCE
Refills: 0 | Status: COMPLETED | OUTPATIENT
Start: 2020-12-04 | End: 2020-12-04

## 2020-12-04 RX ADMIN — Medication 3 MILLIGRAM(S): at 23:59

## 2020-12-04 NOTE — DISCHARGE NOTE PROVIDER - HOSPITAL COURSE
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 s/p EPS with inducible AVNRT. s/p ablation of slow pathway.  Metoprolol can be discontinued now that SVT is ablated. 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 s/p EPS with inducible AVNRT. s/p ablation of slow pathway.  Metoprolol can be discontinued now that SVT is ablated.     Pt seen at bedside today, examined found NAD, HD stable, denies any complaints of CP, SOB or palpitations at this time.  VSS.  EKG showed no acute ischemic events.  Tele Monitor/Labs reviewed.  Right Groin access stable without hematoma or bleed.  Right LE pulse stable, 2+, back to baseline.   Pt is to Lasix 20mg daily and Pantoprazole 40mg daily as prescribed.   Pt is deemed stable for discharge per Dr Albrecht with follow up in 1-2 weeks with Dr. Boston for post discharge check-up.  Discharge plans and instruction discussed with pt who is agreeable with plan.  Pt is advised to return to the nearest ED if worsening symptoms of CP, SOB or palpitations or severe bleeding from access site occurs.

## 2020-12-04 NOTE — PROGRESS NOTE ADULT - SUBJECTIVE AND OBJECTIVE BOX
TERE CUEVAS  [id number]    PROCEDURE:  EPS  AVNRT Ablation          INDICATION:  SVT        ELECTROPHYSIOLOGIST(S):  MD Roberth Calhoun MD      FINDINGS:  - Inducible AVNRT  - Successful slow pathway ablation. Non-inducible at the end and no jump.      COMPLICATIONS:  None      RECOMMENDATIONS:  - Bedrest for 2 hours

## 2020-12-04 NOTE — H&P ADULT - HISTORY OF PRESENT ILLNESS
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     She states about 5 years ago she went through a tough time secondary to a divorce and multiple deaths of loved ones. She started drinking more heavily and eventually was diagnosed with cirrhosis. She has esophageal varices that have been banded. She has been doing well for the past 6+ months from a bleeding perspective.      She states her whole life she has experienced palpitations but thought they were panic attacks. These episodes have become more frequent recently and have woken her up at night. She denies any precipitating factors or alleviating factors. Usually the episodes go away on their own but she has required 2 hospitalizations over the past week and a half for sustained palpitations with near syncope. She states the first time she was cardioverted and the second time it broke with adenosine. One of these episodes was associated with a hemoglobin of 7, after which she got one unit PRBC with Hgb of 11. She saw an EP doctor [facility] who recommended an ablation (he had strips and hospital records to review) but she wanted to have it done in [place] and he referred her to our office. No history of CAD/stents. No chest pain, syncope, SOB, orthopnea or PND. Her Metoprolol has been increased with worsening fatigue and no significant change in her symptoms.      She notes a recent echo with a normal EF and some MR.

## 2020-12-04 NOTE — H&P ADULT - ASSESSMENT
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

## 2020-12-04 NOTE — DISCHARGE NOTE PROVIDER - CARE PROVIDER_API CALL
Raffi Calhoun)  Cardiac Electrophysiology  [address], [address] [place] [address]  Phone: [phone]  Fax: [phone]  Follow Up Time:

## 2020-12-04 NOTE — DISCHARGE NOTE PROVIDER - NSDCCPCAREPLAN_GEN_ALL_CORE_FT
PRINCIPAL DISCHARGE DIAGNOSIS  Diagnosis: SVT (supraventricular tachycardia)  Assessment and Plan of Treatment:        PRINCIPAL DISCHARGE DIAGNOSIS  Diagnosis: SVT (supraventricular tachycardia)  Assessment and Plan of Treatment: You underwent a SVT Ablation on 12/4/20.   Recovery from and SVT  catheter ablation is usually fairly straightforward. You may  experience mild symptoms such as an achy chest and discomfort, or bruising in the area where the catheter was inserted but those symptoms should resolve in a few days.   You might also notice skipped heartbeats or irregular heart rhythms (which if worsened, can be controlled with a rate controlling medications).  **please follow up with Dr. Edwards in 1-2 weeks for post discharge check-up.      SECONDARY DISCHARGE DIAGNOSES  Diagnosis: Liver cirrhosis  Assessment and Plan of Treatment: Continue Lasix 20mg daily

## 2020-12-04 NOTE — DISCHARGE NOTE PROVIDER - NSDCFUADDINST_GEN_ALL_CORE_FT
- You had ablation of SVT on 12/4/20.    - You can stop metoprolol.   - Follow up with Dr. Calhoun on Jan 6, 2021 at 11:15 AM   - Wound care instruction:  Avoid strenuous activities such as lifting, running, pushing or sex for 1 week in order to avoid bleeding complications in the groin.  If any questions about the groin, call us at [phone].  Ok to shower tonight.  Avoid bathing for 1 week

## 2020-12-04 NOTE — DISCHARGE NOTE PROVIDER - NSDCFUSCHEDAPPT_GEN_ALL_CORE_FT
TERE CUEVAS ; 01/06/2021 ; [facility] [address]  TERE CUEVAS ; 01/12/2021 ; [facility] G[facility] [address] [place] [address]

## 2020-12-04 NOTE — H&P ADULT - NSICDXPASTMEDICALHX_GEN_ALL_CORE_FT
PAST MEDICAL HISTORY:  Cirrhosis     Gastritis     Lyme disease     Pyelonephritis     SVT (supraventricular tachycardia)

## 2020-12-04 NOTE — DISCHARGE NOTE PROVIDER - NSDCCPTREATMENT_GEN_ALL_CORE_FT
PRINCIPAL PROCEDURE  Procedure: Cardiac ablation  Findings and Treatment: SVT ablation (slow pathway)

## 2020-12-04 NOTE — DISCHARGE NOTE PROVIDER - NSDCMRMEDTOKEN_GEN_ALL_CORE_FT
Lasix 20 mg oral tablet: 1 tab(s) orally once a day  pantoprazole 40 mg oral delayed release tablet: 1 tab(s) orally once a day  pantoprazole 40 mg oral delayed release tablet: 1 tab(s) orally once a day   Lasix 20 mg oral tablet: 1 tab(s) orally once a day  pantoprazole 40 mg oral delayed release tablet: 1 tab(s) orally once a day

## 2020-12-04 NOTE — H&P ADULT - PROBLEM SELECTOR PLAN 1
- Consent for the procedure signed in clinic. The procedure was re-explained and all questions answered.  - Bedrest for 4 hours post-procedure.  - Re-assess metoprolol dose post-procedure. - Consent for the procedure signed in clinic. The procedure was re-explained and all questions answered.  - Admit overnight for HD monitoring. Monitor H&H in setting of liver failure and hx of blood loss anemia.   - Bedrest for 4 hours post-procedure.  - Re-assess metoprolol dose post-procedure.

## 2020-12-05 ENCOUNTER — TRANSCRIPTION ENCOUNTER (OUTPATIENT)
Age: 60
End: 2020-12-05

## 2020-12-05 VITALS
DIASTOLIC BLOOD PRESSURE: 55 MMHG | OXYGEN SATURATION: 98 % | RESPIRATION RATE: 18 BRPM | SYSTOLIC BLOOD PRESSURE: 102 MMHG | HEART RATE: 88 BPM

## 2020-12-05 PROBLEM — I47.1 SUPRAVENTRICULAR TACHYCARDIA: Chronic | Status: ACTIVE | Noted: 2020-12-04

## 2020-12-05 LAB
ANION GAP SERPL CALC-SCNC: 10 MMOL/L — SIGNIFICANT CHANGE UP (ref 5–17)
BUN SERPL-MCNC: 8 MG/DL — SIGNIFICANT CHANGE UP (ref 7–23)
CALCIUM SERPL-MCNC: 9.4 MG/DL — SIGNIFICANT CHANGE UP (ref 8.4–10.5)
CHLORIDE SERPL-SCNC: 105 MMOL/L — SIGNIFICANT CHANGE UP (ref 96–108)
CO2 SERPL-SCNC: 26 MMOL/L — SIGNIFICANT CHANGE UP (ref 22–31)
CREAT SERPL-MCNC: 0.56 MG/DL — SIGNIFICANT CHANGE UP (ref 0.5–1.3)
GLUCOSE SERPL-MCNC: 90 MG/DL — SIGNIFICANT CHANGE UP (ref 70–99)
HCT VFR BLD CALC: 31.5 % — LOW (ref 34.5–45)
HGB BLD-MCNC: 9.9 G/DL — LOW (ref 11.5–15.5)
MCHC RBC-ENTMCNC: 28.9 PG — SIGNIFICANT CHANGE UP (ref 27–34)
MCHC RBC-ENTMCNC: 31.4 GM/DL — LOW (ref 32–36)
MCV RBC AUTO: 91.8 FL — SIGNIFICANT CHANGE UP (ref 80–100)
NRBC # BLD: 0 /100 WBCS — SIGNIFICANT CHANGE UP (ref 0–0)
PLATELET # BLD AUTO: 98 K/UL — LOW (ref 150–400)
POTASSIUM SERPL-MCNC: 3.9 MMOL/L — SIGNIFICANT CHANGE UP (ref 3.5–5.3)
POTASSIUM SERPL-SCNC: 3.9 MMOL/L — SIGNIFICANT CHANGE UP (ref 3.5–5.3)
RBC # BLD: 3.43 M/UL — LOW (ref 3.8–5.2)
RBC # FLD: 20.5 % — HIGH (ref 10.3–14.5)
SODIUM SERPL-SCNC: 141 MMOL/L — SIGNIFICANT CHANGE UP (ref 135–145)
WBC # BLD: 2.25 K/UL — LOW (ref 3.8–10.5)
WBC # FLD AUTO: 2.25 K/UL — LOW (ref 3.8–10.5)

## 2020-12-05 PROCEDURE — 99232 SBSQ HOSP IP/OBS MODERATE 35: CPT

## 2020-12-05 RX ORDER — SODIUM CHLORIDE 9 MG/ML
250 INJECTION INTRAMUSCULAR; INTRAVENOUS; SUBCUTANEOUS ONCE
Refills: 0 | Status: COMPLETED | OUTPATIENT
Start: 2020-12-05 | End: 2020-12-05

## 2020-12-05 RX ADMIN — SODIUM CHLORIDE 250 MILLILITER(S): 9 INJECTION INTRAMUSCULAR; INTRAVENOUS; SUBCUTANEOUS at 01:18

## 2020-12-05 RX ADMIN — PANTOPRAZOLE SODIUM 40 MILLIGRAM(S): 20 TABLET, DELAYED RELEASE ORAL at 06:31

## 2020-12-05 RX ADMIN — Medication 20 MILLIGRAM(S): at 06:31

## 2020-12-05 NOTE — PROGRESS NOTE ADULT - SUBJECTIVE AND OBJECTIVE BOX
EPS Progress Note    S:  S/p successful slow pathway modification in setting of AVNRT yesterday.  No complaints this am  T(C): 37.3 (12-05-20 @ 09:09), Max: 37.3 (12-05-20 @ 09:09)  HR: 88 (12-05-20 @ 09:20) (67 - 88)  BP: 102/55 (12-05-20 @ 09:20) (88/55 - 119/67)  RR: 18 (12-05-20 @ 09:20) (16 - 18)  SpO2: 98% (12-05-20 @ 09:20) (95% - 100%)  Wt(kg): --     Telemetry:   NSR          General:  No acute distress      Chest:  Chest is clear to auscultation bilaterally without wheezes, crackles, or rhonchi  Cardiac:  Regular rate and rhythm.  No murmur, rubs, or gallops heard.  Abdomen:  Soft without rebound or guarding.  Bowel sounds are presnt in all 4 quadrants.  No hepatosplenomegaly  Extremities:  No lower extremity edema is present.  No cyanosis or clubbing.  Groin sites c/d/i and non-tender      MEDICATIONS  (STANDING):  furosemide    Tablet 20 milliGRAM(s) Oral daily  pantoprazole    Tablet 40 milliGRAM(s) Oral before breakfast    MEDICATIONS  (PRN):                                                         9.9    2.25  )-----------( 98       ( 05 Dec 2020 06:47 )             31.5     12-05    141  |  105  |  8   ----------------------------<  90  3.9   |  26  |  0.56    Ca    9.4      05 Dec 2020 06:47

## 2020-12-05 NOTE — DISCHARGE NOTE NURSING/CASE MANAGEMENT/SOCIAL WORK - PATIENT PORTAL LINK FT
You can access the FollowMyHealth Patient Portal offered by [facility] by registering at the following website: [email or web address]. By joining [employer]’s FollowMyHealth portal, you will also be able to view your health information using other applications (apps) compatible with our system.

## 2020-12-05 NOTE — PROGRESS NOTE ADULT - ASSESSMENT
Ms. Avery is a 60 year-old female with alcoholic cirrhosis and SVT s/p successful AVNRT ablation.    -maintain home med regimen  -ok to d/c home  -F/u with Dr. Calhoun as scheduled

## 2020-12-10 DIAGNOSIS — K70.30 ALCOHOLIC CIRRHOSIS OF LIVER WITHOUT ASCITES: ICD-10-CM

## 2020-12-10 DIAGNOSIS — K29.70 GASTRITIS, UNSPECIFIED, WITHOUT BLEEDING: ICD-10-CM

## 2020-12-10 DIAGNOSIS — F10.10 ALCOHOL ABUSE, UNCOMPLICATED: ICD-10-CM

## 2020-12-10 DIAGNOSIS — I47.1 SUPRAVENTRICULAR TACHYCARDIA: ICD-10-CM

## 2020-12-10 DIAGNOSIS — Z88.1 ALLERGY STATUS TO OTHER ANTIBIOTIC AGENTS STATUS: ICD-10-CM

## 2020-12-30 PROCEDURE — C1732: CPT

## 2020-12-30 PROCEDURE — 80048 BASIC METABOLIC PNL TOTAL CA: CPT

## 2020-12-30 PROCEDURE — 36415 COLL VENOUS BLD VENIPUNCTURE: CPT

## 2020-12-30 PROCEDURE — C1893: CPT

## 2020-12-30 PROCEDURE — 85027 COMPLETE CBC AUTOMATED: CPT

## 2020-12-30 PROCEDURE — C1894: CPT

## 2020-12-30 PROCEDURE — C1760: CPT

## 2020-12-30 PROCEDURE — C1730: CPT

## 2021-01-01 NOTE — H&P ADULT - PROBLEM/PLAN-7
Encourage fluids, bland diet for the next 24 hours; If she does okay with pedialyte may change to formula as tolerated.    Tylenol or motrin as needed for fever  Follow up with pediatrician for worsening symptoms or concerns
DISPLAY PLAN FREE TEXT

## 2021-01-12 ENCOUNTER — APPOINTMENT (OUTPATIENT)
Age: 61
End: 2021-01-12

## 2021-01-15 DIAGNOSIS — Z00.00 ENCOUNTER FOR GENERAL ADULT MEDICAL EXAMINATION W/OUT ABNORMAL FINDINGS: ICD-10-CM

## 2021-01-21 ENCOUNTER — APPOINTMENT (OUTPATIENT)
Dept: HEART AND VASCULAR | Facility: CLINIC | Age: 61
End: 2021-01-21
Payer: MEDICAID

## 2021-01-21 ENCOUNTER — APPOINTMENT (OUTPATIENT)
Age: 61
End: 2021-01-21

## 2021-01-21 ENCOUNTER — OUTPATIENT (OUTPATIENT)
Dept: OUTPATIENT SERVICES | Facility: HOSPITAL | Age: 61
LOS: 1 days | Discharge: HOME | End: 2021-01-21
Payer: COMMERCIAL

## 2021-01-21 ENCOUNTER — NON-APPOINTMENT (OUTPATIENT)
Age: 61
End: 2021-01-21

## 2021-01-21 VITALS
DIASTOLIC BLOOD PRESSURE: 57 MMHG | BODY MASS INDEX: 20.66 KG/M2 | HEIGHT: 64 IN | TEMPERATURE: 97.7 F | HEART RATE: 89 BPM | SYSTOLIC BLOOD PRESSURE: 101 MMHG | WEIGHT: 121 LBS

## 2021-01-21 DIAGNOSIS — I85.00 ESOPHAGEAL VARICES W/OUT BLEEDING: ICD-10-CM

## 2021-01-21 PROCEDURE — 99212 OFFICE O/P EST SF 10 MIN: CPT

## 2021-01-21 PROCEDURE — 99072 ADDL SUPL MATRL&STAF TM PHE: CPT

## 2021-01-21 PROCEDURE — 43235 EGD DIAGNOSTIC BRUSH WASH: CPT

## 2021-01-21 RX ORDER — METOPROLOL TARTRATE 25 MG/1
25 TABLET, FILM COATED ORAL 3 TIMES DAILY
Refills: 0 | Status: DISCONTINUED | COMMUNITY
Start: 2020-12-02 | End: 2021-01-21

## 2021-01-21 RX ORDER — PROPRANOLOL HYDROCHLORIDE 10 MG/1
10 TABLET ORAL TWICE DAILY
Qty: 60 | Refills: 0 | Status: ACTIVE | COMMUNITY
Start: 2021-01-21 | End: 1900-01-01

## 2021-01-21 NOTE — REASON FOR VISIT
[Supraventricular Tachycardia] : supraventricular tachycardia [Follow-Up - Clinic] : a clinic follow-up of

## 2021-01-21 NOTE — HISTORY OF PRESENT ILLNESS
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  She started drinking more heavily and eventually was diagnosed with cirrhosis.  She has esophageal varices that have been banded.  She has been doing well for the past 6+ months from a bleeding perspective. \par \par She states her whole life she has experienced palpitations but thought they were panic attacks.  These episodes have become more frequent and required 2 hospitalization.  She states the first time she was cardioverted and the second time it broke with adenosine.  One of these episodes was associated with a hemoglobin of 7 and she got one unit PRBC with Hgb of 11.  She saw an EP doctor [facility] who recommended an ablation (he had strips and hospital records to review) but she wants to have it done in [place] and he referred her to our office.  She underwent an EP study 12/2020 with ablation of AVNRT.  Since then she notes a significant improvement in the way she feels.  No further palpitations, syncope, near syncope, orthopnea, PND\par  She notes a recent echo with a normal EF and some MR.\par

## 2021-01-21 NOTE — PHYSICAL EXAM
[General Appearance - Well Developed] : well developed [Normal Appearance] : normal appearance [Well Groomed] : well groomed [General Appearance - Well Nourished] : well nourished [No Deformities] : no deformities [General Appearance - In No Acute Distress] : no acute distress [Normal Conjunctiva] : the conjunctiva exhibited no abnormalities [Normal Oral Mucosa] : normal oral mucosa [Normal Jugular Venous V Waves Present] : normal jugular venous V waves present [] : no respiratory distress [Respiration, Rhythm And Depth] : normal respiratory rhythm and effort [Exaggerated Use Of Accessory Muscles For Inspiration] : no accessory muscle use [Abdomen Soft] : soft [Abnormal Walk] : normal gait [Nail Clubbing] : no clubbing of the fingernails [Skin Color & Pigmentation] : normal skin color and pigmentation [Oriented To Time, Place, And Person] : oriented to person, place, and time [Impaired Insight] : insight and judgment were intact [Affect] : the affect was normal [Mood] : the mood was normal [No Anxiety] : not feeling anxious [5th Left ICS - MCL] : palpated at the 5th LICS in the midclavicular line [Normal] : normal [No Precordial Heave] : no precordial heave was noted [Normal Rate] : normal [Rhythm Regular] : regular [Normal S1] : normal S1 [Normal S2] : normal S2 [No Pitting Edema] : no pitting edema present [Apical Thrill] : no thrill palpable at the apex [Click] : no click [Pericardial Rub] : no pericardial rub [Heart Rate And Rhythm] : heart rate and rhythm were normal [Heart Sounds] : normal S1 and S2

## 2021-01-21 NOTE — REVIEW OF SYSTEMS
[see HPI] : see HPI [Fever] : no fever [Chills] : no chills [Feeling Fatigued] : not feeling fatigued [Nausea] : no nausea [Change in Appetite] : no change in appetite [Anxiety] : no anxiety [Negative] : Heme/Lymph

## 2021-01-21 NOTE — DISCUSSION/SUMMARY
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  She is in normal sinus rhythm today and notes a significant improvement in the way she feels.  She is off Metoprolol as she did not like the side effects of this medication.  She has a apple watch and has been monitoring her heart rate and states it is never elevated.  She will follow up with her primary cardiologist and be referred back to our clinic as needed.  She knows to call with any questions or concerns.

## 2021-01-25 ENCOUNTER — APPOINTMENT (OUTPATIENT)
Dept: MRI IMAGING | Facility: HOSPITAL | Age: 61
End: 2021-01-25

## 2021-09-07 ENCOUNTER — APPOINTMENT (OUTPATIENT)
Age: 61
End: 2021-09-07

## 2022-02-22 NOTE — ED ADULT NURSE NOTE - CAS EDN DISCHARGE INTERVENTIONS
[facility]  [address], [place] [address]      Progress Note          Subjective   Patient ID: Lia is a 27 year old female.    Chief Complaint   Patient presents with   • Office Visit   • Rash     side of lips, over 2 weeks   • MEDICATION ISSUE     HPI:     Ms. Bowie presents with complaints of a rash on the corners of her mouth that started about 2 weeks ago.  She has been using Vaseline on it.  She used to take B complex vitamins but stopped several months ago.  She does admit to dryness in the mouth.  Her asthma has been doing well, no recent attacks of wheezing or shortness of breath.  No using Symbicort for maintenance.  She is still on Plaquenil for her rheumatoid arthritis.  No recent arthralgias or joint swelling.  She is still on Topamax and Maxalt for her migraines, which are not occurring that often now.  Patient received her mother in a booster shot on August 21, 2021.  She still has not made an appointment for her Pap smear.      Patient's medications, allergies, past medical, surgical, social and family histories were reviewed and updated as appropriate.    Problem List  Patient Active Problem List   Diagnosis   • Asthma   • Seropositive rheumatoid arthritis (CMS/HCC)   • Positive JORDON (antinuclear antibody)   • Chronic pain of both knees   • Celiac disease   • Endometriosis   • Neurilemmoma   • Retinal vascular abnormality   • Vitamin D deficiency   • Narcolepsy   • History of 2019 novel coronavirus disease (COVID-19)   • Chronic migraine without aura or status migrainosus   • Angular cheilitis       Past Medical History  Past Medical History:   Diagnosis Date   • Rheumatoid arthritis (CMS/HCC)        Past Surgical History  Past Surgical History:   Procedure Laterality Date   • Laproscopic whipple procedure     • [place] tooth extraction         Current Medications  Current Outpatient Medications   Medication Sig Dispense Refill   • budesonide-formoterol (Symbicort)  160-4.5 MCG/ACT inhaler Inhale 2 puffs into the lungs 2 times daily. 10.2 g 12   • albuterol 108 (90 Base) MCG/ACT inhaler Inhale 2 puffs into the lungs every 4 hours as needed.     • Albuterol Sulfate (PROAIR HFA IN) Inhale 2 puffs into the lungs.     • famotidine (PEPCID) 40 MG tablet Take 40 mg by mouth 2 times daily.     • hydroxychloroquine (PLAQUENIL) 200 MG tablet      • omeprazole (PriLOSEC) 40 MG capsule Take 40 mg by mouth daily.     • polyethylene glycol (MIRALAX) 17 GM/SCOOP powder Take 17 g by mouth.     • docusate sodium-sennosides (SENOKOT S) 50-8.6 MG per tablet Take 2 tablets by mouth.     • Cholecalciferol (D3-1000 PO)      • topiramate (TOPAMAX) 100 MG tablet 1 tablet QAM and 2 tablets QPM     • ibuprofen (MOTRIN) 800 MG tablet TAKE 1 TABLET BY MOUTH EVERY 8 HOURS WITH FOOD 60 tablet 3   • levocetirizine (XYZAL) 5 MG tablet TAKE 1 TABLET BY MOUTH DAILY 30 tablet 0   • fluticasone (FLONASE) 50 MCG/ACT nasal spray SHAKE LIQUID AND USE 1 SPRAY IN EACH NOSTRIL EVERY 12 HOURS 16 g 0   • ondansetron (ZOFRAN) 8 MG tablet TAKE 1 TABLET BY MOUTH EVERY 8 HOURS 30 tablet 3   • rizatriptan (MAXALT) 10 MG tablet TAKE 1 TABLET BY MOUTH AT ONSET OF HEADACHE- MAY REPEAT AFTER 2 HOURS IF NEEDED- MAX 3 TABLETS PER 24 HOURS 12 tablet 0   • levalbuterol (XOPENEX HFA) 45 MCG/ACT inhaler INHALE 2 PUFFS EVERY 4 HOURS AS NEEDED     • budesonide-formoterol (SYMBICORT) 160-4.5 MCG/ACT inhaler Inhale 1 puff into the lungs every 12 hours.     • magnesium oxide 500 MG tablet      • acyclovir (ZOVIRAX) 5 % ointment Apply thin film to affected areas of the lips twice a day for 7-10 days 15 g 1   • fexofenadine (ALLEGRA) 180 MG tablet Take 1 tablet by mouth daily. 90 tablet 3   • predniSONE (DELTASONE) 10 MG tablet Take 1 tablet by mouth 2 times daily. 14 tablet 0     No current facility-administered medications for this visit.       Allergies  ALLERGIES:   Allergen Reactions   • Adhesive   (Environmental) Other (See Comments)      Paper tape   • Dog Dander Other (See Comments)   • Gluten Meal   (Food Or Med) DIARRHEA   • Mold   (Environmental) Other (See Comments)   • Pollen Other (See Comments)   • Seasonal Other (See Comments)       Family History  Family History   Problem Relation Age of Onset   • Systemic Lupus Erythematosus Mother    • Asthma Mother    • Diabetes Maternal Grandmother    • Motor Vehicle Accident Father    • Cancer Neg Hx    • Stroke Neg Hx    • Heart disease Neg Hx         Social History  Social History     Tobacco Use   • Smoking status: Never Smoker   • Smokeless tobacco: Never Used   Substance Use Topics   • Alcohol use: No   • Drug use: No           Review of Systems   Constitutional: Negative for chills and fever.   HENT: Positive for mouth sores.    Respiratory: Negative.    Cardiovascular: Negative.    Musculoskeletal: Negative for arthralgias.   Skin: Positive for rash.   Neurological: Negative for dizziness and headaches.   Psychiatric/Behavioral: Negative.        Visit Vitals  /87 (BP Location: LUE - Left upper extremity, Patient Position: Sitting)   Pulse 97   Temp 98.8 °F (37.1 °C) (Temporal)   Ht 5' 9\" (1.753 m)   Wt 66.2 kg (145 lb 15.1 oz)   LMP 02/15/2022 (Exact Date)   BMI 21.55 kg/m²       Objective   Physical Exam  Vitals and nursing note reviewed.   Constitutional:       General: She is not in acute distress.     Appearance: She is normal weight.   HENT:      Head: Normocephalic and atraumatic.      Mouth/Throat:      Comments: Erythematous slightly vesicular rash on the corners of the lips bilaterally  Eyes:      Extraocular Movements: Extraocular movements intact.      Pupils: Pupils are equal, round, and reactive to light.   Neck:      Vascular: No carotid bruit.   Cardiovascular:      Rate and Rhythm: Normal rate and regular rhythm.      Heart sounds: Normal heart sounds. No murmur heard.  Pulmonary:      Effort: No respiratory distress.      Breath sounds: Normal breath sounds. No  wheezing or rales.   Musculoskeletal:      Right lower leg: No edema.      Left lower leg: No edema.   Neurological:      General: No focal deficit present.      Mental Status: She is alert and oriented to person, place, and time.   Psychiatric:         Mood and Affect: Mood normal.         Behavior: Behavior normal.         No visits with results within 2 Week(s) from this visit.   Latest known visit with results is:   Lab Services on 12/20/2021   Component Date Value Ref Range Status   • Quantiferon Plus Interpretation 12/20/2021 Negative  Negative Final   • PNIL 12/20/2021 0.02  IU/mL Final   • P TB Antigen1-NIL 12/20/2021 0.00  IU/mL Final   • P TB Antigen2-NIL 12/20/2021 0.00  IU/mL Final   • P Mitogen-NIL 12/20/2021 9.78  IU/mL Final         Problem List Items Addressed This Visit        ENT    Angular cheilitis - Primary     Start back on B complex vitamins.  Zovirax ointment was prescribed to be applied twice a day.         Relevant Medications    acyclovir (ZOVIRAX) 5 % ointment       Multi-system (Lupus,Sarcoid...)    Seropositive rheumatoid arthritis (CMS/HCC)     Patient's rheumatoid arthritis is currently in remission.  Continue Plaquenil.            Pulmonary and Pneumonias    Asthma     Patient's asthma is in remission.  Continue current regimen.  Asthma action plan was reviewed and updated.         Relevant Medications    fexofenadine (ALLEGRA) 180 MG tablet      RV in 6 months   IV discontinued, cath removed intact

## 2022-08-24 NOTE — ED ADULT TRIAGE NOTE - ESI TRIAGE ACUITY LEVEL, MLM
August 25, 2022    Sakshi Jaeger  [address] [facility] [id number]          Dear ,    We are writing to inform you of your test results.    Your iron levels are fine. You still have mild chronic anemia of unknown cause. This could be related to chronic disease, and does not look to be caused by bleeding. I recommend follow-up for your wellness visit with your new provider.       Resulted Orders   CBC with Platelets and Reflex to Iron Studies   Result Value Ref Range    WBC Count 8.8 4.0 - 11.0 10e3/uL    RBC Count 3.56 (L) 3.80 - 5.20 10e6/uL    Hemoglobin 11.2 (L) 11.7 - 15.7 g/dL    Hematocrit 34.6 (L) 35.0 - 47.0 %    MCV 97 78 - 100 fL    MCH 31.5 26.5 - 33.0 pg    MCHC 32.4 31.5 - 36.5 g/dL    RDW 17.0 (H) 10.0 - 15.0 %    Platelet Count 322 150 - 450 10e3/uL   Extra Green Top (Lithium Heparin) Tube   Result Value Ref Range    Hold Specimen [facility]    Iron & Iron Binding Capacity   Result Value Ref Range    Iron 133 35 - 180 ug/dL    Iron Binding Capacity 265 240 - 430 ug/dL    Iron Sat Index 50 (H) 15 - 46 %   Ferritin   Result Value Ref Range    Ferritin 383 (H) 8 - 252 ng/mL             If you have any questions or concerns, please call the clinic at the number listed above.       Sincerely,      Ruma Bergeron MD           2

## 2022-11-06 NOTE — ED ADULT TRIAGE NOTE - AS O2 DELIVERY
**Incomplete Note**    INTERVAL HPI/OVERNIGHT EVENTS:  Patient was seen and examined at bedside. As per nurse and patient, no o/n events, patient resting comfortably. No complaints at this time. Patient denies: fever, chills, dizziness, weakness, HA, Changes in vision, CP, palpitations, SOB, cough, N/V/D/C, dysuria, changes in bowel movements, LE edema. ROS otherwise negative.    VITAL SIGNS:  T(F): 98.3 (11-06-22 @ 04:53)  HR: 96 (11-06-22 @ 04:15)  BP: 153/80 (11-06-22 @ 04:15)  RR: 18 (11-06-22 @ 04:15)  SpO2: 93% (11-06-22 @ 04:15)  Wt(kg): --      11-05-22 @ 08:01  -  11-06-22 @ 07:00  --------------------------------------------------------  IN: 240 mL / OUT: 250 mL / NET: -10 mL        PHYSICAL EXAM:    Constitutional: WDWN resting comfortably in bed; NAD  HEENT: NC/AT, PERRL, EOMI, anicteric sclera, no nasal discharge; uvula midline, no oropharyngeal erythema or exudates; MMM  Neck: supple; no JVD or thyromegaly  Respiratory: CTA B/L; no W/R/R, no retractions  Cardiac: +S1/S2; RRR; no M/R/G; PMI non-displaced  Gastrointestinal: soft, NT/ND; no rebound or guarding; +BSx4  Genitourinary: normal external genitalia  Back: spine midline, no bony tenderness or step-offs; no CVAT B/L  Extremities: WWP, no clubbing or cyanosis; no peripheral edema  Musculoskeletal: NROM x4; no joint swelling, tenderness or erythema  Vascular: 2+ radial, DP/PT pulses B/L  Dermatologic: skin warm, dry and intact; no rashes, wounds, or scars  Lymphatic: no submandibular or cervical LAD  Neurologic: AAOx3; CNII-XII grossly intact; no focal deficits  Psychiatric: affect and characteristics of appearance, verbalizations, behaviors are appropriate, denies SI/HI/AH/VH    MEDICATIONS  (STANDING):  enoxaparin Injectable 50 milliGRAM(s) SubCutaneous every 12 hours  escitalopram 7.5 milliGRAM(s) Oral daily  levETIRAcetam 500 milliGRAM(s) Oral two times a day  LORazepam     Tablet 0.5 milliGRAM(s) Oral every 8 hours  losartan 100 milliGRAM(s) Oral daily  NIFEdipine XL 30 milliGRAM(s) Oral daily  polyethylene glycol 3350 17 Gram(s) Oral daily    MEDICATIONS  (PRN):  morphine  - Injectable 1 milliGRAM(s) IV Push every 4 hours PRN Moderate Pain (4 - 6)      Allergies    Benadryl (Other)  Compazine (Unknown)    Intolerances        LABS:                        10.9   3.55  )-----------( 57       ( 06 Nov 2022 07:47 )             34.6     11-05    144  |  110<H>  |  19  ----------------------------<  109<H>  4.1   |  27  |  1.49<H>    Ca    8.2<L>      05 Nov 2022 06:09  Phos  2.8     11-05  Mg     1.9     11-05    TPro  5.1<L>  /  Alb  2.5<L>  /  TBili  0.7  /  DBili  0.2  /  AST  39  /  ALT  18  /  AlkPhos  533<H>  11-05    PT/INR - ( 05 Nov 2022 06:09 )   PT: 12.9 sec;   INR: 1.08          PTT - ( 05 Nov 2022 06:09 )  PTT:31.2 sec      RADIOLOGY & ADDITIONAL TESTS:  Reviewed INTERVAL HPI/OVERNIGHT EVENTS:  Patient was seen and examined at bedside. As per nurse and patient, no o/n events, patient resting comfortably. No complaints at this time. Patient denies: fever, chills, dizziness, weakness, HA, Changes in vision, CP, palpitations, SOB, cough, N/V/D/C, dysuria, changes in bowel movements, LE edema. ROS otherwise negative.    VITAL SIGNS:  T(F): 98.3 (11-06-22 @ 04:53)  HR: 96 (11-06-22 @ 04:15)  BP: 153/80 (11-06-22 @ 04:15)  RR: 18 (11-06-22 @ 04:15)  SpO2: 93% (11-06-22 @ 04:15)  Wt(kg): --      11-05-22 @ 08:01  -  11-06-22 @ 07:00  --------------------------------------------------------  IN: 240 mL / OUT: 250 mL / NET: -10 mL        Physical exam:  General: No acute distress, appears groggy  Eyes: Anicteric sclerae, moist conjunctivae, see below for CNs  Neck: trachea midline, FROM, supple, no thyromegaly or lymphadenopathy  Cardiovascular: Regular rate and rhythm, no murmurs, rubs, or gallops. No carotid bruits.   Pulmonary: Anterior breath sounds clear bilaterally, no crackles or wheezing. No use of accessory muscles  GI: Abdomen soft, non-distended, non-tender  Extremities: Radial and DP pulses +2, no edema    Neurologic:  -Mental status: Awake, alert, oriented to person. Speech is fluent with intact naming, repetition, and comprehension, no dysarthria. Recent and remote memory intact. Follows commands. Attention/concentration intact. Fund of knowledge appropriate.  -Cranial nerves:   II: Visual fields are full to confrontation.  III, IV, VI: Extraocular movements are intact without nystagmus. Pupils equally round and reactive to light  V:  Facial sensation V1-V3 equal and intact   VII: Face is symmetric with normal eye closure and smile  VIII: Hearing is bilaterally intact to finger rub  IX, X: Uvula is midline and soft palate rises symmetrically  XI: Head turning and shoulder shrug are intact.  XII: Tongue protrudes midline  Motor: Normal bulk and tone. No pronator drift. Strength bilateral upper extremity 3/5, LE exam limited by effort  Sensation: Intact to light touch bilaterally. No neglect or extinction on double simultaneous testing.  Coordination: No dysmetria on finger-to-nose and heel-to-shin bilaterally  Reflexes: Downgoing toes bilaterally     MEDICATIONS  (STANDING):  enoxaparin Injectable 50 milliGRAM(s) SubCutaneous every 12 hours  escitalopram 7.5 milliGRAM(s) Oral daily  levETIRAcetam 500 milliGRAM(s) Oral two times a day  LORazepam     Tablet 0.5 milliGRAM(s) Oral every 8 hours  losartan 100 milliGRAM(s) Oral daily  NIFEdipine XL 30 milliGRAM(s) Oral daily  polyethylene glycol 3350 17 Gram(s) Oral daily    MEDICATIONS  (PRN):  morphine  - Injectable 1 milliGRAM(s) IV Push every 4 hours PRN Moderate Pain (4 - 6)      Allergies    Benadryl (Other)  Compazine (Unknown)    Intolerances        LABS:                        10.9   3.55  )-----------( 57       ( 06 Nov 2022 07:47 )             34.6     11-05    144  |  110<H>  |  19  ----------------------------<  109<H>  4.1   |  27  |  1.49<H>    Ca    8.2<L>      05 Nov 2022 06:09  Phos  2.8     11-05  Mg     1.9     11-05    TPro  5.1<L>  /  Alb  2.5<L>  /  TBili  0.7  /  DBili  0.2  /  AST  39  /  ALT  18  /  AlkPhos  533<H>  11-05    PT/INR - ( 05 Nov 2022 06:09 )   PT: 12.9 sec;   INR: 1.08          PTT - ( 05 Nov 2022 06:09 )  PTT:31.2 sec      RADIOLOGY & ADDITIONAL TESTS:  Reviewed room air

## 2023-05-07 NOTE — PROGRESS NOTE ADULT - PROBLEM SELECTOR PLAN 1
History of gastritis and alcohol abuse. Presented with 2 days of hematemesis that progressed to melena. Hgb 8.6 on admission which is improved from baseline of 7 but this 2/25 her hemoglobin was 5.7 so given 2 units PRBC.  -CBC >8 today 2/26  -Maintain IV access- currently with 3 large bore IV's  -GI following, s/p EGD showing small hiatal hernia, moderate portal hypertensive gastropathy, and atrophic mucosa in the duodenal bulb  - NPO after MN for pill endoscopy tomorrow, mag citrate at 8 PM tonight and 6 AM tomorrow no fever/no chills

## 2024-03-24 NOTE — PATIENT PROFILE ADULT - NSPROPOAPRESSUREINJURY_GEN_A_NUR
"Vancomycin Dosing by Pharmacy- FOLLOW UP    Fannie Watson is a 75 y.o. year old female who Pharmacy has been consulted for vancomycin dosing for surgical ppx. Based on the patient's indication and renal status this patient is being dosed based on a goal AUC of 400-600.     Renal function is currently stable.    Current vancomycin dose: 1750 mg given every 24 hours    Estimated vancomycin AUC on current dose: 507 mg/L.hr     Visit Vitals  /70   Pulse 93   Temp 36.5 °C (97.7 °F) (Temporal)   Resp 23        Lab Results   Component Value Date    CREATININE 0.53 03/24/2024    CREATININE 0.53 03/23/2024    CREATININE 0.58 03/23/2024    CREATININE 0.56 03/23/2024        Patient weight is No results found for: \"PTWEIGHT\"    No results found for: \"CULTURE\"     I/O last 3 completed shifts:  In: 3000.6 (45.4 mL/kg) [I.V.:950.6 (14.4 mL/kg); Blood:500; IV Piggyback:1550]  Out: 2275 (34.4 mL/kg) [Urine:1925 (0.8 mL/kg/hr); Chest Tube:350]  Weight: 66.1 kg   [unfilled]    Lab Results   Component Value Date    PATIENTTEMP 37.0 03/23/2024    PATIENTTEMP 37.0 03/23/2024    PATIENTTEMP 37.0 03/22/2024        Assessment/Plan    Within goal AUC range. Continue current vancomycin regimen.    This dosing regimen is predicted by [employer] to result in the following pharmacokinetic parameters:  Loading dose: N/A  Regimen: 1750 mg IV every 24 hours.  Start time: 08:42 on 03/24/2024  Exposure target: AUC24 (range)400-600 mg/L.hr   AUC24,ss: 507 mg/L.hr  Probability of AUC24 > 400: 95 %  Ctrough,ss: 12.7 mg/L  Probability of Ctrough,ss > 20: 5 %  Probability of nephrotoxicity (Lodise UNA 2009): 8 %    No additional level will be ordered unless therapy continued.  Will continue to monitor renal function daily while on vancomycin and order serum creatinine at least every 48 hours if not already ordered.  Follow for continued vancomycin needs, clinical response, and signs/symptoms of toxicity.       Rafi Nelson, PharmD           " no

## 2024-09-04 NOTE — PATIENT PROFILE ADULT - VISION (WITH CORRECTIVE LENSES IF THE PATIENT USUALLY WEARS THEM):
Has contacts in now but also uses glassess./Partially impaired: cannot see medication labels or newsprint, but can see obstacles in path, and the surrounding layout; can count fingers at arm's length
Detail Level: Zone
Add High Risk Medication Management Associated Diagnosis?: No

## 2024-10-14 NOTE — ED PROVIDER NOTE - CROS ED CARDIOVAS ALL NEG
10/09/24 0901   Weight: 78.9 kg (174 lb)   Height: 1.651 m (5' 5\")                                              BP Readings from Last 3 Encounters:   10/08/24 119/72   09/09/24 110/66   07/29/23 109/68       NPO Status:                                                                                 BMI:   Wt Readings from Last 3 Encounters:   10/09/24 78.9 kg (174 lb)   10/08/24 78.9 kg (174 lb)   09/09/24 83.6 kg (184 lb 4.8 oz)     Body mass index is 28.96 kg/m².    CBC:   Lab Results   Component Value Date/Time    WBC 8.2 10/08/2024 03:38 PM    RBC 4.55 10/08/2024 03:38 PM    HGB 10.5 10/08/2024 03:38 PM    HCT 35.8 10/08/2024 03:38 PM    MCV 78.7 10/08/2024 03:38 PM    RDW 16.5 09/09/2024 12:27 PM     10/08/2024 03:38 PM       CMP:   Lab Results   Component Value Date/Time     10/08/2024 03:38 PM    K 4.3 10/08/2024 03:38 PM     10/08/2024 03:38 PM    CO2 25 10/08/2024 03:38 PM    BUN 8 10/08/2024 03:38 PM    CREATININE 0.66 10/08/2024 03:38 PM    LABGLOM >90 10/08/2024 03:38 PM    GLUCOSE 95 10/08/2024 03:38 PM    CALCIUM 9.2 10/08/2024 03:38 PM    BILITOT 0.2 10/08/2024 03:38 PM    ALKPHOS 62 10/08/2024 03:38 PM    AST 20 10/08/2024 03:38 PM    ALT 16 10/08/2024 03:38 PM       POC Tests: No results for input(s): \"POCGLU\", \"POCNA\", \"POCK\", \"POCCL\", \"POCBUN\", \"POCHEMO\", \"POCHCT\" in the last 72 hours.    Coags: No results found for: \"PROTIME\", \"INR\", \"APTT\"    HCG (If Applicable):   Lab Results   Component Value Date    PREGTESTUR Negative 09/09/2024        ABGs: No results found for: \"PHART\", \"PO2ART\", \"ESG0ARY\", \"YMG3ZKU\", \"BEART\", \"T7TUKUSB\"     Type & Screen (If Applicable):  Lab Results   Component Value Date    ABORH A POSITIVE 09/09/2024    LABANTI NEG 09/09/2024       Drug/Infectious Status (If Applicable):  No results found for: \"HIV\", \"HEPCAB\"    COVID-19 Screening (If Applicable): No results found for: \"COVID19\"        Anesthesia Evaluation  Patient summary reviewed and Nursing notes 
negative...